# Patient Record
Sex: FEMALE | Race: ASIAN | NOT HISPANIC OR LATINO
[De-identification: names, ages, dates, MRNs, and addresses within clinical notes are randomized per-mention and may not be internally consistent; named-entity substitution may affect disease eponyms.]

---

## 2017-06-29 ENCOUNTER — FORM ENCOUNTER (OUTPATIENT)
Age: 57
End: 2017-06-29

## 2017-06-30 ENCOUNTER — APPOINTMENT (OUTPATIENT)
Age: 57
End: 2017-06-30

## 2017-09-29 ENCOUNTER — APPOINTMENT (OUTPATIENT)
Age: 57
End: 2017-09-29

## 2017-10-19 ENCOUNTER — FORM ENCOUNTER (OUTPATIENT)
Age: 57
End: 2017-10-19

## 2017-10-20 ENCOUNTER — APPOINTMENT (OUTPATIENT)
Age: 57
End: 2017-10-20
Payer: MEDICARE

## 2017-10-20 PROCEDURE — 36908Z: CUSTOM

## 2017-10-20 PROCEDURE — 36902Z: CUSTOM | Mod: 59

## 2017-12-16 ENCOUNTER — EMERGENCY (EMERGENCY)
Facility: HOSPITAL | Age: 57
LOS: 1 days | Discharge: HOME | End: 2017-12-16
Attending: EMERGENCY MEDICINE
Payer: MEDICARE

## 2017-12-16 VITALS
TEMPERATURE: 98 F | DIASTOLIC BLOOD PRESSURE: 71 MMHG | OXYGEN SATURATION: 97 % | HEART RATE: 76 BPM | HEIGHT: 63 IN | RESPIRATION RATE: 18 BRPM | SYSTOLIC BLOOD PRESSURE: 127 MMHG | WEIGHT: 94.36 LBS

## 2017-12-16 VITALS
SYSTOLIC BLOOD PRESSURE: 154 MMHG | OXYGEN SATURATION: 100 % | TEMPERATURE: 98 F | RESPIRATION RATE: 18 BRPM | DIASTOLIC BLOOD PRESSURE: 70 MMHG | HEART RATE: 71 BPM

## 2017-12-16 LAB
ALBUMIN SERPL ELPH-MCNC: 3.4 G/DL — LOW (ref 3.5–5)
ALBUMIN SERPL ELPH-MCNC: 3.5 G/DL — SIGNIFICANT CHANGE UP (ref 3.5–5)
ALP SERPL-CCNC: 185 U/L — HIGH (ref 40–120)
ALP SERPL-CCNC: 201 U/L — HIGH (ref 40–120)
ALT FLD-CCNC: 12 U/L DA — SIGNIFICANT CHANGE UP (ref 10–60)
ALT FLD-CCNC: 16 U/L DA — SIGNIFICANT CHANGE UP (ref 10–60)
ANION GAP SERPL CALC-SCNC: 12 MMOL/L — SIGNIFICANT CHANGE UP (ref 5–17)
ANION GAP SERPL CALC-SCNC: 2 MMOL/L — LOW (ref 5–17)
AST SERPL-CCNC: 12 U/L — SIGNIFICANT CHANGE UP (ref 10–40)
AST SERPL-CCNC: 62 U/L — HIGH (ref 10–40)
BASOPHILS # BLD AUTO: 0.1 K/UL — SIGNIFICANT CHANGE UP (ref 0–0.2)
BASOPHILS NFR BLD AUTO: 1.8 % — SIGNIFICANT CHANGE UP (ref 0–2)
BILIRUB SERPL-MCNC: 0.3 MG/DL — SIGNIFICANT CHANGE UP (ref 0.2–1.2)
BILIRUB SERPL-MCNC: 0.5 MG/DL — SIGNIFICANT CHANGE UP (ref 0.2–1.2)
BUN SERPL-MCNC: 24 MG/DL — HIGH (ref 7–18)
BUN SERPL-MCNC: 28 MG/DL — HIGH (ref 7–18)
CALCIUM SERPL-MCNC: 7.6 MG/DL — LOW (ref 8.4–10.5)
CALCIUM SERPL-MCNC: 7.7 MG/DL — LOW (ref 8.4–10.5)
CHLORIDE SERPL-SCNC: 93 MMOL/L — LOW (ref 96–108)
CHLORIDE SERPL-SCNC: 93 MMOL/L — LOW (ref 96–108)
CO2 SERPL-SCNC: 30 MMOL/L — SIGNIFICANT CHANGE UP (ref 22–31)
CO2 SERPL-SCNC: 33 MMOL/L — HIGH (ref 22–31)
CREAT SERPL-MCNC: 4.64 MG/DL — HIGH (ref 0.5–1.3)
CREAT SERPL-MCNC: 5.71 MG/DL — HIGH (ref 0.5–1.3)
EOSINOPHIL # BLD AUTO: 0.1 K/UL — SIGNIFICANT CHANGE UP (ref 0–0.5)
EOSINOPHIL NFR BLD AUTO: 2.7 % — SIGNIFICANT CHANGE UP (ref 0–6)
GLUCOSE SERPL-MCNC: 172 MG/DL — HIGH (ref 70–99)
GLUCOSE SERPL-MCNC: 91 MG/DL — SIGNIFICANT CHANGE UP (ref 70–99)
HCT VFR BLD CALC: 41.7 % — SIGNIFICANT CHANGE UP (ref 34.5–45)
HGB BLD-MCNC: 13.3 G/DL — SIGNIFICANT CHANGE UP (ref 11.5–15.5)
LYMPHOCYTES # BLD AUTO: 1.3 K/UL — SIGNIFICANT CHANGE UP (ref 1–3.3)
LYMPHOCYTES # BLD AUTO: 38.4 % — SIGNIFICANT CHANGE UP (ref 13–44)
MCHC RBC-ENTMCNC: 31.9 GM/DL — LOW (ref 32–36)
MCHC RBC-ENTMCNC: 32 PG — SIGNIFICANT CHANGE UP (ref 27–34)
MCV RBC AUTO: 100.4 FL — HIGH (ref 80–100)
MONOCYTES # BLD AUTO: 0.4 K/UL — SIGNIFICANT CHANGE UP (ref 0–0.9)
MONOCYTES NFR BLD AUTO: 10.6 % — SIGNIFICANT CHANGE UP (ref 2–14)
NEUTROPHILS # BLD AUTO: 1.6 K/UL — LOW (ref 1.8–7.4)
NEUTROPHILS NFR BLD AUTO: 46.5 % — SIGNIFICANT CHANGE UP (ref 43–77)
PLATELET # BLD AUTO: 160 K/UL — SIGNIFICANT CHANGE UP (ref 150–400)
POTASSIUM SERPL-MCNC: 3.3 MMOL/L — LOW (ref 3.5–5.3)
POTASSIUM SERPL-MCNC: 6.2 MMOL/L — CRITICAL HIGH (ref 3.5–5.3)
POTASSIUM SERPL-SCNC: 3.3 MMOL/L — LOW (ref 3.5–5.3)
POTASSIUM SERPL-SCNC: 6.2 MMOL/L — CRITICAL HIGH (ref 3.5–5.3)
PROT SERPL-MCNC: 7.1 G/DL — SIGNIFICANT CHANGE UP (ref 6–8.3)
PROT SERPL-MCNC: 7.9 G/DL — SIGNIFICANT CHANGE UP (ref 6–8.3)
RBC # BLD: 4.16 M/UL — SIGNIFICANT CHANGE UP (ref 3.8–5.2)
RBC # FLD: 15.1 % — HIGH (ref 10.3–14.5)
SODIUM SERPL-SCNC: 128 MMOL/L — LOW (ref 135–145)
SODIUM SERPL-SCNC: 135 MMOL/L — SIGNIFICANT CHANGE UP (ref 135–145)
WBC # BLD: 3.4 K/UL — LOW (ref 3.8–10.5)
WBC # FLD AUTO: 3.4 K/UL — LOW (ref 3.8–10.5)

## 2017-12-16 PROCEDURE — 74176 CT ABD & PELVIS W/O CONTRAST: CPT

## 2017-12-16 PROCEDURE — 85027 COMPLETE CBC AUTOMATED: CPT

## 2017-12-16 PROCEDURE — 71010: CPT | Mod: 26

## 2017-12-16 PROCEDURE — 99284 EMERGENCY DEPT VISIT MOD MDM: CPT | Mod: 25

## 2017-12-16 PROCEDURE — 99284 EMERGENCY DEPT VISIT MOD MDM: CPT

## 2017-12-16 PROCEDURE — 80053 COMPREHEN METABOLIC PANEL: CPT

## 2017-12-16 PROCEDURE — 74176 CT ABD & PELVIS W/O CONTRAST: CPT | Mod: 26

## 2017-12-16 PROCEDURE — 93005 ELECTROCARDIOGRAM TRACING: CPT

## 2017-12-16 PROCEDURE — 71045 X-RAY EXAM CHEST 1 VIEW: CPT

## 2017-12-16 NOTE — ED PROVIDER NOTE - OBJECTIVE STATEMENT
58 y/o F pt with PMHx of CKD (on dialysis; last session today) presents to ED c/o palpitations and chest pain x today; pt also reports cough, diarrhea, and abd pain x ~ 1 week. Pt reports onset of chest pain and palpitations after dialysis session. Pt was hospitalized for chest pain at [facility]; pt denies h/o stress test. Pt denies fever, chills, nausea, vomiting, or any other complaints. ALLERGIES: scopolamine, adhesives, and MAXALONE.

## 2017-12-16 NOTE — ED ADULT NURSE REASSESSMENT NOTE - NS ED NURSE REASSESS COMMENT FT1
received  pt awake alert oriented x 3 not in distress with saline lock intact,with daughter at bedside,waiting for dispo.

## 2017-12-16 NOTE — ED PROVIDER NOTE - CARE PLAN
Principal Discharge DX:	Palpitations  Secondary Diagnosis:	Constipation, unspecified constipation type

## 2017-12-16 NOTE — ED ADULT NURSE NOTE - OBJECTIVE STATEMENT
Patient presents to ED c/o palpitations and chest pain for today; pt also reports cough, diarrhea, and abd pain x ~ 1 week. Pt reports onset of chest pain and palpitations after dialysis session. Pt was hospitalized for chest pain at [facility]; pt denies h/o stress test. Pt denies fever, chills, nausea, vomiting, or any other complaints.

## 2017-12-16 NOTE — ED ADULT NURSE NOTE - CHPI ED SYMPTOMS NEG
no back pain/no dizziness/no chills/no syncope/no chest pain/no shortness of breath/no fever/no diaphoresis

## 2018-01-19 ENCOUNTER — APPOINTMENT (OUTPATIENT)
Age: 58
End: 2018-01-19

## 2018-09-13 ENCOUNTER — APPOINTMENT (OUTPATIENT)
Dept: ENDOVASCULAR SURGERY | Facility: CLINIC | Age: 58
End: 2018-09-13
Payer: MEDICARE

## 2018-09-13 PROCEDURE — 36901 INTRO CATH DIALYSIS CIRCUIT: CPT

## 2018-09-13 PROCEDURE — 36907Z: CUSTOM

## 2018-12-13 ENCOUNTER — APPOINTMENT (OUTPATIENT)
Dept: ENDOVASCULAR SURGERY | Facility: CLINIC | Age: 58
End: 2018-12-13
Payer: MEDICARE

## 2018-12-13 PROCEDURE — 36902Z: CUSTOM

## 2018-12-13 PROCEDURE — 36907Z: CUSTOM

## 2019-03-21 ENCOUNTER — APPOINTMENT (OUTPATIENT)
Dept: VASCULAR SURGERY | Facility: CLINIC | Age: 59
End: 2019-03-21
Payer: MEDICARE

## 2019-03-21 PROCEDURE — XXXXX: CPT

## 2019-03-21 PROCEDURE — 93990 DOPPLER FLOW TESTING: CPT

## 2019-08-21 PROBLEM — F32.9 DEPRESSION: Status: ACTIVE | Noted: 2019-08-21

## 2019-08-21 PROBLEM — Z85.79 HISTORY OF LYMPHOMA: Status: RESOLVED | Noted: 2019-08-21 | Resolved: 2019-08-21

## 2019-08-21 PROBLEM — T82.898A INADEQUATE FLOW OF DIALYSIS ARTERIOVENOUS FISTULA: Status: ACTIVE | Noted: 2019-08-21

## 2019-08-21 PROBLEM — N18.6 CHRONIC KIDNEY DISEASE REQUIRING CHRONIC DIALYSIS: Status: ACTIVE | Noted: 2017-10-23

## 2019-08-21 PROBLEM — K21.9 GERD (GASTROESOPHAGEAL REFLUX DISEASE): Status: ACTIVE | Noted: 2019-08-21

## 2019-08-21 PROBLEM — Q61.3 PKD (POLYCYSTIC KIDNEY DISEASE): Status: ACTIVE | Noted: 2019-08-21

## 2019-08-22 ENCOUNTER — APPOINTMENT (OUTPATIENT)
Dept: ENDOVASCULAR SURGERY | Facility: CLINIC | Age: 59
End: 2019-08-22
Payer: MEDICARE

## 2019-08-22 VITALS
BODY MASS INDEX: 18.73 KG/M2 | TEMPERATURE: 98 F | SYSTOLIC BLOOD PRESSURE: 210 MMHG | RESPIRATION RATE: 16 BRPM | OXYGEN SATURATION: 100 % | HEIGHT: 61 IN | WEIGHT: 99.21 LBS | DIASTOLIC BLOOD PRESSURE: 68 MMHG | HEART RATE: 56 BPM

## 2019-08-22 DIAGNOSIS — Z99.2 END STAGE RENAL DISEASE: ICD-10-CM

## 2019-08-22 DIAGNOSIS — K21.9 GASTRO-ESOPHAGEAL REFLUX DISEASE W/OUT ESOPHAGITIS: ICD-10-CM

## 2019-08-22 DIAGNOSIS — T82.898A OTHER SPECIFIED COMPLICATION OF VASCULAR PROSTHETIC DEVICES, IMPLANTS AND GRAFTS, INITIAL ENCOUNTER: ICD-10-CM

## 2019-08-22 DIAGNOSIS — Z85.79 PERSONAL HISTORY OF OTHER MALIGNANT NEOPLASMS OF LYMPHOID, HEMATOPOIETIC AND RELATED TISSUES: ICD-10-CM

## 2019-08-22 DIAGNOSIS — F32.9 MAJOR DEPRESSIVE DISORDER, SINGLE EPISODE, UNSPECIFIED: ICD-10-CM

## 2019-08-22 DIAGNOSIS — N18.6 END STAGE RENAL DISEASE: ICD-10-CM

## 2019-08-22 DIAGNOSIS — Q61.3 POLYCYSTIC KIDNEY, UNSPECIFIED: ICD-10-CM

## 2019-08-22 PROCEDURE — 36901 INTRO CATH DIALYSIS CIRCUIT: CPT

## 2019-08-22 PROCEDURE — 36907Z: CUSTOM

## 2019-08-22 NOTE — PAST MEDICAL HISTORY
[Increasing age ( >40 years old)] : Increasing age ( >40 years old) [No therapy indicated for cases scheduled for less than one hour] : No therapy indicated for cases scheduled for less than one hour. [FreeTextEntry1] : Malignant Hyperthermia Screening Tool and Risk of Bleeding Assessment\par \par Ms. JL CARDOSO denies family history of unexpected death following Anesthesia or Exercise.\par Denies Family history of Malignant Hyperthermia, Muscle or Neuromuscular disorder and High Temperature following exercise.\par \par Ms. JL CARDOSO denies history of Muscle Spasm, Dark or Chocolate - Colored urine and Unanticipated fever immediately following anesthesia or serious exercise. \par Ms. CARDOSO also denies bleeding tendencies/ Risks of Bleeding.\par

## 2019-08-22 NOTE — ASSESSMENT
[FreeTextEntry1] : PT with prolonged bleeding of RUE access for eval and possible PTA.  Consent obtained.\par \par Fistulogram demonstrates mod to severe stenosis in right SCLV stent pta to 14 mm with good result.  EBL=minimal.  FUll report to follow.

## 2019-08-22 NOTE — HISTORY OF PRESENT ILLNESS
[] : right brachiocephalic fistula [FreeTextEntry1] : 2013 [facility] [FreeTextEntry5] : 8/21/2019 8pm [FreeTextEntry6] : Dr. Law

## 2019-08-22 NOTE — PROCEDURE
[Resume diet] : resume diet [Site check for bleeding/hematoma/thrill/bruit] : Site check for bleeding/hematoma/thrill/bruit [Vital signs on admission the q 15 mins x2] : Vital signs on admission the q 15 mins x2 [FreeTextEntry1] : right arm fistula angioplasty

## 2019-11-19 ENCOUNTER — APPOINTMENT (OUTPATIENT)
Dept: VASCULAR SURGERY | Facility: CLINIC | Age: 59
End: 2019-11-19

## 2019-11-19 ENCOUNTER — OTHER (OUTPATIENT)
Age: 59
End: 2019-11-19

## 2020-10-30 ENCOUNTER — EMERGENCY (EMERGENCY)
Facility: HOSPITAL | Age: 60
LOS: 1 days | Discharge: HOME | End: 2020-10-30
Attending: STUDENT IN AN ORGANIZED HEALTH CARE EDUCATION/TRAINING PROGRAM | Admitting: EMERGENCY MEDICINE
Payer: MEDICARE

## 2020-10-30 VITALS
OXYGEN SATURATION: 100 % | HEART RATE: 62 BPM | SYSTOLIC BLOOD PRESSURE: 146 MMHG | RESPIRATION RATE: 18 BRPM | DIASTOLIC BLOOD PRESSURE: 56 MMHG | TEMPERATURE: 98 F | HEIGHT: 63 IN

## 2020-10-30 LAB
ALBUMIN SERPL ELPH-MCNC: 4.2 G/DL — SIGNIFICANT CHANGE UP (ref 3.3–5)
ALP SERPL-CCNC: 111 U/L — SIGNIFICANT CHANGE UP (ref 40–120)
ALT FLD-CCNC: < 4 U/L — LOW (ref 4–33)
ANION GAP SERPL CALC-SCNC: 14 MMO/L — SIGNIFICANT CHANGE UP (ref 7–14)
APAP SERPL-MCNC: < 15 UG/ML — LOW (ref 15–25)
AST SERPL-CCNC: 19 U/L — SIGNIFICANT CHANGE UP (ref 4–32)
BASE EXCESS BLDV CALC-SCNC: 1.7 MMOL/L — SIGNIFICANT CHANGE UP
BASOPHILS # BLD AUTO: 0.01 K/UL — SIGNIFICANT CHANGE UP (ref 0–0.2)
BASOPHILS NFR BLD AUTO: 0.2 % — SIGNIFICANT CHANGE UP (ref 0–2)
BILIRUB SERPL-MCNC: 0.3 MG/DL — SIGNIFICANT CHANGE UP (ref 0.2–1.2)
BLOOD GAS VENOUS - CREATININE: 6.94 MG/DL — HIGH (ref 0.5–1.3)
BLOOD GAS VENOUS - FIO2: 21 — SIGNIFICANT CHANGE UP
BUN SERPL-MCNC: 60 MG/DL — HIGH (ref 7–23)
CALCIUM SERPL-MCNC: 7.5 MG/DL — LOW (ref 8.4–10.5)
CHLORIDE BLDV-SCNC: 99 MMOL/L — SIGNIFICANT CHANGE UP (ref 96–108)
CHLORIDE SERPL-SCNC: 98 MMOL/L — SIGNIFICANT CHANGE UP (ref 98–107)
CO2 SERPL-SCNC: 26 MMOL/L — SIGNIFICANT CHANGE UP (ref 22–31)
CREAT SERPL-MCNC: 6.58 MG/DL — HIGH (ref 0.5–1.3)
EOSINOPHIL # BLD AUTO: 0.14 K/UL — SIGNIFICANT CHANGE UP (ref 0–0.5)
EOSINOPHIL NFR BLD AUTO: 2.9 % — SIGNIFICANT CHANGE UP (ref 0–6)
ETHANOL BLD-MCNC: < 10 MG/DL — SIGNIFICANT CHANGE UP
GAS PNL BLDV: 137 MMOL/L — SIGNIFICANT CHANGE UP (ref 136–146)
GLUCOSE BLDV-MCNC: 97 MG/DL — SIGNIFICANT CHANGE UP (ref 70–99)
GLUCOSE SERPL-MCNC: 99 MG/DL — SIGNIFICANT CHANGE UP (ref 70–99)
HCO3 BLDV-SCNC: 24 MMOL/L — SIGNIFICANT CHANGE UP (ref 20–27)
HCT VFR BLD CALC: 33.7 % — LOW (ref 34.5–45)
HCT VFR BLDV CALC: 33.6 % — LOW (ref 34.5–45)
HGB BLD-MCNC: 10.2 G/DL — LOW (ref 11.5–15.5)
HGB BLDV-MCNC: 10.9 G/DL — LOW (ref 11.5–15.5)
IMM GRANULOCYTES NFR BLD AUTO: 0.2 % — SIGNIFICANT CHANGE UP (ref 0–1.5)
LACTATE BLDV-MCNC: 0.7 MMOL/L — SIGNIFICANT CHANGE UP (ref 0.5–2)
LYMPHOCYTES # BLD AUTO: 0.98 K/UL — LOW (ref 1–3.3)
LYMPHOCYTES # BLD AUTO: 20.1 % — SIGNIFICANT CHANGE UP (ref 13–44)
MCHC RBC-ENTMCNC: 28.2 PG — SIGNIFICANT CHANGE UP (ref 27–34)
MCHC RBC-ENTMCNC: 30.3 % — LOW (ref 32–36)
MCV RBC AUTO: 93.1 FL — SIGNIFICANT CHANGE UP (ref 80–100)
MONOCYTES # BLD AUTO: 0.33 K/UL — SIGNIFICANT CHANGE UP (ref 0–0.9)
MONOCYTES NFR BLD AUTO: 6.8 % — SIGNIFICANT CHANGE UP (ref 2–14)
NEUTROPHILS # BLD AUTO: 3.4 K/UL — SIGNIFICANT CHANGE UP (ref 1.8–7.4)
NEUTROPHILS NFR BLD AUTO: 69.8 % — SIGNIFICANT CHANGE UP (ref 43–77)
NRBC # FLD: 0 K/UL — SIGNIFICANT CHANGE UP (ref 0–0)
PCO2 BLDV: 53 MMHG — HIGH (ref 41–51)
PH BLDV: 7.33 PH — SIGNIFICANT CHANGE UP (ref 7.32–7.43)
PLATELET # BLD AUTO: 174 K/UL — SIGNIFICANT CHANGE UP (ref 150–400)
PMV BLD: 10.2 FL — SIGNIFICANT CHANGE UP (ref 7–13)
PO2 BLDV: 25 MMHG — LOW (ref 35–40)
POTASSIUM BLDV-SCNC: 5 MMOL/L — HIGH (ref 3.4–4.5)
POTASSIUM SERPL-MCNC: 5.1 MMOL/L — SIGNIFICANT CHANGE UP (ref 3.5–5.3)
POTASSIUM SERPL-SCNC: 5.1 MMOL/L — SIGNIFICANT CHANGE UP (ref 3.5–5.3)
PROT SERPL-MCNC: 6.6 G/DL — SIGNIFICANT CHANGE UP (ref 6–8.3)
RBC # BLD: 3.62 M/UL — LOW (ref 3.8–5.2)
RBC # FLD: 16.6 % — HIGH (ref 10.3–14.5)
SALICYLATES SERPL-MCNC: < 5 MG/DL — LOW (ref 15–30)
SAO2 % BLDV: 29.2 % — LOW (ref 60–85)
SODIUM SERPL-SCNC: 138 MMOL/L — SIGNIFICANT CHANGE UP (ref 135–145)
WBC # BLD: 4.87 K/UL — SIGNIFICANT CHANGE UP (ref 3.8–10.5)
WBC # FLD AUTO: 4.87 K/UL — SIGNIFICANT CHANGE UP (ref 3.8–10.5)

## 2020-10-30 PROCEDURE — 99285 EMERGENCY DEPT VISIT HI MDM: CPT | Mod: 25

## 2020-10-30 PROCEDURE — 93010 ELECTROCARDIOGRAM REPORT: CPT

## 2020-10-30 NOTE — ED PROVIDER NOTE - CLINICAL SUMMARY MEDICAL DECISION MAKING FREE TEXT BOX
59 Y/O F PMH ESRD on Plavix, CKD (chronic kidney disease), Dyslipidemia, ESRD (end stage renal disease)  dialysis M, W, F, HTN (hypertension) normally takes Valium during dialysis took Valium as well as Benadryl today. Pt has become more arrousable during her stay and is now A+O X 3. Spoke to dialysis center who will be able to schedule her for session. Labs do not show abnormal potassium or other abnormalities requiring emergent intervention.

## 2020-10-30 NOTE — ED ADULT NURSE NOTE - CHIEF COMPLAINT QUOTE
Pt arrives from diaylsis. As per EMT" Staff reports after receiving diazapam & benadryl prior to dialysis treatment 15 minutes into treatment noted she is very lethargic. Pt opens eyes briefly ." Pt is on plavix Keanu called to triage. For eval. Noted reddness and warm rt upper arm diaylsis graft site.

## 2020-10-30 NOTE — ED PROVIDER NOTE - NSFOLLOWUPINSTRUCTIONS_ED_ALL_ED_FT
Your dialysis needs to be completed, you can come today at 630PM or tomorrow at 630AM. Make sure you do not miss the session. Advance activity as tolerated.  Continue all previously prescribed medications as directed.  Follow up with your primary care physician in 48-72 hours- bring copies of your results.  Return to the ER for worsening or persistent symptoms, and/or ANY NEW OR CONCERNING SYMPTOMS. If you have issues obtaining follow up, please call: [phone] to obtain a doctor or specialist who takes your insurance in your area.  You may call [phone] to make an appointment with the internal medicine clinic.

## 2020-10-30 NOTE — ED PROVIDER NOTE - PMH
CKD (chronic kidney disease)    Dyslipidemia    ESRD (end stage renal disease)  dialysis M, W, F  HTN (hypertension)

## 2020-10-30 NOTE — ED PROVIDER NOTE - RAPID ASSESSMENT
Jay KUMAR MD PGY3: Called to triage for stroke eval. Patient ESRD on Eliquis p/w AMS at dialysis and apparently receives anxiolytics including diazepam prior to initiating dialysis. Today was noted to become somnolent 15 minutes into dialysis session and was BIBEMS. No trauma to head. No falls. Uncertain of patietn baseline. VSS. Patient somnolent but arousable to pain and loud voice. Moves all extremities in response to pain, EOM intact, pupils intact, CN7 and CN8 difficult to assess, however remainder cranial nerves aside from CN 1 intact.  Stroke code not called at this time due to neuro functions grossly intact barring somnolence, and reasonable alternatives (sedation from medication, possible infection) present.

## 2020-10-30 NOTE — ED PROVIDER NOTE - PSH
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

## 2020-10-30 NOTE — ED ADULT NURSE NOTE - NSIMPLEMENTINTERV_GEN_ALL_ED
Implemented All Fall Risk Interventions:  [place] to call system. Call bell, personal items and telephone within reach. Instruct patient to call for assistance. Room bathroom lighting operational. Non-slip footwear when patient is off stretcher. Physically safe environment: no spills, clutter or unnecessary equipment. Stretcher in lowest position, wheels locked, appropriate side rails in place. Provide visual cue, wrist band, yellow gown, etc. Monitor gait and stability. Monitor for mental status changes and reorient to person, place, and time. Review medications for side effects contributing to fall risk. Reinforce activity limits and safety measures with patient and family.

## 2020-10-30 NOTE — ED ADULT TRIAGE NOTE - CHIEF COMPLAINT QUOTE
Pt arrives from diaylsis. As per EMT" Staff reports after receiving diazapam & benadryl prior to dialysis treatment 15 minutes into treatment noted she is very lethargic. Pt opens eyes briefly ." Pt is on plavix Pt arrives from diaylsis. As per EMT" Staff reports after receiving diazapam & benadryl prior to dialysis treatment 15 minutes into treatment noted she is very lethargic. Pt opens eyes briefly ." Pt is on plavix Keanu called to triage. For eval. Noted reddness and warm rt upper arm diaylsis graft site.

## 2020-10-30 NOTE — ED PROVIDER NOTE - PATIENT PORTAL LINK FT
You can access the FollowMyHealth Patient Portal offered by [facility] by registering at the following website: [email or web address]. By joining [employer]’s FollowMyHealth portal, you will also be able to view your health information using other applications (apps) compatible with our system.

## 2020-10-30 NOTE — ED PROVIDER NOTE - PROGRESS NOTE DETAILS
ERINN Gaytan: Pt is now A+O, concerned about the presence of her phone. Spoke to Odessa, the charge nurse from [facility] [place]. States that pt was confused after tasking her medication at dialysis and was more lethargic so 911 was called. States that pt would be able to go for dialysis today at 630PM or tomorrow at 630AM. Advised pt's  of plan as well. Will D/C. ERINN Gaytan: Spoke to pt's  Luis, states that he will pick pt up from ED shortly, advised of plan of D/C and that pt must coordinate with the [facility] [place] to reschedule dialysis, verbalized understanding.

## 2020-10-30 NOTE — ED ADULT NURSE NOTE - OBJECTIVE STATEMENT
Received pt to spot 25 A&Ox4 on assessment is unable to report what happened at dialysis this morning. Pt denies complaints at this time, notably extremely anxious on assessment, repeating the same thing numerous times, unable to be redirected. IV placed, labs sent, VS as documented, will continue to monitor.

## 2020-10-30 NOTE — ED PROVIDER NOTE - ATTENDING CONTRIBUTION TO CARE
Lemuel RECIO: I agree with the above provided history and exam and addend/modify it as follows.    60F w/ pmh Plavix, CKD (chronic kidney disease), HLD, ESRD on dialysis MWF, HTN -- sent from dialysis - patient was lethargic, AMS, patient does usually take valium and bendaryl prior to her dialysis sessions and did take it today as well. Patient upon arrival to ED was sleepy, however after further discussion and stimulation patient is now awake, obeying commands, denies any pain, headache, cough, sob, etc. Neuro exam grossly nonfocal, normal. AMS likely 2/2 medication, possible inadvertant overdose? but patient back to baseline now in room, will check labs, discuss w/ her dialysis center to resume dialysis today, likely dc w/ close outpt f/u    I Isaak Hdez MD performed a history and physical exam of the patient and discussed their management with the resident and /or advanced care provider. I reviewed the resident and /or ACP's note and agree with the documented findings and plan of care. My medical decision making and observations are found above.

## 2020-10-30 NOTE — ED PROVIDER NOTE - OBJECTIVE STATEMENT
59 Y/O F PMH ESRD on Plavix 59 Y/O F PMH ESRD on Plavix, CKD (chronic kidney disease), Dyslipidemia, ESRD (end stage renal disease)  dialysis M, W, F, HTN (hypertension) normally takes Valium and Benadryl 61 Y/O F PMH ESRD on Plavix, CKD (chronic kidney disease), Dyslipidemia, ESRD (end stage renal disease)  dialysis M, W, F, HTN (hypertension) normally takes Valium during dialysis took Valium as well as Benadryl today. Pt also states she takes Oxycodone for pain. Spoke to Odessa from center, states pt became lethargic while at session so EMS was called. Pt arrived to ED somolent but continued to become more arousable during her stay. Pt denies falling and denies any acute symptoms or complaints.

## 2023-03-31 ENCOUNTER — INPATIENT (INPATIENT)
Facility: HOSPITAL | Age: 63
LOS: 0 days | Discharge: HOME | End: 2023-04-01
Attending: STUDENT IN AN ORGANIZED HEALTH CARE EDUCATION/TRAINING PROGRAM | Admitting: STUDENT IN AN ORGANIZED HEALTH CARE EDUCATION/TRAINING PROGRAM
Payer: MEDICARE

## 2023-03-31 VITALS
HEART RATE: 69 BPM | RESPIRATION RATE: 18 BRPM | DIASTOLIC BLOOD PRESSURE: 52 MMHG | OXYGEN SATURATION: 100 % | TEMPERATURE: 98 F | SYSTOLIC BLOOD PRESSURE: 161 MMHG

## 2023-03-31 DIAGNOSIS — E78.00 PURE HYPERCHOLESTEROLEMIA, UNSPECIFIED: ICD-10-CM

## 2023-03-31 DIAGNOSIS — N18.6 END STAGE RENAL DISEASE: ICD-10-CM

## 2023-03-31 DIAGNOSIS — D64.9 ANEMIA, UNSPECIFIED: ICD-10-CM

## 2023-03-31 DIAGNOSIS — Z79.899 OTHER LONG TERM (CURRENT) DRUG THERAPY: ICD-10-CM

## 2023-03-31 DIAGNOSIS — I63.9 CEREBRAL INFARCTION, UNSPECIFIED: ICD-10-CM

## 2023-03-31 DIAGNOSIS — I10 ESSENTIAL (PRIMARY) HYPERTENSION: ICD-10-CM

## 2023-03-31 LAB
ALBUMIN SERPL ELPH-MCNC: 4.1 G/DL — SIGNIFICANT CHANGE UP (ref 3.3–5)
ALP SERPL-CCNC: 126 U/L — HIGH (ref 40–120)
ALT FLD-CCNC: 16 U/L — SIGNIFICANT CHANGE UP (ref 4–33)
ANION GAP SERPL CALC-SCNC: 16 MMOL/L — HIGH (ref 7–14)
ANION GAP SERPL CALC-SCNC: 17 MMOL/L — HIGH (ref 7–14)
AST SERPL-CCNC: 38 U/L — HIGH (ref 4–32)
BASE EXCESS BLDV CALC-SCNC: 2.9 MMOL/L — SIGNIFICANT CHANGE UP (ref -2–3)
BASOPHILS # BLD AUTO: 0.02 K/UL — SIGNIFICANT CHANGE UP (ref 0–0.2)
BASOPHILS NFR BLD AUTO: 0.4 % — SIGNIFICANT CHANGE UP (ref 0–2)
BILIRUB SERPL-MCNC: 0.4 MG/DL — SIGNIFICANT CHANGE UP (ref 0.2–1.2)
BLD GP AB SCN SERPL QL: NEGATIVE — SIGNIFICANT CHANGE UP
BLOOD GAS VENOUS COMPREHENSIVE RESULT: SIGNIFICANT CHANGE UP
BUN SERPL-MCNC: 54 MG/DL — HIGH (ref 7–23)
BUN SERPL-MCNC: 56 MG/DL — HIGH (ref 7–23)
CALCIUM SERPL-MCNC: 7.8 MG/DL — LOW (ref 8.4–10.5)
CALCIUM SERPL-MCNC: 8 MG/DL — LOW (ref 8.4–10.5)
CHLORIDE BLDV-SCNC: 99 MMOL/L — SIGNIFICANT CHANGE UP (ref 96–108)
CHLORIDE SERPL-SCNC: 96 MMOL/L — LOW (ref 98–107)
CHLORIDE SERPL-SCNC: 97 MMOL/L — LOW (ref 98–107)
CO2 BLDV-SCNC: 29.3 MMOL/L — HIGH (ref 22–26)
CO2 SERPL-SCNC: 24 MMOL/L — SIGNIFICANT CHANGE UP (ref 22–31)
CO2 SERPL-SCNC: 25 MMOL/L — SIGNIFICANT CHANGE UP (ref 22–31)
CREAT SERPL-MCNC: 7.99 MG/DL — HIGH (ref 0.5–1.3)
CREAT SERPL-MCNC: 8.06 MG/DL — HIGH (ref 0.5–1.3)
DIALYSIS INSTRUMENT RESULT - HEPATITIS B SURFACE ANTIGEN: NEGATIVE — SIGNIFICANT CHANGE UP
EGFR: 5 ML/MIN/1.73M2 — LOW
EGFR: 5 ML/MIN/1.73M2 — LOW
EOSINOPHIL # BLD AUTO: 0.19 K/UL — SIGNIFICANT CHANGE UP (ref 0–0.5)
EOSINOPHIL NFR BLD AUTO: 4.3 % — SIGNIFICANT CHANGE UP (ref 0–6)
FERRITIN SERPL-MCNC: 1169 NG/ML — HIGH (ref 15–150)
FLUAV AG NPH QL: SIGNIFICANT CHANGE UP
FLUBV AG NPH QL: SIGNIFICANT CHANGE UP
GAS PNL BLDV: 137 MMOL/L — SIGNIFICANT CHANGE UP (ref 136–145)
GLUCOSE BLDV-MCNC: 97 MG/DL — SIGNIFICANT CHANGE UP (ref 70–99)
GLUCOSE SERPL-MCNC: 101 MG/DL — HIGH (ref 70–99)
GLUCOSE SERPL-MCNC: 159 MG/DL — HIGH (ref 70–99)
HCO3 BLDV-SCNC: 28 MMOL/L — SIGNIFICANT CHANGE UP (ref 22–29)
HCT VFR BLD CALC: 23.5 % — LOW (ref 34.5–45)
HCT VFR BLDA CALC: 23 % — LOW (ref 34.5–46.5)
HGB BLD CALC-MCNC: 7.8 G/DL — LOW (ref 11.7–16.1)
HGB BLD-MCNC: 7.2 G/DL — LOW (ref 11.5–15.5)
IANC: 2.83 K/UL — SIGNIFICANT CHANGE UP (ref 1.8–7.4)
IMM GRANULOCYTES NFR BLD AUTO: 0.2 % — SIGNIFICANT CHANGE UP (ref 0–0.9)
IRON SATN MFR SERPL: 21 % — SIGNIFICANT CHANGE UP (ref 14–50)
IRON SATN MFR SERPL: 46 UG/DL — SIGNIFICANT CHANGE UP (ref 30–160)
LACTATE BLDV-MCNC: 1.5 MMOL/L — SIGNIFICANT CHANGE UP (ref 0.5–2)
LYMPHOCYTES # BLD AUTO: 0.93 K/UL — LOW (ref 1–3.3)
LYMPHOCYTES # BLD AUTO: 20.8 % — SIGNIFICANT CHANGE UP (ref 13–44)
MCHC RBC-ENTMCNC: 30.6 GM/DL — LOW (ref 32–36)
MCHC RBC-ENTMCNC: 31 PG — SIGNIFICANT CHANGE UP (ref 27–34)
MCV RBC AUTO: 101.3 FL — HIGH (ref 80–100)
MONOCYTES # BLD AUTO: 0.49 K/UL — SIGNIFICANT CHANGE UP (ref 0–0.9)
MONOCYTES NFR BLD AUTO: 11 % — SIGNIFICANT CHANGE UP (ref 2–14)
NEUTROPHILS # BLD AUTO: 2.83 K/UL — SIGNIFICANT CHANGE UP (ref 1.8–7.4)
NEUTROPHILS NFR BLD AUTO: 63.3 % — SIGNIFICANT CHANGE UP (ref 43–77)
NRBC # BLD: 0 /100 WBCS — SIGNIFICANT CHANGE UP (ref 0–0)
NRBC # FLD: 0 K/UL — SIGNIFICANT CHANGE UP (ref 0–0)
PCO2 BLDV: 44 MMHG — SIGNIFICANT CHANGE UP (ref 39–52)
PH BLDV: 7.41 — SIGNIFICANT CHANGE UP (ref 7.32–7.43)
PLATELET # BLD AUTO: 243 K/UL — SIGNIFICANT CHANGE UP (ref 150–400)
PO2 BLDV: <20 MMHG — LOW (ref 25–45)
POTASSIUM BLDV-SCNC: 5.7 MMOL/L — HIGH (ref 3.5–5.1)
POTASSIUM SERPL-MCNC: 4.8 MMOL/L — SIGNIFICANT CHANGE UP (ref 3.5–5.3)
POTASSIUM SERPL-MCNC: 5.3 MMOL/L — SIGNIFICANT CHANGE UP (ref 3.5–5.3)
POTASSIUM SERPL-SCNC: 4.8 MMOL/L — SIGNIFICANT CHANGE UP (ref 3.5–5.3)
POTASSIUM SERPL-SCNC: 5.3 MMOL/L — SIGNIFICANT CHANGE UP (ref 3.5–5.3)
PROT SERPL-MCNC: 6.6 G/DL — SIGNIFICANT CHANGE UP (ref 6–8.3)
RBC # BLD: 2.32 M/UL — LOW (ref 3.8–5.2)
RBC # FLD: 15.9 % — HIGH (ref 10.3–14.5)
RH IG SCN BLD-IMP: POSITIVE — SIGNIFICANT CHANGE UP
RSV RNA NPH QL NAA+NON-PROBE: SIGNIFICANT CHANGE UP
SAO2 % BLDV: 20.4 % — LOW (ref 67–88)
SARS-COV-2 RNA SPEC QL NAA+PROBE: SIGNIFICANT CHANGE UP
SODIUM SERPL-SCNC: 137 MMOL/L — SIGNIFICANT CHANGE UP (ref 135–145)
SODIUM SERPL-SCNC: 138 MMOL/L — SIGNIFICANT CHANGE UP (ref 135–145)
TIBC SERPL-MCNC: 220 UG/DL — SIGNIFICANT CHANGE UP (ref 220–430)
UIBC SERPL-MCNC: 174 UG/DL — SIGNIFICANT CHANGE UP (ref 110–370)
WBC # BLD: 4.47 K/UL — SIGNIFICANT CHANGE UP (ref 3.8–10.5)
WBC # FLD AUTO: 4.47 K/UL — SIGNIFICANT CHANGE UP (ref 3.8–10.5)

## 2023-03-31 PROCEDURE — 99285 EMERGENCY DEPT VISIT HI MDM: CPT

## 2023-03-31 PROCEDURE — 99223 1ST HOSP IP/OBS HIGH 75: CPT

## 2023-03-31 RX ORDER — OXCARBAZEPINE 300 MG/1
150 TABLET, FILM COATED ORAL
Refills: 0 | Status: DISCONTINUED | OUTPATIENT
Start: 2023-03-31 | End: 2023-04-01

## 2023-03-31 RX ORDER — METOPROLOL TARTRATE 50 MG
50 TABLET ORAL DAILY
Refills: 0 | Status: DISCONTINUED | OUTPATIENT
Start: 2023-03-31 | End: 2023-04-01

## 2023-03-31 RX ORDER — ERYTHROPOIETIN 10000 [IU]/ML
10000 INJECTION, SOLUTION INTRAVENOUS; SUBCUTANEOUS
Refills: 0 | Status: DISCONTINUED | OUTPATIENT
Start: 2023-03-31 | End: 2023-04-01

## 2023-03-31 RX ORDER — AMLODIPINE BESYLATE 2.5 MG/1
10 TABLET ORAL DAILY
Refills: 0 | Status: DISCONTINUED | OUTPATIENT
Start: 2023-03-31 | End: 2023-04-01

## 2023-03-31 RX ORDER — POLYETHYLENE GLYCOL 3350 17 G/17G
17 POWDER, FOR SOLUTION ORAL DAILY
Refills: 0 | Status: DISCONTINUED | OUTPATIENT
Start: 2023-03-31 | End: 2023-04-01

## 2023-03-31 RX ORDER — LANOLIN ALCOHOL/MO/W.PET/CERES
3 CREAM (GRAM) TOPICAL AT BEDTIME
Refills: 0 | Status: DISCONTINUED | OUTPATIENT
Start: 2023-03-31 | End: 2023-04-01

## 2023-03-31 RX ORDER — ATORVASTATIN CALCIUM 80 MG/1
40 TABLET, FILM COATED ORAL AT BEDTIME
Refills: 0 | Status: DISCONTINUED | OUTPATIENT
Start: 2023-03-31 | End: 2023-04-01

## 2023-03-31 RX ORDER — SENNA PLUS 8.6 MG/1
2 TABLET ORAL AT BEDTIME
Refills: 0 | Status: DISCONTINUED | OUTPATIENT
Start: 2023-03-31 | End: 2023-04-01

## 2023-03-31 RX ORDER — ASPIRIN/CALCIUM CARB/MAGNESIUM 324 MG
81 TABLET ORAL DAILY
Refills: 0 | Status: DISCONTINUED | OUTPATIENT
Start: 2023-03-31 | End: 2023-04-01

## 2023-03-31 RX ORDER — ACETAMINOPHEN 500 MG
650 TABLET ORAL EVERY 6 HOURS
Refills: 0 | Status: DISCONTINUED | OUTPATIENT
Start: 2023-03-31 | End: 2023-04-01

## 2023-03-31 RX ORDER — CHLORHEXIDINE GLUCONATE 213 G/1000ML
1 SOLUTION TOPICAL DAILY
Refills: 0 | Status: DISCONTINUED | OUTPATIENT
Start: 2023-03-31 | End: 2023-04-01

## 2023-03-31 RX ORDER — ONDANSETRON 8 MG/1
4 TABLET, FILM COATED ORAL EVERY 8 HOURS
Refills: 0 | Status: DISCONTINUED | OUTPATIENT
Start: 2023-03-31 | End: 2023-04-01

## 2023-03-31 RX ADMIN — Medication 650 MILLIGRAM(S): at 19:10

## 2023-03-31 RX ADMIN — Medication 650 MILLIGRAM(S): at 19:58

## 2023-03-31 NOTE — CHART NOTE - NSCHARTNOTEFT_GEN_A_CORE
Notified by RN that patient c/o abdominal pain., Patient seen and assessed at dialysis unit. Patient is alert and  oriented x3. Patient reports left sided lower abdominal pain. Patient reports that she is constipated and had a small bowel movement prior to dialysis session. Patient denies nausea, vomiting and epigastric pain. on exam, abdomen soft , positive bowel sounds. Mild tenderness noted at the left lower quadrant. Abdominal pain likely 2/2 constipation. Laxatives ordered. Will continue to monitor.

## 2023-03-31 NOTE — H&P ADULT - PROBLEM SELECTOR PLAN 6
- Med rec verified with [facility] home pharmacy, as patient did not know all of her home meds   - Unclear indication but will continue Trileptal 150mg at bedtime

## 2023-03-31 NOTE — ED ADULT TRIAGE NOTE - CHIEF COMPLAINT QUOTE
sent in from dialysis center at [facility] for hgb of 6. pt endorses no complaints, Hx CKD., CVA with right sided weakens, HTN.  right forearm AVF noted.

## 2023-03-31 NOTE — H&P ADULT - PROBLEM SELECTOR PLAN 2
Was found to have hgb of 6.6 outpatient. On admission 7.2   - Will add on Anemia labs - TIBC, ferritin, iron  - Getting pRBC in the ED, Was found to have hgb of 6.6 outpatient. On admission 7.2   - Will add on Anemia labs - TIBC, ferritin, iron  - Getting pRBC in the ED, f/u repeat   - EPO per renal

## 2023-03-31 NOTE — ED PROVIDER NOTE - OBJECTIVE STATEMENT
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 Pt denies cp, sob, palpitations, feeling light headed/dizzy. Denies black/bloody stools. States she has had blood transfusions before for anemia 2/2 renal disease. 61 yo F hx polycystic kidney disease, ESRD (on HD MWF), renal transplant in 1990, lymphoma in remission, CVA 3 weeks ago, sent in by dialysis center for Hb 6.6. Pt denies cp, sob, palpitations, feeling light headed/dizzy. Denies black/bloody stools. States she has had blood transfusions before for anemia 2/2 renal disease.

## 2023-03-31 NOTE — CONSULT NOTE ADULT - SUBJECTIVE AND OBJECTIVE BOX
NEPHROLOGY    HPI: 63 yo F hx polycystic kidney disease, ESRD (on HD MWF), renal transplant in , lymphoma in remission, CVA 3 weeks ago, sent in by dialysis center ([facility]) for Hb 6.6.     Pt seen and examined, she denies cp or sob, no lightheadedness or dizziness, denies palpitations, states that she was last dialyzed on Wednesday.     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     FAMILY HISTORY:  No pertinent family history in first degree relatives      VITALS:  T(C): 36.8 (23 @ 09:52), Max: 36.8 (23 @ 08:26)  HR: 68 (23 @ 09:52) (68 - 69)  BP: 160/63 (23 @ 09:52) (160/63 - 161/52)  RR: 16 (23 @ 09:52) (16 - 18)  SpO2: 100% (23 @ 09:52) (100% - 100%)    REVIEW OF SYSTEMS:  Denies any nausea, vomiting, diarrhea, fever or chills. Denies chest pain, SOB, focal weakness. Good oral intake and denies fatigue. All other pertinent systems are reviewed and are negative.    PHYSICAL EXAM:  Constitutional: NAD  HEENT: EOMI  Neck:  No JVD, supple   Respiratory: CTA B/L  Cardiovascular: S1 and S2, RRR  Gastrointestinal: + BS, soft, NT, ND  Extremities: No peripheral edema, + peripheral pulses  Neurological: A/O x 3, CN2-12 intact  Psychiatric: Normal mood, normal affect  : No Ochoa  Skin: No rashes, C/D/I  Access: RUE AVF (+thrill/roxie)     LABS:                        7.2    4.47  )-----------( 243      ( 31 Mar 2023 09:45 )             23.5         137  |  96<L>  |  54<H>  ----------------------------<  159<H>  4.8   |  25  |  7.99<H>    Ca    7.8<L>      31 Mar 2023 11:07    TPro  6.6  /  Alb  4.1  /  TBili  0.4  /  DBili  x   /  AST  38<H>  /  ALT  16  /  AlkPhos  126<H>     NEPHROLOGY    HPI: 61 yo F hx polycystic kidney disease, ESRD (on HD MWF), renal transplant in , lymphoma in remission, CVA 3 weeks ago, sent in by dialysis center for Hb 6.6.     Pt seen and examined, she denies cp or sob, no lightheadedness or dizziness, denies palpitations, denies bloody stools, states that she was last dialyzed on Wednesday.     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     FAMILY HISTORY:  No pertinent family history in first degree relatives      VITALS:  T(C): 36.8 (23 @ 09:52), Max: 36.8 (23 @ 08:26)  HR: 68 (23 @ 09:52) (68 - 69)  BP: 160/63 (23 @ 09:52) (160/63 - 161/52)  RR: 16 (23 @ 09:52) (16 - 18)  SpO2: 100% (23 @ 09:52) (100% - 100%)    REVIEW OF SYSTEMS:  Denies any nausea, vomiting, diarrhea, fever or chills. Denies chest pain, SOB, focal weakness. Good oral intake and denies fatigue. All other pertinent systems are reviewed and are negative.    PHYSICAL EXAM:  Constitutional: NAD  HEENT: EOMI  Neck:  No JVD, supple   Respiratory: CTA B/L  Cardiovascular: S1 and S2, RRR  Gastrointestinal: + BS, soft, NT, ND  Extremities: No peripheral edema, + peripheral pulses  Neurological: A/O x 3, CN2-12 intact  Psychiatric: Normal mood, normal affect  : No Ochoa  Skin: No rashes, C/D/I  Access: RUE AVF (+alecia/roxie)     LABS:                        7.2    4.47  )-----------( 243      ( 31 Mar 2023 09:45 )             23.5         137  |  96<L>  |  54<H>  ----------------------------<  159<H>  4.8   |  25  |  7.99<H>    Ca    7.8<L>      31 Mar 2023 11:07    TPro  6.6  /  Alb  4.1  /  TBili  0.4  /  DBili  x   /  AST  38<H>  /  ALT  16  /  AlkPhos  126<H>

## 2023-03-31 NOTE — H&P ADULT - NSHPPHYSICALEXAM_GEN_ALL_CORE
GENERAL: NAD  HEENT: EOMI, MMM, no oropharyngeal lesions or erythema appreciated  Pulm: normal work of breathing, CTABL  CV: RRR, S1&S2+, no m/r/g appreciated  ABDOMEN: soft, nt, nd, no hepatosplenomegaly  MSK: nl ROM  EXTREMITIES:  no appreciable edema in b/l LE  Neuro: A&Ox3, no focal deficits  SKIN: warm and dry, no visible rash GENERAL: Elderly woman in  NAD  HEENT: EOMI, MMM, no oropharyngeal lesions or erythema appreciated  Pulm: normal work of breathing, CTABL  CV: RRR, S1&S2+, no m/r/g appreciated  ABDOMEN: soft, nt, nd, no hepatosplenomegaly  MSK: nl ROM  EXTREMITIES:  no appreciable edema in b/l LE  Neuro: A&Ox3, no focal deficits  SKIN: warm and dry, no visible rash GENERAL: Elderly woman in  NAD  HEENT: EOMI, MMM, no oropharyngeal lesions or erythema appreciated  Pulm: normal work of breathing, CTABL  CV: RRR, S1&S2+, no m/r/g appreciated  ABDOMEN: soft, nt, nd, no hepatosplenomegaly  EXTREMITIES:  no appreciable edema in b/l LE  Neuro: A&Ox3, no focal deficits  SKIN: warm and dry, no visible rash

## 2023-03-31 NOTE — CONSULT NOTE ADULT - ASSESSMENT
ASSESSMENT:61 yo F hx polycystic kidney disease, ESRD (on HD MWF), renal transplant in 1990, lymphoma in remission, CVA 3 weeks ago, sent in by dialysis center ([facility]) for Hb 6.6.     (1)     RECOMMEND       ASSESSMENT:61 yo F hx polycystic kidney disease, ESRD (on HD MWF), renal transplant in 1990, lymphoma in remission, CVA 3 weeks ago, sent in by dialysis center for Hb 6.6.     (1)ESRD - on HD MWF - last dialyzed Wednesday - due today  (2)Anemia - planned for transfusion    RECOMMEND  (1) HD today   (2) PRBC per primary team    Esme Cleary DNP  [facility]  [phone]      ASSESSMENT:61 yo F hx polycystic kidney disease, ESRD (on HD MWF), renal transplant in 1990, lymphoma in remission, CVA 3 weeks ago, sent in by dialysis center for Hb 6.6.     (1)ESRD - on HD MWF - last dialyzed Wednesday - due today  (2)Anemia - planned for transfusion    RECOMMEND  (1) HD today   (2) PRBC per primary team    Esme Cleary DNP  [facility]  [phone]     RENAL ATTENDING NOTE  Patient seen and examined with NP. Agree with assessment and plan as above.  Will attempt 2.5L UF per patient's request  No objection to discharge after HD if no other further complications ensue; next HD would be Monday 4/3/23 at [facility].    Christoph Spear MD  [facility]  [phone]

## 2023-03-31 NOTE — H&P ADULT - NSHPLABSRESULTS_GEN_ALL_CORE
LABS:  CAPILLARY BLOOD GLUCOSE                                7.2    4.47  )-----------( 243      ( 31 Mar 2023 09:45 )             23.5     03-31    137  |  96<L>  |  54<H>  ----------------------------<  159<H>  4.8   |  25  |  7.99<H>    Ca    7.8<L>      31 Mar 2023 11:07    TPro  6.6  /  Alb  4.1  /  TBili  0.4  /  DBili  x   /  AST  38<H>  /  ALT  16  /  AlkPhos  126<H>  03-31                RADIOLOGY & ADDITIONAL TESTS:    Telemetry Personally Reviewed:    ECG Personally Reviewed:    Imaging Personally Reviewed:    Imaging Reviewed:     Consultant(s) Notes Reviewed:      Care Discussed with Consultants/Other Providers:

## 2023-03-31 NOTE — H&P ADULT - ASSESSMENT
63 yo F hx polycystic kidney disease, ESRD (on HD MWF), renal transplant in 1990, lymphoma in remission, CVA 3 weeks ago, sent in by dialysis center for Hb 6.6.

## 2023-03-31 NOTE — H&P ADULT - NSICDXPASTMEDICALHX_GEN_ALL_CORE_FT
PAST MEDICAL HISTORY:  CKD (chronic kidney disease)     Dyslipidemia     ESRD (end stage renal disease) dialysis M, W, F    HTN (hypertension)

## 2023-03-31 NOTE — ED PROVIDER NOTE - PROGRESS NOTE DETAILS
Carey: next dialysis appointment not until tomorrow at 6 pm, pt prefers to be admitted for dialysis today. Pending call back from renal group. admit

## 2023-03-31 NOTE — ED PROVIDER NOTE - PHYSICAL EXAMINATION
VITALS: reviewed  GEN: NAD, A & O x 4  HEAD/EYES: NCAT, EOMI, anicteric sclerae,   ENT: mucus membranes moist, oropharynx WNL, trachea midline,  RESP: lungs CTA with equal breath sounds bilaterally, chest wall nontender and atraumatic  CV: heart with reg rhythm S1, S2, distal pulses intact and symmetric bilaterally, AVF RUE  ABDOMEN: soft, nondistended, nontender, no palpable masses  : no CVAT  MSK: extremities atraumatic and nontender, no edema, no asymmetry.  SKIN: warm, dry, no rash, no bruising, no cyanosis. color appropriate for ethnicity  NEURO: alert, mentating appropriately, no facial asymmetry.   PSYCH: Affect appropriate

## 2023-03-31 NOTE — ED ADULT NURSE NOTE - CHIEF COMPLAINT QUOTE
sent in from dialysis center at [facility] for hgb of 6. pt endorses no complaints, Hx CKD., CVA with right sided weakens, HTN.  right forearm AVF noted.

## 2023-03-31 NOTE — H&P ADULT - PROBLEM SELECTOR PLAN 4
- Patient on 4 BP agents, Will slowly re-start home meds  - Hold home losartan and hydralazine, re-start as neeeded   - Continue amlodipine and metoprolol 50mg ER, with hold parameters

## 2023-03-31 NOTE — ED ADULT NURSE NOTE - OBJECTIVE STATEMENT
received pt in room 24, 62 yr/o female A+OX4, ambulatory at baseline. PMH of ESRD on dialysis M/W/F last dialysis was 3/29, fistula noted ot right upper arm; bruity and thrill strong. pt presented to the ED form [facility] due to low Hgb. pt is currently asymptomatic. denies active bleeding, and dark stools. VSS, denies chest pain and SOB, RR even and unlabored. labs drawn and sent. left upper arm US IV placed. pt is stable at this time. will continue to monitor.

## 2023-03-31 NOTE — ED ADULT NURSE REASSESSMENT NOTE - NS ED NURSE REASSESS COMMENT FT1
Break RN: Received report from MONE Lynn. Pt resting in bed in non acute distress. Respirations even and unlabored sating 100% on room air. PRBC started @ 1410. PRBC verified with MONE Navarro. Pt educated on risk/benefit of prbc transfusion. Consent signed and in chart. Pt with hx of prbc transfusions and denies hx of transfusion reaction. VSS. Pt denies headache, dizziness, chest pain, shortness of breath, back pain, hives, n/v/d, fever. Will continue to monitor.

## 2023-03-31 NOTE — H&P ADULT - NSICDXPASTSURGICALHX_GEN_ALL_CORE_FT
PAST SURGICAL HISTORY:  AV fistula R arm - for dialysis    Failed kidney transplant Right side - secondary to lymphoma in the kidney    History of      History of cataract surgery both eyes    History of myomectomy     Hx of hysterectomy     No significant past surgical history     Status post exploratory laparotomy

## 2023-03-31 NOTE — H&P ADULT - HISTORY OF PRESENT ILLNESS
61 yo F hx polycystic kidney disease, ESRD (on HD MWF), renal transplant in 1990, lymphoma in remission, CVA 3 weeks ago, sent in by dialysis center for Hb 6.6. Pt denies cp, sob, palpitations, feeling light headed/dizzy. Denies black/bloody stools. States she has had blood transfusions before for anemia 2/2 renal disease. 63 yo F hx polycystic kidney disease, ESRD (on HD MWF), renal transplant in 1990, lymphoma in remission, CVA 3 weeks ago, sent in by dialysis center for Hb 6.6.  Patient denies chest pain, sob, palpitations, feeling light headed/dizzy. No pain or medical concerns. Denies black/bloody stools. Does not make urine. Denies hematemesis. No signs of blood loss.;   States she has had blood transfusions before for anemia 2/2 renal disease. She reports a stroke 3 weeks ago, she was hospitalized in [facility] and [facility] She left rehab early due to issue with rooming.

## 2023-03-31 NOTE — ED PROVIDER NOTE - CLINICAL SUMMARY MEDICAL DECISION MAKING FREE TEXT BOX
63 yo F hx ESRD sent in for anemia <7. Plan for labs including t&s and likely transfuse. Pt denies hemoptyis/hematemesis/GIB, hx anemia from ESRD. Pt with asymptomatic anemia. No signs/symptoms of fluid overload, will obtain EKG and labs r/o hyperkalemia. admit for transfusion and dialysis vs speak with pt's renal group to reschedule dialysis for tomorrow if pt's labs wnl/no fluid overload if pt can tolerate and nephrologist in agreement since patient does not wish to be admitted. Pt understands she may require admission for dialysis.

## 2023-04-01 ENCOUNTER — TRANSCRIPTION ENCOUNTER (OUTPATIENT)
Age: 63
End: 2023-04-01

## 2023-04-01 VITALS
OXYGEN SATURATION: 100 % | TEMPERATURE: 98 F | SYSTOLIC BLOOD PRESSURE: 149 MMHG | HEART RATE: 72 BPM | DIASTOLIC BLOOD PRESSURE: 63 MMHG | RESPIRATION RATE: 18 BRPM

## 2023-04-01 LAB
ANION GAP SERPL CALC-SCNC: 17 MMOL/L — HIGH (ref 7–14)
BASOPHILS # BLD AUTO: 0.02 K/UL — SIGNIFICANT CHANGE UP (ref 0–0.2)
BASOPHILS NFR BLD AUTO: 0.4 % — SIGNIFICANT CHANGE UP (ref 0–2)
BUN SERPL-MCNC: 18 MG/DL — SIGNIFICANT CHANGE UP (ref 7–23)
CALCIUM SERPL-MCNC: 8.8 MG/DL — SIGNIFICANT CHANGE UP (ref 8.4–10.5)
CHLORIDE SERPL-SCNC: 95 MMOL/L — LOW (ref 98–107)
CO2 SERPL-SCNC: 26 MMOL/L — SIGNIFICANT CHANGE UP (ref 22–31)
CREAT SERPL-MCNC: 4.04 MG/DL — HIGH (ref 0.5–1.3)
EGFR: 12 ML/MIN/1.73M2 — LOW
EOSINOPHIL # BLD AUTO: 0.14 K/UL — SIGNIFICANT CHANGE UP (ref 0–0.5)
EOSINOPHIL NFR BLD AUTO: 3 % — SIGNIFICANT CHANGE UP (ref 0–6)
GLUCOSE SERPL-MCNC: 93 MG/DL — SIGNIFICANT CHANGE UP (ref 70–99)
HBV CORE AB SER-ACNC: SIGNIFICANT CHANGE UP
HBV SURFACE AB SER-ACNC: 296.6 MIU/ML — SIGNIFICANT CHANGE UP
HBV SURFACE AG SER-ACNC: SIGNIFICANT CHANGE UP
HCT VFR BLD CALC: 28.6 % — LOW (ref 34.5–45)
HCV AB S/CO SERPL IA: 0.07 S/CO — SIGNIFICANT CHANGE UP (ref 0–0.99)
HCV AB SERPL-IMP: SIGNIFICANT CHANGE UP
HGB BLD-MCNC: 9.1 G/DL — LOW (ref 11.5–15.5)
IANC: 3.33 K/UL — SIGNIFICANT CHANGE UP (ref 1.8–7.4)
IMM GRANULOCYTES NFR BLD AUTO: 0.2 % — SIGNIFICANT CHANGE UP (ref 0–0.9)
LYMPHOCYTES # BLD AUTO: 0.71 K/UL — LOW (ref 1–3.3)
LYMPHOCYTES # BLD AUTO: 15.4 % — SIGNIFICANT CHANGE UP (ref 13–44)
MCHC RBC-ENTMCNC: 30 PG — SIGNIFICANT CHANGE UP (ref 27–34)
MCHC RBC-ENTMCNC: 31.8 GM/DL — LOW (ref 32–36)
MCV RBC AUTO: 94.4 FL — SIGNIFICANT CHANGE UP (ref 80–100)
MONOCYTES # BLD AUTO: 0.41 K/UL — SIGNIFICANT CHANGE UP (ref 0–0.9)
MONOCYTES NFR BLD AUTO: 8.9 % — SIGNIFICANT CHANGE UP (ref 2–14)
MRSA PCR RESULT.: SIGNIFICANT CHANGE UP
NEUTROPHILS # BLD AUTO: 3.33 K/UL — SIGNIFICANT CHANGE UP (ref 1.8–7.4)
NEUTROPHILS NFR BLD AUTO: 72.1 % — SIGNIFICANT CHANGE UP (ref 43–77)
NRBC # BLD: 0 /100 WBCS — SIGNIFICANT CHANGE UP (ref 0–0)
NRBC # FLD: 0 K/UL — SIGNIFICANT CHANGE UP (ref 0–0)
PLATELET # BLD AUTO: 223 K/UL — SIGNIFICANT CHANGE UP (ref 150–400)
POTASSIUM SERPL-MCNC: 4 MMOL/L — SIGNIFICANT CHANGE UP (ref 3.5–5.3)
POTASSIUM SERPL-SCNC: 4 MMOL/L — SIGNIFICANT CHANGE UP (ref 3.5–5.3)
RBC # BLD: 3.03 M/UL — LOW (ref 3.8–5.2)
RBC # FLD: 16.9 % — HIGH (ref 10.3–14.5)
S AUREUS DNA NOSE QL NAA+PROBE: DETECTED
SODIUM SERPL-SCNC: 138 MMOL/L — SIGNIFICANT CHANGE UP (ref 135–145)
VIT B12 SERPL-MCNC: >2000 PG/ML — HIGH (ref 200–900)
WBC # BLD: 4.62 K/UL — SIGNIFICANT CHANGE UP (ref 3.8–10.5)
WBC # FLD AUTO: 4.62 K/UL — SIGNIFICANT CHANGE UP (ref 3.8–10.5)

## 2023-04-01 PROCEDURE — 99238 HOSP IP/OBS DSCHRG MGMT 30/<: CPT

## 2023-04-01 RX ORDER — ERYTHROPOIETIN 10000 [IU]/ML
10000 INJECTION, SOLUTION INTRAVENOUS; SUBCUTANEOUS
Qty: 0 | Refills: 0 | DISCHARGE
Start: 2023-04-01

## 2023-04-01 RX ORDER — HYDRALAZINE HCL 50 MG
100 TABLET ORAL THREE TIMES A DAY
Refills: 0 | Status: DISCONTINUED | OUTPATIENT
Start: 2023-04-01 | End: 2023-04-01

## 2023-04-01 RX ADMIN — Medication 81 MILLIGRAM(S): at 12:23

## 2023-04-01 RX ADMIN — ATORVASTATIN CALCIUM 40 MILLIGRAM(S): 80 TABLET, FILM COATED ORAL at 00:52

## 2023-04-01 RX ADMIN — CHLORHEXIDINE GLUCONATE 1 APPLICATION(S): 213 SOLUTION TOPICAL at 12:23

## 2023-04-01 RX ADMIN — OXCARBAZEPINE 150 MILLIGRAM(S): 300 TABLET, FILM COATED ORAL at 00:52

## 2023-04-01 RX ADMIN — Medication 50 MILLIGRAM(S): at 05:35

## 2023-04-01 RX ADMIN — AMLODIPINE BESYLATE 10 MILLIGRAM(S): 2.5 TABLET ORAL at 05:35

## 2023-04-01 RX ADMIN — Medication 10 MILLIGRAM(S): at 00:52

## 2023-04-01 NOTE — DISCHARGE NOTE NURSING/CASE MANAGEMENT/SOCIAL WORK - NSDCPEFALRISK_GEN_ALL_CORE
For information on Fall & Injury Prevention, visit: [email or web address] OR  [email or web address] OR  https://www.cdc.gov/steadi/patient.html

## 2023-04-01 NOTE — DISCHARGE NOTE PROVIDER - ATTENDING DISCHARGE PHYSICAL EXAMINATION:
Slightly elevated BP, other VSS, NAD, thin  Chest: CTA B/L  Heart: S1S2 ok  Abd: soft/NT/ND  extrem: no edema    BMI of 17.9 --> Underweight

## 2023-04-01 NOTE — PHYSICAL THERAPY INITIAL EVALUATION ADULT - ADDITIONAL COMMENTS
Pt states she lives with family in a house with 1 step to enter. At baseline pt reports ambulating independently without a device and was independent in ADLs. Pt reports she was at rehab, was discharged home with a wheelchair but has been ambulating. Pt states she was planning on starting outpatient physical therapy.    Following evaluation, pt was left semireclined in bed in no distress, all lines in tact, call bell in reach.

## 2023-04-01 NOTE — DISCHARGE NOTE PROVIDER - NSDCMRMEDTOKEN_GEN_ALL_CORE_FT
amLODIPine 10 mg oral tablet: 1 orally once a day  aspirin 81 mg oral tablet: orally once a day  atorvastatin 40 mg oral tablet: 1 orally  hydrALAZINE 100 mg oral tablet: 1 orally 3 times a day  losartan 100 mg oral tablet: 1 orally once a day  metoprolol succinate 50 mg oral capsule, extended release: 1 orally once a day  Trileptal 150 mg oral tablet: 1 orally once a day (at bedtime)   amLODIPine 10 mg oral tablet: 1 orally once a day  aspirin 81 mg oral tablet: orally once a day  atorvastatin 40 mg oral tablet: 1 orally  epoetin pastora: 10,000 unit(s) intravenous 3 times a week with hemodialysis  hydrALAZINE 100 mg oral tablet: 1 orally 3 times a day  losartan 100 mg oral tablet: 1 orally once a day  metoprolol succinate 50 mg oral capsule, extended release: 1 orally once a day  Outpatient Physical Therapy: Please have outpatient physical therapy services 3-5x/week for 2-4 weeks for balance training; bed mobility training; gait training; strengthening; transfer training  Trileptal 150 mg oral tablet: 1 orally once a day (at bedtime)

## 2023-04-01 NOTE — DISCHARGE NOTE NURSING/CASE MANAGEMENT/SOCIAL WORK - PATIENT PORTAL LINK FT
You can access the FollowMyHealth Patient Portal offered by [facility] by registering at the following website: [email or web address]. By joining [facility]’s FollowMyHealth portal, you will also be able to view your health information using other applications (apps) compatible with our system.

## 2023-04-01 NOTE — DISCHARGE NOTE PROVIDER - HOSPITAL COURSE
63 yo F hx polycystic kidney disease, ESRD (on HD MWF), renal transplant in 1990, lymphoma in remission, CVA 3 weeks ago, sent in by dialysis center for Hb 6.6.    ESRD (end stage renal disease).   - Renal following, continued HD schedule     Anemia.   - Was found to have hgb of 6.6 outpatient. On admission 7.2   - s/p 1U PRBC Hgb 7.2 --> 9.1   - EPO per renal.    High cholesterol.   - continued statin.    Hypertension.   - Patient on 4 BP agents, Will slowly re-start home meds  - Hold home losartan and hydralazine, re-start as needed   - Continue amlodipine and metoprolol 50mg ER, with hold parameters.    CVA (cerebrovascular accident).   - PT: outpatient PT   - Continue aspirin and statin.     Patient is medically optimized for discharge. Case/labs/medications discussed with Dr. Donovan on 4/1/23. 61 yo F hx polycystic kidney disease, ESRD (on HD MWF), renal transplant in 1990, lymphoma in remission, CVA 3 weeks ago, sent in by dialysis center for Hb 6.6.    ESRD (end stage renal disease).   - Renal following, continued HD schedule     Anemia.   - Was found to have hgb of 6.6 outpatient. On admission 7.2   - s/p 1U PRBC Hgb 7.2 --> 9.1   - Anemia labs reviewed   - EPO per renal.    High cholesterol.   - continued statin.    Hypertension.   - Patient on 4 BP agents, Will slowly re-start home meds  - Hold home losartan and hydralazine, re-start as needed   - Continue amlodipine and metoprolol 50mg ER, with hold parameters.    CVA (cerebrovascular accident).   - PT: outpatient PT   - Continue aspirin and statin.     Patient is medically optimized for discharge. Case/labs/medications discussed with Dr. Donovan on 4/1/23. 63 yo F hx polycystic kidney disease, ESRD (on HD MWF), renal transplant in 1990, lymphoma in remission, CVA 3 weeks ago, sent in by dialysis center for Hb 6.6.    ESRD (end stage renal disease).   - Renal following, continued HD schedule     Anemia.   - Was found to have hgb of 6.6 outpatient. On admission 7.2   - s/p 1U PRBC Hgb 7.2 --> 9.1   - Anemia labs reviewed   - EPO per renal.    High cholesterol.   - continued statin.    Hypertension.   - Patient on 4 BP agents, Will slowly re-start home meds  - Hold home losartan and hydralazine, ok to be resumed on discharge  - Continue amlodipine and metoprolol 50mg ER, with hold parameters.    CVA (cerebrovascular accident).   - PT: outpatient PT   - Continue aspirin and statin.     Patient is medically optimized for discharge. Case/labs/medications discussed with Dr. Donovan on 4/1/23.

## 2023-04-01 NOTE — PHYSICAL THERAPY INITIAL EVALUATION ADULT - GENERAL OBSERVATIONS, REHAB EVAL
Pt encountered sitting at edge of bed in no distress. Blood pressure = 158/58, heart rate = 70bpm, SpO2 + 100%.

## 2023-04-01 NOTE — DISCHARGE NOTE PROVIDER - CARE PROVIDER_API CALL
[facility]  Internal Medicine  [address] [place] [address]  Phone: [phone]  Fax: [phone]  Follow Up Time:

## 2023-04-01 NOTE — PHYSICAL THERAPY INITIAL EVALUATION ADULT - GAIT DISTANCE, PT EVAL
pt deferred further ambulation, states she did not eat all night and is hungry, sees breakfast arriving and would prefer to eat./25 feet

## 2023-04-01 NOTE — PHYSICAL THERAPY INITIAL EVALUATION ADULT - PERTINENT HX OF CURRENT PROBLEM, REHAB EVAL
62 year old female sent in by dialysis center for Hb 6.6. Pt post stroke 3 weeks ago, [facility] was [facility]

## 2023-04-01 NOTE — DISCHARGE NOTE PROVIDER - NSDCCPCAREPLAN_GEN_ALL_CORE_FT
PRINCIPAL DISCHARGE DIAGNOSIS  Diagnosis: Anemia  Assessment and Plan of Treatment: You were admitted for low hemoglobin. You received a transfusion and your repeat count was stable. Follow-up with your outpatient provider for further monitoring of your blood counts.   Monitor for signs/symptoms indicating worsening of disease, such as, easy bleeding/bruising, pale skin, fatigue, dizziness, increased heart rate, or chest pain.      SECONDARY DISCHARGE DIAGNOSES  Diagnosis: ESRD on dialysis  Assessment and Plan of Treatment: Please continue to follow your dialysis schedule and refer to your primary provider/nephrologist for further care and monitoring of kidney function and electrolytes. Continue a renal restricted diet (Avoiding foods high in potassium and phosphorus), your prescribed medications, and supplementations as directed.    Diagnosis: Hypertension  Assessment and Plan of Treatment: Continue blood pressure medication regimen as directed. Monitor for any visual changes, headaches or dizziness.  Monitor blood pressure regularly.  Follow up with your primary care provider for further management for high blood pressure.

## 2023-04-26 NOTE — PATIENT PROFILE ADULT - ARE SIGNIFICANT INDICATORS COMPLETE.
No Hide Include Location In Plan Question?: No Detail Level: Generalized Include Location In Plan?: Yes

## 2023-09-11 ENCOUNTER — INPATIENT (INPATIENT)
Facility: HOSPITAL | Age: 63
LOS: 3 days | Discharge: HOME | End: 2023-09-15
Attending: INTERNAL MEDICINE | Admitting: INTERNAL MEDICINE
Payer: MEDICARE

## 2023-09-11 VITALS
OXYGEN SATURATION: 97 % | SYSTOLIC BLOOD PRESSURE: 196 MMHG | DIASTOLIC BLOOD PRESSURE: 66 MMHG | TEMPERATURE: 98 F | HEART RATE: 87 BPM | RESPIRATION RATE: 15 BRPM

## 2023-09-11 DIAGNOSIS — J81.1 CHRONIC PULMONARY EDEMA: ICD-10-CM

## 2023-09-11 LAB
ALBUMIN SERPL ELPH-MCNC: 4.2 G/DL — SIGNIFICANT CHANGE UP (ref 3.3–5)
ALP SERPL-CCNC: 161 U/L — HIGH (ref 40–120)
ALT FLD-CCNC: SIGNIFICANT CHANGE UP U/L (ref 4–33)
ANION GAP SERPL CALC-SCNC: 18 MMOL/L — HIGH (ref 7–14)
APTT BLD: 32.2 SEC — SIGNIFICANT CHANGE UP (ref 24.5–35.6)
AST SERPL-CCNC: SIGNIFICANT CHANGE UP U/L (ref 4–32)
BASE EXCESS BLDV CALC-SCNC: -0.5 MMOL/L — SIGNIFICANT CHANGE UP (ref -2–3)
BASOPHILS # BLD AUTO: 0.02 K/UL — SIGNIFICANT CHANGE UP (ref 0–0.2)
BASOPHILS NFR BLD AUTO: 0.5 % — SIGNIFICANT CHANGE UP (ref 0–2)
BILIRUB SERPL-MCNC: 0.4 MG/DL — SIGNIFICANT CHANGE UP (ref 0.2–1.2)
BLD GP AB SCN SERPL QL: NEGATIVE — SIGNIFICANT CHANGE UP
BLOOD GAS VENOUS COMPREHENSIVE RESULT: SIGNIFICANT CHANGE UP
BUN SERPL-MCNC: 79 MG/DL — HIGH (ref 7–23)
CALCIUM SERPL-MCNC: 9.9 MG/DL — SIGNIFICANT CHANGE UP (ref 8.4–10.5)
CHLORIDE BLDV-SCNC: 94 MMOL/L — LOW (ref 96–108)
CHLORIDE SERPL-SCNC: 91 MMOL/L — LOW (ref 98–107)
CO2 BLDV-SCNC: 25.4 MMOL/L — SIGNIFICANT CHANGE UP (ref 22–26)
CO2 SERPL-SCNC: 21 MMOL/L — LOW (ref 22–31)
CREAT SERPL-MCNC: 8.54 MG/DL — HIGH (ref 0.5–1.3)
EGFR: 5 ML/MIN/1.73M2 — LOW
EOSINOPHIL # BLD AUTO: 0.16 K/UL — SIGNIFICANT CHANGE UP (ref 0–0.5)
EOSINOPHIL NFR BLD AUTO: 3.9 % — SIGNIFICANT CHANGE UP (ref 0–6)
GAS PNL BLDV: 129 MMOL/L — LOW (ref 136–145)
GAS PNL BLDV: SIGNIFICANT CHANGE UP
GLUCOSE BLDV-MCNC: 92 MG/DL — SIGNIFICANT CHANGE UP (ref 70–99)
GLUCOSE SERPL-MCNC: 96 MG/DL — SIGNIFICANT CHANGE UP (ref 70–99)
HCO3 BLDV-SCNC: 24 MMOL/L — SIGNIFICANT CHANGE UP (ref 22–29)
HCT VFR BLD CALC: 28.8 % — LOW (ref 34.5–45)
HCT VFR BLDA CALC: 31 % — LOW (ref 34.5–46.5)
HGB BLD CALC-MCNC: 10.3 G/DL — LOW (ref 11.7–16.1)
HGB BLD-MCNC: 9.5 G/DL — LOW (ref 11.5–15.5)
IANC: 2.6 K/UL — SIGNIFICANT CHANGE UP (ref 1.8–7.4)
IMM GRANULOCYTES NFR BLD AUTO: 0.2 % — SIGNIFICANT CHANGE UP (ref 0–0.9)
INR BLD: 0.98 RATIO — SIGNIFICANT CHANGE UP (ref 0.85–1.18)
LACTATE BLDV-MCNC: 1.1 MMOL/L — SIGNIFICANT CHANGE UP (ref 0.5–2)
LYMPHOCYTES # BLD AUTO: 1 K/UL — SIGNIFICANT CHANGE UP (ref 1–3.3)
LYMPHOCYTES # BLD AUTO: 24.3 % — SIGNIFICANT CHANGE UP (ref 13–44)
MAGNESIUM SERPL-MCNC: 2.3 MG/DL — SIGNIFICANT CHANGE UP (ref 1.6–2.6)
MCHC RBC-ENTMCNC: 30 PG — SIGNIFICANT CHANGE UP (ref 27–34)
MCHC RBC-ENTMCNC: 33 GM/DL — SIGNIFICANT CHANGE UP (ref 32–36)
MCV RBC AUTO: 90.9 FL — SIGNIFICANT CHANGE UP (ref 80–100)
MONOCYTES # BLD AUTO: 0.33 K/UL — SIGNIFICANT CHANGE UP (ref 0–0.9)
MONOCYTES NFR BLD AUTO: 8 % — SIGNIFICANT CHANGE UP (ref 2–14)
NEUTROPHILS # BLD AUTO: 2.6 K/UL — SIGNIFICANT CHANGE UP (ref 1.8–7.4)
NEUTROPHILS NFR BLD AUTO: 63.1 % — SIGNIFICANT CHANGE UP (ref 43–77)
NRBC # BLD: 0 /100 WBCS — SIGNIFICANT CHANGE UP (ref 0–0)
NRBC # FLD: 0 K/UL — SIGNIFICANT CHANGE UP (ref 0–0)
NT-PROBNP SERPL-SCNC: HIGH PG/ML
PCO2 BLDV: 39 MMHG — SIGNIFICANT CHANGE UP (ref 39–52)
PH BLDV: 7.4 — SIGNIFICANT CHANGE UP (ref 7.32–7.43)
PLATELET # BLD AUTO: 181 K/UL — SIGNIFICANT CHANGE UP (ref 150–400)
PO2 BLDV: 32 MMHG — SIGNIFICANT CHANGE UP (ref 25–45)
POTASSIUM BLDV-SCNC: 6.4 MMOL/L — CRITICAL HIGH (ref 3.5–5.1)
POTASSIUM SERPL-MCNC: SIGNIFICANT CHANGE UP MMOL/L (ref 3.5–5.3)
POTASSIUM SERPL-SCNC: SIGNIFICANT CHANGE UP MMOL/L (ref 3.5–5.3)
PROT SERPL-MCNC: SIGNIFICANT CHANGE UP G/DL (ref 6–8.3)
PROTHROM AB SERPL-ACNC: 11.1 SEC — SIGNIFICANT CHANGE UP (ref 9.5–13)
RBC # BLD: 3.17 M/UL — LOW (ref 3.8–5.2)
RBC # FLD: 15.5 % — HIGH (ref 10.3–14.5)
RH IG SCN BLD-IMP: POSITIVE — SIGNIFICANT CHANGE UP
SAO2 % BLDV: 44.8 % — LOW (ref 67–88)
SODIUM SERPL-SCNC: 130 MMOL/L — LOW (ref 135–145)
TROPONIN T, HIGH SENSITIVITY RESULT: 88 NG/L — CRITICAL HIGH
TROPONIN T, HIGH SENSITIVITY RESULT: 94 NG/L — CRITICAL HIGH
WBC # BLD: 4.12 K/UL — SIGNIFICANT CHANGE UP (ref 3.8–10.5)
WBC # FLD AUTO: 4.12 K/UL — SIGNIFICANT CHANGE UP (ref 3.8–10.5)

## 2023-09-11 PROCEDURE — 99291 CRITICAL CARE FIRST HOUR: CPT | Mod: GC

## 2023-09-11 PROCEDURE — 99223 1ST HOSP IP/OBS HIGH 75: CPT

## 2023-09-11 PROCEDURE — 71045 X-RAY EXAM CHEST 1 VIEW: CPT | Mod: 26

## 2023-09-11 RX ORDER — ERYTHROPOIETIN 10000 [IU]/ML
10000 INJECTION, SOLUTION INTRAVENOUS; SUBCUTANEOUS
Refills: 0 | Status: DISCONTINUED | OUTPATIENT
Start: 2023-09-11 | End: 2023-09-15

## 2023-09-11 RX ORDER — ERYTHROPOIETIN 10000 [IU]/ML
10000 INJECTION, SOLUTION INTRAVENOUS; SUBCUTANEOUS
Refills: 0 | Status: DISCONTINUED | OUTPATIENT
Start: 2023-09-11 | End: 2023-09-11

## 2023-09-11 NOTE — H&P ADULT - PROBLEM SELECTOR PLAN 3
- Had L sided chest pain with her SOB, non-radiating, chest pain now resolved  - Trops elevated but stable given ESRD, CK and CKMB without acute findings, EKG non-ischemic  - Check TTE  - Monitor on telemetry  - Cards eval in AM (as per primary team)  - See if we can obtain records of her cardiac angiogram from [facility] and clarify if any interventions were done

## 2023-09-11 NOTE — ED PROVIDER NOTE - CLINICAL SUMMARY MEDICAL DECISION MAKING FREE TEXT BOX
63-year-old female with a history of anemia requiring blood transfusions, ESRD on HD Monday Wednesday Friday, with recent hospitalization for low hemoglobin presenting due to concern of shortness of breath. Differential diagnosis includes but is not limited to fluid overload causing pulmonary edema, pneumonia, viral infection. Pt missed her HD and needs to be get dialysis. She is saturating well on 4 L NC, would get cxr, labs, ecg, and consult nephrology. prior charts reviewed to find who takes care of her HD while admitted here. pt tba. Maddie att: 63-year-old female with a history of anemia requiring blood transfusions, ESRD on HD Monday Wednesday Friday, with recent hospitalization for low hemoglobin presenting due to concern of shortness of breath. Differential diagnosis includes but is not limited to fluid overload causing pulmonary edema, pneumonia, viral infection. Pt missed her HD and needs to be get dialysis. She is saturating well on 4 L NC, would get cxr, labs, ecg, and consult nephrology. prior charts reviewed to find who takes care of her HD while admitted here. pt irmaa.

## 2023-09-11 NOTE — H&P ADULT - PROBLEM SELECTOR PLAN 4
- Recent acute CVA, on examination has R sided hemianopsia, R facial droop, has b/l UE 4+/5 weakness but seems more effort related, 5/5 strength b/l LE, SILT b/l  - Passed dysphagia screen, will place on minced and moist diet, obtain S&S eval  - Neuro checks q4h  - c/w aspirin and brilinta (unclear if she is on DAPT for CVA or for cardiac issues)  - Check CTH to assess evolution of CVA  - Neurology consulted

## 2023-09-11 NOTE — ED PROVIDER NOTE - WR ORDER NAME 1
Patient Education     - utilize saline rinse w/ frequent suctioning using the [address]  - place humidifier near crib   - follow up with your pediatrician tomorrow      Respiratory Syncytial Virus (RSV) in Children: Care Instructions  Your Care Instructions  Respiratory syncytial virus (RSV) is a viral illness that causes symptoms like those of a bad cold. It is most common in babies. RSV spreads easily. It goes away on its own and usually does not cause major health problems. However, it can lead to other problems, such as bronchiolitis. Children with this illness may wheeze and make a lot of mucus. Lots of rest and plenty of fluids can help your child get well. Most children feel better in one to two weeks. Follow-up care is a key part of your child's treatment and safety. Be sure to make and go to all appointments, and call your doctor if your child is having problems. It's also a good idea to know your child's test results and keep a list of the medicines your child takes. How can you care for your child at home? · Be safe with medicines. Have your child take medicine exactly as prescribed. Do not stop or change a medicine without talking to your child's doctor first.  · Give your child lots of fluids. Offer your baby breastfeeding or bottle-feeding more often. Do not give your baby sports drinks, soft drinks, or undiluted fruit juice, as these may have too much sugar, too few calories, or not enough minerals. · Give your child sips of water or drinks such as Pedialyte or Infalyte. These drinks contain the right mix of salt, sugar, and minerals. You can buy them at drugstores or grocery stores. Do not use them as the only source of liquids or food for more than 12 to 24 hours. · If your child has problems breathing because of a stuffy nose, squirt a few saline (saltwater) nasal drops in one nostril. For older children, have your child blow his or her nose. Repeat for the other nostril.  For babies, put a drop or two in one nostril. Using a soft rubber suction bulb, squeeze air out of the bulb, and gently place the tip of the bulb inside the baby's nose. Relax your hand to suck the mucus from the nose. Repeat in the other nostril. · Give acetaminophen (Tylenol) or ibuprofen (Advil, Motrin) for fever if your child's doctor says it is okay. Read and follow all instructions on the label. Do not give aspirin to anyone younger than 20. It has been linked to Reye syndrome, a serious illness. · Be careful with cough and cold medicines. Don't give them to children younger than 6, because they don't work for children that age and can even be harmful. For children 6 and older, always follow all the instructions carefully. Make sure you know how much medicine to give and how long to use it. And use the dosing device if one is included. · Be careful when giving your child over-the-counter cold or flu medicines and Tylenol at the same time. Many of these medicines have acetaminophen, which is Tylenol. Read the labels to make sure that you are not giving your child more than the recommended dose. Too much acetaminophen (Tylenol) can be harmful. · Keep your child away from smoke. Smoke irritates the breathing tubes and slows healing. When should you call for help? Call 911 anytime you think your child may need emergency care. For example, call if:    · Your child has severe trouble breathing. Signs may include the chest sinking in, using belly muscles to breathe, or nostrils flaring while your child is struggling to breathe.     · Your child is groggy, confused, or much more sleepy than usual.    Call your doctor now or seek immediate medical care if:    · Your child's fever gets worse.     · Your baby is younger than 3 months and has a fever.     · Your child gets tired during feeding because of trying to breathe. The child either stops eating or sucks in air to catch a breath.  The child loses interest in eating because of the effort it takes.     · Your child has signs of needing more fluids. These signs include sunken eyes with few tears, dry mouth with little or no spit, and little or no urine for 6 hours.     · Your child starts breathing faster than usual.     · Your child uses the muscles in his or her neck, chest, and stomach when taking in air.    Watch closely for changes in your child's health, and be sure to contact your doctor if:    · Your child is 3 months to 1years old and has a fever of 104°F or has a fever of 102°F to 104°F that does not go down after 12 hours.     · Your child's symptoms get worse, or your child has any new symptoms.     · Your child does not get better as expected. Where can you learn more? Go to http://vivian-el.info/. Enter V852 in the search box to learn more about \"Respiratory Syncytial Virus (RSV) in Children: Care Instructions. \"  Current as of: December 12, 2018  Content Version: 12.2  © 7745-7127 [employer]. Care instructions adapted under license by [employer] (which disclaims liability or warranty for this information). If you have questions about a medical condition or this instruction, always ask your healthcare professional. [place] [address] any warranty or liability for your use of this information. Patient Education        Bronchiolitis in Children: Care Instructions  Your Care Instructions    Bronchiolitis is a common respiratory illness in babies and very young children. It happens when the bronchiole tubes that carry air to the lungs get inflamed. This can make your child cough or wheeze. It can start like a cold with a runny nose, congestion, and a cough. In many cases, there is a fever for a few days. The congestion can last a few weeks. The cough can last even longer. Most children feel better in 1 to 2 weeks. Bronchiolitis is caused by a virus.  This means that antibiotics won't help it get better. Most of the time, you can take care of your child at home. But if your child is not getting better or has a hard time breathing, he or she may need to be in the hospital.  Follow-up care is a key part of your child's treatment and safety. Be sure to make and go to all appointments, and call your doctor if your child is having problems. It's also a good idea to know your child's test results and keep a list of the medicines your child takes. How can you care for your child at home? · Have your child drink a lot of fluids. · Give acetaminophen (Tylenol) or ibuprofen (Advil, Motrin) for fever. Be safe with medicines. Read and follow all instructions on the label. Do not give aspirin to anyone younger than 20. It has been linked to Reye syndrome, a serious illness. · Do not give a child two or more pain medicines at the same time unless the doctor told you to. Many pain medicines have acetaminophen, which is Tylenol. Too much acetaminophen (Tylenol) can be harmful. · Keep your child away from other children while he or she is sick. · Wash your hands and your child's hands many times a day. You can also use hand gels or wipes that contain alcohol. This helps prevent spreading the virus to another person. When should you call for help? Call 911 anytime you think your child may need emergency care. For example, call if:    · Your child has severe trouble breathing.  Signs may include the chest sinking in, using belly muscles to breathe, or nostrils flaring while your child is struggling to breathe.    Call your doctor now or seek immediate medical care if:    · Your child has more breathing problems or is breathing faster.     · You can see your child's skin around the ribs or the neck (or both) sink in deeply when he or she breathes in.     · Your child's breathing problems make it hard to eat or drink.     · Your child's face, hands, and feet look a little gray or purple.     · Your child has a new or higher fever.    Watch closely for changes in your child's health, and be sure to contact your doctor if:    · Your child is not getting better as expected. Where can you learn more? Go to http://vivian-el.info/. Enter Q366 in the search box to learn more about \"Bronchiolitis in Children: Care Instructions. \"  Current as of: December 12, 2018  Content Version: 12.2  © 3327-3474 [employer], [facility]. Care instructions adapted under license by [employer] (which disclaims liability or warranty for this information). If you have questions about a medical condition or this instruction, always ask your healthcare professional. [address] any warranty or liability for your use of this information. Xray Chest 1 View AP/PA

## 2023-09-11 NOTE — H&P ADULT - NSICDXPASTMEDICALHX_GEN_ALL_CORE_FT
PAST MEDICAL HISTORY:  CKD (chronic kidney disease)     CVA (cerebrovascular accident)     Depression, major     Dyslipidemia     ESRD (end stage renal disease) dialysis M, W, F    HTN (hypertension)

## 2023-09-11 NOTE — H&P ADULT - PROBLEM SELECTOR PLAN 5
- Anemia likely 2/2 ESRD but patient also reports having had a GI work up with endoscopy and capsule endoscopy with findings of "dried blood" in her intestines as per patient  - On pantoprazole 40mg BID, will c/w  - Monitor H/H  - See if we can obtain her recent hospitalization records from [facility]

## 2023-09-11 NOTE — ED PROVIDER NOTE - PHYSICAL EXAMINATION
General: chronically ill-appearing, pt experiencing some trouble breathing  Head: Atraumatic, normocephalic  Eyes: EOM grossly in tact, no scleral icterus, no discharge  Neurology: A&Ox 3  Respiratory: crackles present, no wheezing,   CV: RRR, good s1/s2, no S3, Extremities warm and well perfused  Abdominal: Soft, non-distended, non-tender, no masses  Extremities: No edema, no deformities  Skin: warm and dry. No rashes  POCUS showing B lines throughout with bilateral pleural effusions.

## 2023-09-11 NOTE — CONSULT NOTE ADULT - SUBJECTIVE AND OBJECTIVE BOX
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      History of myomectomy      Status post exploratory laparotomy      No significant past surgical history          MEDICATIONS  (STANDING):      Allergies    metoclopramide (Unknown)  Originally Entered as [Unknown] reaction to [Other][Buscopan] (Unknown)  latex (Unknown)  MAXALONE (Unknown)  adhesives (Unknown)  scopolamine (Unknown)    Intolerances        SOCIAL HISTORY:  Denies ETOh,Smoking,     FAMILY HISTORY:  No pertinent family history in first degree relatives        REVIEW OF SYSTEMS:  CONSTITUTIONAL: No weakness, fevers or chills  EYES/ENT: No visual changes;  No vertigo or throat pain   NECK: No pain or stiffness  RESPIRATORY: No cough, wheezing, hemoptysis; No shortness of breath  CARDIOVASCULAR: No chest pain or palpitations  GASTROINTESTINAL: No abdominal or epigastric pain. No nausea, vomiting, or hematemesis; No diarrhea or constipation. No melena or hematochezia.  GENITOURINARY: No dysuria, frequency or hematuria  NEUROLOGICAL: No numbness or weakness  SKIN: No itching, burning, rashes, or lesions   All other review of systems is negative unless indicated above.    VITAL:  T(C): , Max: 36.6 (23 @ 20:37)  T(F): , Max: 97.9 (23 @ 20:37)  HR: 78 (23 @ 21:53)  BP: 187/63 (23 @ 21:53)  BP(mean): --  RR: 18 (23 @ 21:53)  SpO2: 99% (23 @ 21:53)  Wt(kg): --    PHYSICAL EXAM:  Constitutional: NAD, Alert  HEENT: NCAT, MMM  Neck: Supple, No JVD  Respiratory: CTA-b/l  Cardiovascular: RRR s1s2, no m/r/g  Gastrointestinal: BS+, soft, NT/ND  Extremities: No peripheral edema b/l  Neurological: no focal deficits; strength grossly intact  Back: no CVAT b/l  Skin: No rashes, no nevi    LABS:                        9.5    4.12  )-----------( 181      ( 11 Sep 2023 20:45 )             28.8     Na(130)/K(TNP)/Cl(91)/HCO3(21)/BUN(79)/Cr(8.54)Glu(96)/Ca(9.9)/Mg(2.30)/PO4(--)     @ 20:45    Urinalysis Basic - ( 11 Sep 2023 20:45 )    Color: x / Appearance: x / SG: x / pH: x  Gluc: 96 mg/dL / Ketone: x  / Bili: x / Urobili: x   Blood: x / Protein: x / Nitrite: x   Leuk Esterase: x / RBC: x / WBC x   Sq Epi: x / Non Sq Epi: x / Bacteria: x            IMAGING:    ASSESSMENT:  61 yo F hx polycystic kidney disease, ESRD (on HD MWF), renal transplant in , lymphoma in remission, CVA  came with sob. nephrology  consulted  for emergent dialysis.     ESRD --MWF -missed Hd today ,getting hD under Dr Spear  at [facility] on exam -anuric  hyperkalemia - renal  related   blood pressure elevated   resp-  b/l pleural  effusion -need pul consult.    RECOMMEND:  -emergent dialysis with UF 2.5 -3 liters   -renal diet   -resume home BP meds .      d-w dialysis  unit.  Thank you for involving [facility] in this patient's care.    With warm regards,    Christoph Spear MD   [facility]  Office: [phone]  Cell: [phone]           Patient is a 63y old  Female who presents with a chief complaint of     HPI:  · HPI Objective Statement: 63-year-old female with a history of anemia requiring blood transfusions, ESRD on HD , with recent hospitalization for low hemoglobin presenting due to concern of shortness of breath.  Patient states that she is supposed to have dialysis today but because she did not want to stay in the hospital she missed her outpatient appointment.  She left the hospital because she did not like the care she was receiving there.  Patient follows with [facility] for nephrology Dr. Christoph Spear.  No fever, chills, nausea, vomiting.  Patient arrives with EMS on a nonrebreather, in the field her O2 saturation on room air was 83%. She is 100% on NRB on arrival. Patient does not wear oxygen at baseline.      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      History of myomectomy      Status post exploratory laparotomy      No significant past surgical history          MEDICATIONS  (STANDING):      Allergies    metoclopramide (Unknown)  Originally Entered as [Unknown] reaction to [Other][Buscopan] (Unknown)  latex (Unknown)  MAXALONE (Unknown)  adhesives (Unknown)  scopolamine (Unknown)    Intolerances        SOCIAL HISTORY:  Denies ETOh,Smoking,     FAMILY HISTORY:  No pertinent family history in first degree relatives        REVIEW OF SYSTEMS:  CONSTITUTIONAL: No weakness, fevers or chills  EYES/ENT: No visual changes;  No vertigo or throat pain   NECK: No pain or stiffness  RESPIRATORY: No cough, wheezing, hemoptysis; No shortness of breath  CARDIOVASCULAR: No chest pain or palpitations  GASTROINTESTINAL: No abdominal or epigastric pain. No nausea, vomiting, or hematemesis; No diarrhea or constipation. No melena or hematochezia.  GENITOURINARY: No dysuria, frequency or hematuria  NEUROLOGICAL: No numbness or weakness  SKIN: No itching, burning, rashes, or lesions   All other review of systems is negative unless indicated above.    VITAL:  T(C): , Max: 36.6 (23 @ 20:37)  T(F): , Max: 97.9 (23 @ 20:37)  HR: 78 (23 @ 21:53)  BP: 187/63 (23 @ 21:53)  BP(mean): --  RR: 18 (23 @ 21:53)  SpO2: 99% (23 @ 21:53)  Wt(kg): --    PHYSICAL EXAM:  Constitutional: NAD, Alert  HEENT: NCAT, MMM  Neck: Supple, No JVD  Respiratory: CTA-b/l  Cardiovascular: RRR s1s2, no m/r/g  Gastrointestinal: BS+, soft, NT/ND  Extremities: No peripheral edema b/l  Neurological: no focal deficits; strength grossly intact  Back: no CVAT b/l  Skin: No rashes, no nevi    LABS:                        9.5    4.12  )-----------( 181      ( 11 Sep 2023 20:45 )             28.8     Na(130)/K(TNP)/Cl(91)/HCO3(21)/BUN(79)/Cr(8.54)Glu(96)/Ca(9.9)/Mg(2.30)/PO4(--)     @ 20:45    Urinalysis Basic - ( 11 Sep 2023 20:45 )    Color: x / Appearance: x / SG: x / pH: x  Gluc: 96 mg/dL / Ketone: x  / Bili: x / Urobili: x   Blood: x / Protein: x / Nitrite: x   Leuk Esterase: x / RBC: x / WBC x   Sq Epi: x / Non Sq Epi: x / Bacteria: x            IMAGING:    ASSESSMENT:  61 yo F hx polycystic kidney disease, ESRD (on HD MWF), renal transplant in , lymphoma in remission, CVA  came with sob. nephrology  consulted  for emergent dialysis.     ESRD --MWF -missed Hd today ,getting hD under Dr Spear  at [facility] on exam -anuric  hyperkalemia - renal  related   blood pressure elevated   resp-  b/l pleural  effusion -need pul consult.    RECOMMEND:  -emergent dialysis with UF 2.5 -3 liters   -renal diet   -resume home BP meds .      d-w dialysis  unit.  Thank you for involving [facility] in this patient's care.    With warm regards    Maggy jacob  [facility]  Office: [phone]           Patient is a 63y old  Female who presents with a chief complaint of     HPI:  · HPI Objective Statement: 63-year-old female with a history of anemia requiring blood transfusions, ESRD on HD , with recent hospitalization for low hemoglobin presenting due to concern of shortness of breath.  Patient states that she is supposed to have dialysis today but because she did not want to stay in the hospital she missed her outpatient appointment.  She left the hospital because she did not like the care she was receiving there.  Patient follows with [facility] for nephrology Dr. Christoph Spear.  No fever, chills, nausea, vomiting.  Patient arrives with EMS on a nonrebreather, in the field her O2 saturation on room air was 83%. She is 100% on NRB on arrival. Patient does not wear oxygen at baseline.      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      History of myomectomy      Status post exploratory laparotomy      No significant past surgical history          MEDICATIONS  (STANDING):      Allergies    metoclopramide (Unknown)  Originally Entered as [Unknown] reaction to [Other][Buscopan] (Unknown)  latex (Unknown)  MAXALONE (Unknown)  adhesives (Unknown)  scopolamine (Unknown)    Intolerances        SOCIAL HISTORY:  Denies ETOh,Smoking,     FAMILY HISTORY:  No pertinent family history in first degree relatives        REVIEW OF SYSTEMS:  CONSTITUTIONAL: No weakness, fevers or chills  EYES/ENT: No visual changes;  No vertigo or throat pain   NECK: No pain or stiffness  RESPIRATORY: No cough, wheezing, hemoptysis; No shortness of breath  CARDIOVASCULAR: No chest pain or palpitations  GASTROINTESTINAL: No abdominal or epigastric pain. No nausea, vomiting, or hematemesis; No diarrhea or constipation. No melena or hematochezia.  GENITOURINARY: No dysuria, frequency or hematuria  NEUROLOGICAL: No numbness or weakness  SKIN: No itching, burning, rashes, or lesions   All other review of systems is negative unless indicated above.    VITAL:  T(C): , Max: 36.6 (23 @ 20:37)  T(F): , Max: 97.9 (23 @ 20:37)  HR: 78 (23 @ 21:53)  BP: 187/63 (23 @ 21:53)  BP(mean): --  RR: 18 (23 @ 21:53)  SpO2: 99% (23 @ 21:53)  Wt(kg): --    PHYSICAL EXAM:  Constitutional: NAD, Alert  HEENT: NCAT, MMM  Neck: Supple, No JVD  Respiratory: CTA-b/l  Cardiovascular: RRR s1s2, no m/r/g  Gastrointestinal: BS+, soft, NT/ND  Extremities: No peripheral edema b/l  Neurological: no focal deficits; strength grossly intact  Back: no CVAT b/l  Skin: No rashes, no nevi    LABS:                        9.5    4.12  )-----------( 181      ( 11 Sep 2023 20:45 )             28.8     Na(130)/K(TNP)/Cl(91)/HCO3(21)/BUN(79)/Cr(8.54)Glu(96)/Ca(9.9)/Mg(2.30)/PO4(--)     @ 20:45    Urinalysis Basic - ( 11 Sep 2023 20:45 )    Color: x / Appearance: x / SG: x / pH: x  Gluc: 96 mg/dL / Ketone: x  / Bili: x / Urobili: x   Blood: x / Protein: x / Nitrite: x   Leuk Esterase: x / RBC: x / WBC x   Sq Epi: x / Non Sq Epi: x / Bacteria: x            IMAGING:    ASSESSMENT:  61 yo F hx polycystic kidney disease, ESRD (on HD MWF), renal transplant in , lymphoma in remission, CVA  came with sob. nephrology  consulted  for emergent dialysis.     ESRD --MWF -missed Hd today ,getting hD under Dr Spear  at [facility] on exam -anuric  hyperkalemia - renal  related   blood pressure elevated   resp-  b/l pleural  effusion -need pul consult.  anemia-ESRD related     RECOMMEND:  -emergent dialysis with UF 2.5 -3 liters   -renal diet   -resume home BP meds .  -retacrit 10,000 with each dialysis.    d-w dialysis  unit.  Thank you for involving [facility] in this patient's care.    With warm regards    Maggyharpreet M[facility]  Office: [phone]

## 2023-09-11 NOTE — H&P ADULT - HISTORY OF PRESENT ILLNESS
Please note patient is a limited historian with regards to her recent hospitalization at [facility]. Attempted to reach family at [phone] but went to , left  for call back.    This is a 63F with history of Polycystic kidney disease c/b ESRD on HD (via R UE AVF), hx Renal tx 1990, Lymphoma in remission, Anemia with history of blood transfusions, and CVA x2 (with R eye hemianopsia and R facial droop) who presents to [facility] with complaints of SOB and chest pain. Patient was recently hospitalized at [facility] for approximately 2 weeks and eventually left AMA on Sunday because she was frustrated with her care there. Patient said that she was initially in the hospital for a cardiac catheterization (pt unclear on indication and any interventions that were done) with procedure c/b CVA (unclear if she had the CVA during the procedure or after) and prolonged stay. Patient unclear on procedures and interventions done during stay but did state that she had 1-2 pRBC transfusions while there for anemia, states that she had an endoscopy (unclear if only EGD or also with colonoscopy, unknown results) and a capsule endoscopy which she said showed she had dried blood in her intestine. Patient also had imaging with [facility] with IV con (8/26/23) which showed her to have acute vs subacute L temporo-occipital infarct new from prior. She had a subsequent MR Emmanuel barton (8/27/23) that showed acute to subacute large left PCA territory infarct and small acute to subacute left thalamus and basal ganglia infarcts. MR Angio of the head and neck (8/27/23) occlusion of the left PCA at the P2 level, multifocal severe right PCA stenosis, severe stenosis of the proximal right superior cerebellar artery, multifocal moderate to severe stenosis of the right indradural vertebral artery with chronic occlusion distally, and grossly patent cervical arteries without focal occlusion or significant stenosis. Patient states that she was requesting that they OSH limit her blood draws due to her fear of needles but they did not seem to limit them and she therefore got frustrated with her stay and left AMA on 9/10. States that she was unable to get her usual HD session re-initiated before leaving the hospital and was scheduled for a tuesday session. On Monday (1 day prior to admission) patient started to get SOB with chest pain. Said that the chest pain was mild, L sided, nonradiating, and severe in quality. Denied palpitations or syncope with her symptoms. Denied cough/sputum/hemoptysis. Denied fevers/chills. She went to an [facility] due to her SOB and chest pain, there she was noted to be hypoxic to 83% on RA and was therefore sent to the ED for further evaluation. Currently, patient in HD, reports improvement in her SOB with HD though still not fully gone, states her chest pain has resolved. Patient also reports feeling depressed due to her recent hospitalization and frequent blood draws. Denies SI/HI, denies AVH. Denies other acute complaints.     On arrival to [facility], her vitals were T 97.9, P 87, /66, RR 15, O2 sat 97% NC. Her lab work showed anemia (within recent levels), hyperkalemia, elevated troponins (though insignificant change given ESRD), and elevated proBNP. Her lactate was wnl. Her CXR showed findings concerning for fluid overload with b/l pleural effusions and R sided opacities. She was evaluated by nephrology and taken for HD overnight.

## 2023-09-11 NOTE — H&P ADULT - NSHPSOCIALHISTORY_GEN_ALL_CORE
Lives with family  Ambulates with wheelchair/walker  Denies tobacco, EtOH, or illicit substance use Valtrex Counseling: I discussed with the patient the risks of valacyclovir including but not limited to kidney damage, nausea, vomiting and severe allergy.  The patient understands that if the infection seems to be worsening or is not improving, they are to call.

## 2023-09-11 NOTE — H&P ADULT - PROBLEM SELECTOR PLAN 7
- Medications reconciled as per recent discharge notes from [facility] pharmacist emailed for medication reconciliation

## 2023-09-11 NOTE — ED PROVIDER NOTE - OBJECTIVE STATEMENT
63-year-old female with a history of anemia requiring blood transfusions, ESRD on HD Monday Wednesday Friday, with recent hospitalization for low hemoglobin presenting due to concern of shortness of breath.  Patient states that she is supposed to have dialysis today but because she did not want to stay in the hospital she missed her outpatient appointment.  She left the hospital because she did not like the care she was receiving there.  Patient follows with [facility] for nephrology Dr. Christoph Spear.  No fever, chills, nausea, vomiting.  Patient arrives with EMS on a nonrebreather, in the field her O2 saturation on room air was 83%. She is 100% on NRB on arrival. Patient does not wear oxygen at baseline.

## 2023-09-11 NOTE — ED ADULT NURSE REASSESSMENT NOTE - NS ED NURSE REASSESS COMMENT FT1
Report given to dialysis RN and RN for T824A. pt will go to dialysis then T824A. Pt in no apparent distress. remains on 2L O2

## 2023-09-11 NOTE — ED ADULT NURSE NOTE - OBJECTIVE STATEMENT
62yo female received in trauma room A from facilitator RN.. hx of anemia, ESRD with dialysis M W F, did not get dialysis today because she AMA from another hospital, present to the ER for shortness of breath, pt on 2L of O2, sating %. NSR on monitor. Hypertensive. 20G IV in LAC, AV fistula noted to right arm. Awaiting for bed assignment and dialysis.

## 2023-09-11 NOTE — ED ADULT NURSE NOTE - NSFALLHARMRISKINTERV_ED_ALL_ED
Assistance OOB with selected safe patient handling equipment if applicable/Communicate risk of Fall with Harm to all staff, patient, and family/Provide visual cue: red socks, yellow wristband, yellow gown, etc/Reinforce activity limits and safety measures with patient and family/Bed in lowest position, wheels locked, appropriate side rails in place/Call bell, personal items and telephone in reach/Instruct patient to call for assistance before getting out of bed/chair/stretcher/Non-slip footwear applied when patient is off stretcher/[place] to call system/Physically safe environment - no spills, clutter or unnecessary equipment/Purposeful Proactive Rounding/Room/bathroom lighting operational, light cord in reach

## 2023-09-11 NOTE — H&P ADULT - PROBLEM SELECTOR PLAN 6
- Renal evaluation appreciated  - Continue renal medications  - Fluid removal as per renal  - Monitor potassium

## 2023-09-11 NOTE — H&P ADULT - PROBLEM SELECTOR PLAN 1
- Patient with acute hypoxic respiratory failure likely in setting of fluid overload due to missed HD session though given recent OSH admission and hypoxia on RA concern for possible PE  - c/w supplemental O2, downtitrate as needed  - Check VQ scan for PE eval, has some trace edema mostly to her feet but would check US duplex for VTE r/o also  - CXR prelim with some asymmetric R sided opacities, final read says perihilar haze more on the R than L with effusions consistent with CHF -> checked procalcitonin due to the asymmetric opacities, returned slightly elevated at 0.42 but patient denies cough/sputum, denies fevers/chills/diaphoresis, afebrile here so far, no leukocytosis -> would monitor off abx, check CT Chest for further evaluation  - b/l pleural effusions more likely 2/2 fluid overload, f/u CT Chest for further eval, consider pulm eval in AM (as per primary team and nephrology)

## 2023-09-11 NOTE — H&P ADULT - PROBLEM SELECTOR PLAN 2
- Elevated proBNP with CXR concerning for CHF, patient reports having had a cardiac angiogram prior to her recent admission to [facility], unclear if she has fluid overload 2/2 missed HD session vs cardiac issues  - Trops elevated but stable given ESRD, CK and CKMB without acute findings, EKG non-ischemic  - Check TTE  - Monitor on telemetry  - Cards eval in AM (as per primary team)  - See if we can obtain records of her cardiac angiogram from [facility] and clarify if any interventions were done - Elevated proBNP with CXR concerning for CHF, patient reports having had a cardiac angiogram prior to her recent admission to [facility], unclear if she has fluid overload 2/2 missed HD session vs cardiac issues  - Trops elevated but stable given ESRD, CK and CKMB without acute findings, EKG non-ischemic  - Check TTE (has ALEX on cardiac auscultation)  - Monitor on telemetry  - Cards eval in AM (as per primary team)  - See if we can obtain records of her cardiac angiogram from [facility] and clarify if any interventions were done

## 2023-09-11 NOTE — H&P ADULT - ASSESSMENT
This is a 63F with history as above who presents to the hospital with SOB and chest pain found to have acute hypoxic respiratory failure.

## 2023-09-11 NOTE — H&P ADULT - PROBLEM SELECTOR PLAN 8
DVT ppx - Likely SCDs pending US DVT study  Diet - Minced and moist DASH/renal diet, S&S eval  Activity - OOB with assistance, increase as tolerated, PT/OT eval    Fall and aspiration precautions

## 2023-09-11 NOTE — H&P ADULT - NSHPREVIEWOFSYSTEMS_GEN_ALL_CORE
REVIEW OF SYSTEMS:    CONSTITUTIONAL: No weakness, fevers or chills  EYES: +R eye hemianopsia (since her recent CVA in August 2023 as per patient), No diplopia or eye discharge  ENT: No rhinorrhea or sore throat  NECK: No pain or stiffness  RESPIRATORY: No cough, wheezing, hemoptysis; +shortness of breath  CARDIOVASCULAR: +chest pain (now resolved), No palpitations; No lower extremity edema  GASTROINTESTINAL: No abdominal or epigastric pain. No nausea, vomiting, or hematemesis; No diarrhea or constipation. No melena or hematochezia.  BACK: +diffuse back pain without fall/injury, denies saddle anesthesia, denies fecal/urinary incontinence/retention   GENITOURINARY: No dysuria, frequency or hematuria  NEUROLOGICAL: +R facial droop (since recent CVA in August 2023 as per patient), No new focal numbness or weakness  SKIN: No itching, burning, rashes, or lesions

## 2023-09-11 NOTE — H&P ADULT - NSHPPHYSICALEXAM_GEN_ALL_CORE
Vital Signs Last 24 Hrs  T(C): 36.9 (12 Sep 2023 04:00), Max: 36.9 (12 Sep 2023 04:00)  T(F): 98.5 (12 Sep 2023 04:00), Max: 98.5 (12 Sep 2023 04:00)  HR: 80 (12 Sep 2023 06:37) (69 - 87)  BP: 156/54 (12 Sep 2023 06:37) (156/54 - 202/80)  BP(mean): --  RR: 16 (12 Sep 2023 04:00) (15 - 18)  SpO2: 100% (12 Sep 2023 04:00) (97% - 100%)    Parameters below as of 12 Sep 2023 04:00  Patient On (Oxygen Delivery Method): nasal cannula  O2 Flow (L/min): 1    GENERAL: No acute distress, well-developed  EYES: EOMI, PERRL, conjunctiva and sclera clear  ENT: Neck supple, + JVD, moist mucosa  CHEST/LUNG: +Diffuse crackles throughout, no wheezing  BACK: No spinal tenderness, no CVA TTP  HEART: Regular rate and rhythm; No murmurs, rubs, or gallops  ABDOMEN: Soft, Nontender, Nondistended; Bowel sounds present  EXTREMITIES: +DP/PT/Radial pulses, +mild pitting edema mostly around her ankles, + R UE AVF with palpable thrill  PSYCH: Nl behavior, nl affect  NEUROLOGY: AAOx3, speech slightly halting but patient states this is not unusual for her, mild R facial asymmetry, tongue midline, V1-V3 SILT b/l, EOMI/PERRL but R sided hemianopsia noted on visual field testing, shoulder shrug intact b/l; SILT b/l UE and LE; b/l UE 4+/5 strength (seems effort limited), b/l LE 5/5 strength  SKIN: Normal color, No rashes or lesions Vital Signs Last 24 Hrs  T(C): 36.9 (12 Sep 2023 04:00), Max: 36.9 (12 Sep 2023 04:00)  T(F): 98.5 (12 Sep 2023 04:00), Max: 98.5 (12 Sep 2023 04:00)  HR: 80 (12 Sep 2023 06:37) (69 - 87)  BP: 156/54 (12 Sep 2023 06:37) (156/54 - 202/80)  BP(mean): --  RR: 16 (12 Sep 2023 04:00) (15 - 18)  SpO2: 100% (12 Sep 2023 04:00) (97% - 100%)    Parameters below as of 12 Sep 2023 04:00  Patient On (Oxygen Delivery Method): nasal cannula  O2 Flow (L/min): 1    GENERAL: No acute distress, well-developed  EYES: EOMI, PERRL, conjunctiva and sclera clear  ENT: Neck supple, + JVD, moist mucosa  CHEST/LUNG: +Diffuse crackles throughout, no wheezing  BACK: No spinal tenderness, no CVA TTP  HEART: Regular rate and rhythm; +ALEX  ABDOMEN: Soft, Nontender, Nondistended; Bowel sounds present  EXTREMITIES: +DP/PT/Radial pulses, +mild pitting edema mostly around her ankles, + R UE AVF with palpable thrill  PSYCH: Nl behavior, nl affect  NEUROLOGY: AAOx3, speech slightly halting but patient states this is not unusual for her, mild R facial asymmetry, tongue midline, V1-V3 SILT b/l, EOMI/PERRL but R sided hemianopsia noted on visual field testing, shoulder shrug intact b/l; SILT b/l UE and LE; b/l UE 4+/5 strength (seems effort limited), b/l LE 5/5 strength  SKIN: Normal color, No rashes or lesions

## 2023-09-11 NOTE — H&P ADULT - NSHPLABSRESULTS_GEN_ALL_CORE
LABS and ADDITIONAL STUDIES:                        9.5    4.12  )-----------( 181      ( 11 Sep 2023 20:45 )             28.8   Comprehensive Metabolic Panel (09.11.23 @ 20:45)   Sodium: 130 mmol/L  Potassium: TNP: SPECIMEN GROSSLY HEMOLYZED mmol/L  Chloride: 91 mmol/L  Carbon Dioxide: 21 mmol/L  Anion Gap: 18 mmol/L  Blood Urea Nitrogen: 79 mg/dL  Creatinine: 8.54 mg/dL  Glucose: 96 mg/dL  Calcium: 9.9 mg/dL  Protein Total: TNP: SPECIMEN GROSSLY HEMOLYZED g/dL  Albumin: 4.2 g/dL  Bilirubin Total: 0.4 mg/dL  Alkaline Phosphatase: 161 U/L  Aspartate Aminotransferase (AST/SGOT): TNP: SPECIMEN GROSSLY HEMOLYZED U/L  Alanine Aminotransferase (ALT/SGPT): TNP: SPECIMEN GROSSLY HEMOLYZED U/L  eGFR: 5  mL/min/1.73m2    x   |  x   |  x   ----------------------------<  x      09-11  6.8<HH>   |  x   |  x     Ca    9.9      11 Sep 2023 20:45  Mg     2.30     09-11    TPro  TNP  /  Alb  4.2  /  TBili  0.4  /  DBili  x   /  AST  TNP  /  ALT  TNP  /  AlkPhos  161<H>  09-11    PT/INR: 11.1/0.98 (09-11-23 @ 20:45)  PTT: 32.2 (09-11-23 @ 20:45)    20:45 - VBG - pH: 7.40  | pCO2: 39    | pO2: 32    | Lactate: 1.1      hs Troponin, T - 94 ng/L (09-11-23 @ 22:00)  hs Troponin, T - 88 ng/L (09-11-23 @ 20:45)    Pro-BNP: 08967 (09-11-23 @ 20:45)    < from: Xray Chest 1 View AP/PA (09.11.23 @ 20:59) >  ******PRELIMINARY REPORT******    FINDINGS:  Support devices: Left subclavian vascular stent.  The cardiomediastinal silhouette is  not accurately assessed in this AP projection.  Small bilateral pleural effusions associated compressive atelectasis.   Additional right mid/upper lung field airspace opacities.  No pneumothorax.  No acute bony abnormality.    IMPRESSION:  Small bilateral pleural effusions and right mid/upper lung field airspace opacities.  < end of copied text >    EKG - LABS and ADDITIONAL STUDIES:                        9.5    4.12  )-----------( 181      ( 11 Sep 2023 20:45 )             28.8   Comprehensive Metabolic Panel (09.11.23 @ 20:45)   Sodium: 130 mmol/L  Potassium: TNP: SPECIMEN GROSSLY HEMOLYZED mmol/L  Chloride: 91 mmol/L  Carbon Dioxide: 21 mmol/L  Anion Gap: 18 mmol/L  Blood Urea Nitrogen: 79 mg/dL  Creatinine: 8.54 mg/dL  Glucose: 96 mg/dL  Calcium: 9.9 mg/dL  Protein Total: TNP: SPECIMEN GROSSLY HEMOLYZED g/dL  Albumin: 4.2 g/dL  Bilirubin Total: 0.4 mg/dL  Alkaline Phosphatase: 161 U/L  Aspartate Aminotransferase (AST/SGOT): TNP: SPECIMEN GROSSLY HEMOLYZED U/L  Alanine Aminotransferase (ALT/SGPT): TNP: SPECIMEN GROSSLY HEMOLYZED U/L  eGFR: 5  mL/min/1.73m2    x   |  x   |  x   ----------------------------<  x      09-11  6.8<HH>   |  x   |  x     Ca    9.9      11 Sep 2023 20:45  Mg     2.30     09-11    TPro  TNP  /  Alb  4.2  /  TBili  0.4  /  DBili  x   /  AST  TNP  /  ALT  TNP  /  AlkPhos  161<H>  09-11    PT/INR: 11.1/0.98 (09-11-23 @ 20:45)  PTT: 32.2 (09-11-23 @ 20:45)    20:45 - VBG - pH: 7.40  | pCO2: 39    | pO2: 32    | Lactate: 1.1      hs Troponin, T - 94 ng/L (09-11-23 @ 22:00)  hs Troponin, T - 88 ng/L (09-11-23 @ 20:45)    Pro-BNP: 96949 (09-11-23 @ 20:45)    < from: Xray Chest 1 View AP/PA (09.11.23 @ 20:59) >  ******PRELIMINARY REPORT******    FINDINGS:  Support devices: Left subclavian vascular stent.  The cardiomediastinal silhouette is  not accurately assessed in this AP projection.  Small bilateral pleural effusions associated compressive atelectasis.   Additional right mid/upper lung field airspace opacities.  No pneumothorax.  No acute bony abnormality.    IMPRESSION:  Small bilateral pleural effusions and right mid/upper lung field airspace opacities.  < end of copied text >    EKG - NSR at 75, QTc 455, no significant ST-T wave changes

## 2023-09-12 DIAGNOSIS — J96.01 ACUTE RESPIRATORY FAILURE WITH HYPOXIA: ICD-10-CM

## 2023-09-12 DIAGNOSIS — D64.9 ANEMIA, UNSPECIFIED: ICD-10-CM

## 2023-09-12 DIAGNOSIS — I63.9 CEREBRAL INFARCTION, UNSPECIFIED: ICD-10-CM

## 2023-09-12 DIAGNOSIS — N18.6 END STAGE RENAL DISEASE: ICD-10-CM

## 2023-09-12 DIAGNOSIS — R09.89 OTHER SPECIFIED SYMPTOMS AND SIGNS INVOLVING THE CIRCULATORY AND RESPIRATORY SYSTEMS: ICD-10-CM

## 2023-09-12 DIAGNOSIS — Z29.9 ENCOUNTER FOR PROPHYLACTIC MEASURES, UNSPECIFIED: ICD-10-CM

## 2023-09-12 DIAGNOSIS — R07.9 CHEST PAIN, UNSPECIFIED: ICD-10-CM

## 2023-09-12 DIAGNOSIS — I50.9 HEART FAILURE, UNSPECIFIED: ICD-10-CM

## 2023-09-12 DIAGNOSIS — Z79.899 OTHER LONG TERM (CURRENT) DRUG THERAPY: ICD-10-CM

## 2023-09-12 LAB
A1C WITH ESTIMATED AVERAGE GLUCOSE RESULT: 5 % — SIGNIFICANT CHANGE UP (ref 4–5.6)
ALBUMIN SERPL ELPH-MCNC: 3.9 G/DL — SIGNIFICANT CHANGE UP (ref 3.3–5)
ALP SERPL-CCNC: 149 U/L — HIGH (ref 40–120)
ALT FLD-CCNC: 22 U/L — SIGNIFICANT CHANGE UP (ref 4–33)
ANION GAP SERPL CALC-SCNC: 18 MMOL/L — HIGH (ref 7–14)
AST SERPL-CCNC: 68 U/L — HIGH (ref 4–32)
BASOPHILS # BLD AUTO: 0.02 K/UL — SIGNIFICANT CHANGE UP (ref 0–0.2)
BASOPHILS NFR BLD AUTO: 0.6 % — SIGNIFICANT CHANGE UP (ref 0–2)
BILIRUB SERPL-MCNC: 0.6 MG/DL — SIGNIFICANT CHANGE UP (ref 0.2–1.2)
BLOOD GAS VENOUS COMPREHENSIVE RESULT: SIGNIFICANT CHANGE UP
BUN SERPL-MCNC: 30 MG/DL — HIGH (ref 7–23)
CALCIUM SERPL-MCNC: 9.8 MG/DL — SIGNIFICANT CHANGE UP (ref 8.4–10.5)
CHLORIDE SERPL-SCNC: 93 MMOL/L — LOW (ref 98–107)
CHOLEST SERPL-MCNC: 194 MG/DL — SIGNIFICANT CHANGE UP
CK MB BLD-MCNC: 1.1 % — SIGNIFICANT CHANGE UP (ref 0–2.5)
CK MB CFR SERPL CALC: 1.8 NG/ML — SIGNIFICANT CHANGE UP
CK SERPL-CCNC: 160 U/L — SIGNIFICANT CHANGE UP (ref 25–170)
CO2 SERPL-SCNC: 25 MMOL/L — SIGNIFICANT CHANGE UP (ref 22–31)
CREAT SERPL-MCNC: 4.4 MG/DL — HIGH (ref 0.5–1.3)
DIALYSIS INSTRUMENT RESULT - HEPATITIS B SURFACE ANTIGEN: NEGATIVE — SIGNIFICANT CHANGE UP
EGFR: 11 ML/MIN/1.73M2 — LOW
EOSINOPHIL # BLD AUTO: 0.14 K/UL — SIGNIFICANT CHANGE UP (ref 0–0.5)
EOSINOPHIL NFR BLD AUTO: 4.2 % — SIGNIFICANT CHANGE UP (ref 0–6)
ESTIMATED AVERAGE GLUCOSE: 97 — SIGNIFICANT CHANGE UP
GLUCOSE BLDC GLUCOMTR-MCNC: 137 MG/DL — HIGH (ref 70–99)
GLUCOSE BLDC GLUCOMTR-MCNC: 90 MG/DL — SIGNIFICANT CHANGE UP (ref 70–99)
GLUCOSE BLDC GLUCOMTR-MCNC: 97 MG/DL — SIGNIFICANT CHANGE UP (ref 70–99)
GLUCOSE SERPL-MCNC: 76 MG/DL — SIGNIFICANT CHANGE UP (ref 70–99)
HBV CORE AB SER-ACNC: SIGNIFICANT CHANGE UP
HBV SURFACE AB SER-ACNC: 332.8 MIU/ML — SIGNIFICANT CHANGE UP
HBV SURFACE AG SER-ACNC: SIGNIFICANT CHANGE UP
HCT VFR BLD CALC: 27.1 % — LOW (ref 34.5–45)
HCV AB S/CO SERPL IA: 0.06 S/CO — SIGNIFICANT CHANGE UP (ref 0–0.99)
HCV AB SERPL-IMP: SIGNIFICANT CHANGE UP
HDLC SERPL-MCNC: 52 MG/DL — SIGNIFICANT CHANGE UP
HGB BLD-MCNC: 8.9 G/DL — LOW (ref 11.5–15.5)
IANC: 2.21 K/UL — SIGNIFICANT CHANGE UP (ref 1.8–7.4)
IMM GRANULOCYTES NFR BLD AUTO: 0.6 % — SIGNIFICANT CHANGE UP (ref 0–0.9)
LIPID PNL WITH DIRECT LDL SERPL: 112 MG/DL — HIGH
LYMPHOCYTES # BLD AUTO: 0.58 K/UL — LOW (ref 1–3.3)
LYMPHOCYTES # BLD AUTO: 17.5 % — SIGNIFICANT CHANGE UP (ref 13–44)
MAGNESIUM SERPL-MCNC: 2.2 MG/DL — SIGNIFICANT CHANGE UP (ref 1.6–2.6)
MCHC RBC-ENTMCNC: 29.6 PG — SIGNIFICANT CHANGE UP (ref 27–34)
MCHC RBC-ENTMCNC: 32.8 GM/DL — SIGNIFICANT CHANGE UP (ref 32–36)
MCV RBC AUTO: 90 FL — SIGNIFICANT CHANGE UP (ref 80–100)
MONOCYTES # BLD AUTO: 0.35 K/UL — SIGNIFICANT CHANGE UP (ref 0–0.9)
MONOCYTES NFR BLD AUTO: 10.5 % — SIGNIFICANT CHANGE UP (ref 2–14)
MRSA PCR RESULT.: SIGNIFICANT CHANGE UP
NEUTROPHILS # BLD AUTO: 2.21 K/UL — SIGNIFICANT CHANGE UP (ref 1.8–7.4)
NEUTROPHILS NFR BLD AUTO: 66.6 % — SIGNIFICANT CHANGE UP (ref 43–77)
NON HDL CHOLESTEROL: 142 MG/DL — HIGH
NRBC # BLD: 0 /100 WBCS — SIGNIFICANT CHANGE UP (ref 0–0)
NRBC # FLD: 0 K/UL — SIGNIFICANT CHANGE UP (ref 0–0)
PHOSPHATE SERPL-MCNC: 3.1 MG/DL — SIGNIFICANT CHANGE UP (ref 2.5–4.5)
PLATELET # BLD AUTO: 220 K/UL — SIGNIFICANT CHANGE UP (ref 150–400)
POTASSIUM SERPL-MCNC: 5.4 MMOL/L — HIGH (ref 3.5–5.3)
POTASSIUM SERPL-SCNC: 5.4 MMOL/L — HIGH (ref 3.5–5.3)
PROCALCITONIN SERPL-MCNC: 0.42 NG/ML — HIGH (ref 0.02–0.1)
PROCALCITONIN SERPL-MCNC: 0.5 NG/ML — HIGH (ref 0.02–0.1)
PROT SERPL-MCNC: 6.5 G/DL — SIGNIFICANT CHANGE UP (ref 6–8.3)
RBC # BLD: 3.01 M/UL — LOW (ref 3.8–5.2)
RBC # FLD: 15.4 % — HIGH (ref 10.3–14.5)
S AUREUS DNA NOSE QL NAA+PROBE: DETECTED
SODIUM SERPL-SCNC: 136 MMOL/L — SIGNIFICANT CHANGE UP (ref 135–145)
TRIGL SERPL-MCNC: 152 MG/DL — HIGH
TROPONIN T, HIGH SENSITIVITY RESULT: 86 NG/L — CRITICAL HIGH
TSH SERPL-MCNC: 3.38 UIU/ML — SIGNIFICANT CHANGE UP (ref 0.27–4.2)
WBC # BLD: 3.32 K/UL — LOW (ref 3.8–10.5)
WBC # FLD AUTO: 3.32 K/UL — LOW (ref 3.8–10.5)

## 2023-09-12 PROCEDURE — 71250 CT THORAX DX C-: CPT | Mod: 26

## 2023-09-12 PROCEDURE — 70450 CT HEAD/BRAIN W/O DYE: CPT | Mod: 26

## 2023-09-12 PROCEDURE — 99223 1ST HOSP IP/OBS HIGH 75: CPT | Mod: FS

## 2023-09-12 PROCEDURE — 99233 SBSQ HOSP IP/OBS HIGH 50: CPT

## 2023-09-12 PROCEDURE — 93306 TTE W/DOPPLER COMPLETE: CPT | Mod: 26

## 2023-09-12 RX ORDER — ESCITALOPRAM OXALATE 10 MG/1
10 TABLET, FILM COATED ORAL DAILY
Refills: 0 | Status: DISCONTINUED | OUTPATIENT
Start: 2023-09-12 | End: 2023-09-15

## 2023-09-12 RX ORDER — LOSARTAN POTASSIUM 100 MG/1
100 TABLET, FILM COATED ORAL DAILY
Refills: 0 | Status: DISCONTINUED | OUTPATIENT
Start: 2023-09-12 | End: 2023-09-15

## 2023-09-12 RX ORDER — LANOLIN ALCOHOL/MO/W.PET/CERES
3 CREAM (GRAM) TOPICAL AT BEDTIME
Refills: 0 | Status: DISCONTINUED | OUTPATIENT
Start: 2023-09-12 | End: 2023-09-15

## 2023-09-12 RX ORDER — ATORVASTATIN CALCIUM 80 MG/1
1 TABLET, FILM COATED ORAL
Refills: 0 | DISCHARGE

## 2023-09-12 RX ORDER — ONDANSETRON 8 MG/1
4 TABLET, FILM COATED ORAL EVERY 8 HOURS
Refills: 0 | Status: DISCONTINUED | OUTPATIENT
Start: 2023-09-12 | End: 2023-09-15

## 2023-09-12 RX ORDER — METOPROLOL TARTRATE 50 MG
50 TABLET ORAL DAILY
Refills: 0 | Status: DISCONTINUED | OUTPATIENT
Start: 2023-09-12 | End: 2023-09-15

## 2023-09-12 RX ORDER — AMLODIPINE BESYLATE 2.5 MG/1
10 TABLET ORAL DAILY
Refills: 0 | Status: DISCONTINUED | OUTPATIENT
Start: 2023-09-12 | End: 2023-09-15

## 2023-09-12 RX ORDER — ISOSORBIDE MONONITRATE 60 MG/1
30 TABLET, EXTENDED RELEASE ORAL DAILY
Refills: 0 | Status: DISCONTINUED | OUTPATIENT
Start: 2023-09-12 | End: 2023-09-15

## 2023-09-12 RX ORDER — CHLORHEXIDINE GLUCONATE 213 G/1000ML
1 SOLUTION TOPICAL DAILY
Refills: 0 | Status: DISCONTINUED | OUTPATIENT
Start: 2023-09-12 | End: 2023-09-15

## 2023-09-12 RX ORDER — SODIUM ZIRCONIUM CYCLOSILICATE 10 G/10G
5 POWDER, FOR SUSPENSION ORAL ONCE
Refills: 0 | Status: COMPLETED | OUTPATIENT
Start: 2023-09-12 | End: 2023-09-12

## 2023-09-12 RX ORDER — ASPIRIN/CALCIUM CARB/MAGNESIUM 324 MG
81 TABLET ORAL DAILY
Refills: 0 | Status: DISCONTINUED | OUTPATIENT
Start: 2023-09-12 | End: 2023-09-12

## 2023-09-12 RX ORDER — INFLUENZA VIRUS VACCINE 15; 15; 15; 15 UG/.5ML; UG/.5ML; UG/.5ML; UG/.5ML
0.5 SUSPENSION INTRAMUSCULAR ONCE
Refills: 0 | Status: DISCONTINUED | OUTPATIENT
Start: 2023-09-12 | End: 2023-09-15

## 2023-09-12 RX ORDER — ERYTHROPOIETIN 10000 [IU]/ML
10000 INJECTION, SOLUTION INTRAVENOUS; SUBCUTANEOUS ONCE
Refills: 0 | Status: COMPLETED | OUTPATIENT
Start: 2023-09-12 | End: 2023-09-12

## 2023-09-12 RX ORDER — ATORVASTATIN CALCIUM 80 MG/1
80 TABLET, FILM COATED ORAL AT BEDTIME
Refills: 0 | Status: DISCONTINUED | OUTPATIENT
Start: 2023-09-12 | End: 2023-09-15

## 2023-09-12 RX ORDER — PANTOPRAZOLE SODIUM 20 MG/1
40 TABLET, DELAYED RELEASE ORAL
Refills: 0 | Status: DISCONTINUED | OUTPATIENT
Start: 2023-09-12 | End: 2023-09-15

## 2023-09-12 RX ORDER — ACETAMINOPHEN 500 MG
650 TABLET ORAL EVERY 6 HOURS
Refills: 0 | Status: DISCONTINUED | OUTPATIENT
Start: 2023-09-12 | End: 2023-09-15

## 2023-09-12 RX ORDER — METOPROLOL TARTRATE 50 MG
25 TABLET ORAL AT BEDTIME
Refills: 0 | Status: DISCONTINUED | OUTPATIENT
Start: 2023-09-12 | End: 2023-09-15

## 2023-09-12 RX ORDER — HYDRALAZINE HCL 50 MG
100 TABLET ORAL EVERY 8 HOURS
Refills: 0 | Status: DISCONTINUED | OUTPATIENT
Start: 2023-09-12 | End: 2023-09-15

## 2023-09-12 RX ORDER — OXCARBAZEPINE 300 MG/1
150 TABLET, FILM COATED ORAL
Refills: 0 | Status: DISCONTINUED | OUTPATIENT
Start: 2023-09-12 | End: 2023-09-15

## 2023-09-12 RX ORDER — MUPIROCIN 20 MG/G
1 OINTMENT TOPICAL
Refills: 0 | Status: DISCONTINUED | OUTPATIENT
Start: 2023-09-12 | End: 2023-09-15

## 2023-09-12 RX ORDER — FOLIC ACID 0.8 MG
1 TABLET ORAL DAILY
Refills: 0 | Status: DISCONTINUED | OUTPATIENT
Start: 2023-09-12 | End: 2023-09-15

## 2023-09-12 RX ORDER — TICAGRELOR 90 MG/1
90 TABLET ORAL EVERY 12 HOURS
Refills: 0 | Status: DISCONTINUED | OUTPATIENT
Start: 2023-09-12 | End: 2023-09-12

## 2023-09-12 RX ORDER — CALCIUM ACETATE 667 MG
2001 TABLET ORAL
Refills: 0 | Status: DISCONTINUED | OUTPATIENT
Start: 2023-09-12 | End: 2023-09-15

## 2023-09-12 RX ORDER — OXCARBAZEPINE 300 MG/1
300 TABLET, FILM COATED ORAL
Refills: 0 | Status: DISCONTINUED | OUTPATIENT
Start: 2023-09-12 | End: 2023-09-15

## 2023-09-12 RX ADMIN — ERYTHROPOIETIN 10000 UNIT(S): 10000 INJECTION, SOLUTION INTRAVENOUS; SUBCUTANEOUS at 02:03

## 2023-09-12 RX ADMIN — Medication 100 MILLIGRAM(S): at 22:40

## 2023-09-12 RX ADMIN — Medication 2001 MILLIGRAM(S): at 08:53

## 2023-09-12 RX ADMIN — PANTOPRAZOLE SODIUM 40 MILLIGRAM(S): 20 TABLET, DELAYED RELEASE ORAL at 18:23

## 2023-09-12 RX ADMIN — AMLODIPINE BESYLATE 10 MILLIGRAM(S): 2.5 TABLET ORAL at 08:53

## 2023-09-12 RX ADMIN — Medication 25 MILLIGRAM(S): at 22:40

## 2023-09-12 RX ADMIN — Medication 1 MILLIGRAM(S): at 12:32

## 2023-09-12 RX ADMIN — TICAGRELOR 90 MILLIGRAM(S): 90 TABLET ORAL at 18:22

## 2023-09-12 RX ADMIN — ISOSORBIDE MONONITRATE 30 MILLIGRAM(S): 60 TABLET, EXTENDED RELEASE ORAL at 12:31

## 2023-09-12 RX ADMIN — Medication 2001 MILLIGRAM(S): at 18:22

## 2023-09-12 RX ADMIN — Medication 2001 MILLIGRAM(S): at 12:32

## 2023-09-12 RX ADMIN — SODIUM ZIRCONIUM CYCLOSILICATE 5 GRAM(S): 10 POWDER, FOR SUSPENSION ORAL at 12:36

## 2023-09-12 RX ADMIN — ESCITALOPRAM OXALATE 10 MILLIGRAM(S): 10 TABLET, FILM COATED ORAL at 12:34

## 2023-09-12 RX ADMIN — ATORVASTATIN CALCIUM 80 MILLIGRAM(S): 80 TABLET, FILM COATED ORAL at 22:40

## 2023-09-12 RX ADMIN — LOSARTAN POTASSIUM 100 MILLIGRAM(S): 100 TABLET, FILM COATED ORAL at 06:35

## 2023-09-12 RX ADMIN — CHLORHEXIDINE GLUCONATE 1 APPLICATION(S): 213 SOLUTION TOPICAL at 12:36

## 2023-09-12 RX ADMIN — Medication 81 MILLIGRAM(S): at 12:31

## 2023-09-12 RX ADMIN — OXCARBAZEPINE 300 MILLIGRAM(S): 300 TABLET, FILM COATED ORAL at 09:10

## 2023-09-12 RX ADMIN — MUPIROCIN 1 APPLICATION(S): 20 OINTMENT TOPICAL at 18:24

## 2023-09-12 RX ADMIN — Medication 50 MILLIGRAM(S): at 08:55

## 2023-09-12 RX ADMIN — Medication 1 TABLET(S): at 13:51

## 2023-09-12 RX ADMIN — Medication 100 MILLIGRAM(S): at 12:31

## 2023-09-12 RX ADMIN — OXCARBAZEPINE 150 MILLIGRAM(S): 300 TABLET, FILM COATED ORAL at 22:40

## 2023-09-12 NOTE — DIETITIAN INITIAL EVALUATION ADULT - PERTINENT LABORATORY DATA
09-12    136  |  93<L>  |  30<H>  ----------------------------<  76  5.4<H>   |  25  |  4.40<H>    Ca    9.8      12 Sep 2023 07:37  Phos  3.1     09-12  Mg     2.20     09-12    TPro  6.5  /  Alb  3.9  /  TBili  0.6  /  DBili  x   /  AST  68<H>  /  ALT  22  /  AlkPhos  149<H>  09-12  POCT Blood Glucose.: 97 mg/dL (09-12-23 @ 11:56)  A1C with Estimated Average Glucose Result: 5.0 % (09-12-23 @ 07:37)

## 2023-09-12 NOTE — CHART NOTE - NSCHARTNOTEFT_GEN_A_CORE
notified by primary team ACP that CTH shows hemorrhage in L occipital region. CTH reviewed, hemorrhage appears punctate. Advised provider to hold anti-platelets (aspirin and Brilinta) and any anti-coagulation. BP goal up to 140-160/90. Would also obtain repeat CTH in 24h to assess for stability. Case discussed w/ stroke fellow Dr. Godfrey, who is in agreement w/ plan. Will continue to follow. Call [phone] with any questions

## 2023-09-12 NOTE — CONSULT NOTE ADULT - SUBJECTIVE AND OBJECTIVE BOX
63F PMHx Polycystic kidney disease c/b ESRD on HD (via R UE AVF), hx Renal tx 1990, Lymphoma in remission, Anemia with history of blood transfusions, and     CVA x2 (with R eye hemianopsia and R facial droop)???     who presents to [facility] with complaints of SOB and chest pain. Patient was recently hospitalized at [facility] for approximately 2 weeks and eventually left [facility] on Sunday because she was frustrated with her care there.     Patient said that she was initially in the hospital for a cardiac catheterization (pt unclear on indication and any interventions that were done) with procedure c/b CVA (unclear if she had the CVA during the procedure or after) and prolonged stay.     Patient unclear on procedures and interventions done during stay but did state that she had 1-2 pRBC transfusions while there for anemia, states that she had an endoscopy (unclear if only EGD or also with colonoscopy, unknown results) and a capsule endoscopy which she said showed she had dried blood in her intestine. Patient also had imaging with CTH with IV con (8/26/23) which showed her to have acute vs subacute L temporo-occipital infarct new from prior.     She had a subsequent MR Emmanuel barton (8/27/23) that showed acute to subacute large left PCA territory infarct and small acute to subacute left thalamus and basal ganglia infarcts. MR Angio of the head and neck (8/27/23) occlusion of the left PCA at the P2 level, multifocal severe right PCA stenosis, severe stenosis of the proximal right superior cerebellar artery, multifocal moderate to severe stenosis of the right indradural vertebral artery with chronic occlusion distally, and grossly patent cervical arteries without focal occlusion or significant stenosis.     Patient states that she was requesting that they OSH limit her blood draws due to her fear of needles but they did not seem to limit them and she therefore got frustrated with her stay and left AMA on 9/10. States that she was unable to get her usual HD session re-initiated before leaving the hospital and was scheduled for a tuesday session.     On Monday (1 day prior to admission) patient started to get SOB with chest pain. Said that the chest pain was mild, L sided, nonradiating, and severe in quality. Denied palpitations or syncope with her symptoms. Denied cough/sputum/hemoptysis. Denied fevers/chills. She went to an [facility] due to her SOB and chest pain, there she was noted to be hypoxic to 83% on RA and was therefore sent to the ED for further evaluation. Currently, patient in HD, reports improvement in her SOB with HD though still not fully gone, states her chest pain has resolved. Patient also reports feeling depressed due to her recent hospitalization and frequent blood draws. Denies SI/HI, denies AVH. Denies other acute complaints.     On arrival to [facility], her vitals were T 97.9, P 87, /66, RR 15, O2 sat 97% NC. Her lab work showed anemia (within recent levels), hyperkalemia, elevated troponins (though insignificant change given ESRD), and elevated proBNP. Her lactate was wnl. Her CXR showed findings concerning for fluid overload with b/l pleural effusions and R sided opacities. She was evaluated by nephrology and taken for HD overnight.     Review of recent records significant for renal dysfunction, leukopenia, normocytic anemia, elevated ferritin 871, Iron deficiency (Iron 34, transferrin 110, TIBC 143. iron sat 24), Cholesterol 151, TG 95, LDL 87, HDL 48, A1C 4.6  A1C 8/27/23 - 4.6  LDL 8/25/23 - 87  CTH noncon 8/26/23 - "...There is new acute or subacute infarct in the left temporo-occipital lobe. No evidence of acute intracranial hemorrhage or mass effect. There are stable chronic microvascular ischemic changes. Old left pontine infarct notes...."  MRA Head noncon 8/27/23 - "Motion limited..." MCA & JUDIE & basilar described as patent without significant stenosis "...Intradural Vertebral Arteries: Partially imaged. Multifocal moderate to severe stenosis of the proximal/mid right intrdural vertebral artery without occlusion distally. Patent on the left without significant stenosis" "... Superior Cerebellar arteries: Suspected focal severe stenosis proximally on the right. Otherwise patent" "...Occluded on the left at the P2 Level. Multifocal severe right P2 segment stenosis..." visualized communicating arteries are unremarkable    MRA Neck noncon 8/27/23: "severely motion limited study". RCCA,UZMA,RECA,LCCA,LICA,LECA all described as "Grossly patent and without significant stenosis" "Right vertebral artery: apparent asymmetric smaller size. Grossly patent and without signficant stenosis. Antegrade flow." "Left vertebral artery: Grossly patent and without significant stenosis. Antegrade flow."     MR Brain without IV contrast 8/27/23: "Mild findings of chronic white matter microvascular ischemic disease. Large LEFT PCA territory acute to subacute infarct involving portions of the medial temporal and occipital lobes. Additional small acute to subacute infarcts of the left thalamus and basal ganglia. Old left pontine/medullar and bilateral basal ganglia and periventricular infarcts. No evidence of intracranial hemorrhage." ventricles and sulci normal size and configuration No hydrocephalus or midline shift. "Extra-axial spaces: unremarkable" "Orbits: bilateral cataract extractions. Paranasal sinuses / mastoids: clear          She presented to  on 9/11 for SOB x 1 day found to have unsteady gait, attributed to fatigue, and hypoxia to 83% on RA responsive to NC prompted xFer to [facility] ED and admission.     ED Course:     She takes Oxcarbazepine 300mg qam and 15mg qHs for ___?  on aspirin and ticagrelor       Note incomplete; pt to be seen and assessed; plan tentative   preliminary history above form chart review     Neurology - Consult Note  -  Spectra: [phone] ([facility]), [phone] ([facility])  -  Patient is poor historian and history obtained form H&P and paper reports from prior hospitilization    HPI: Patient JL CARDOSO is a 63y (1960) woman with a PMHx significant for Polycystic kidney disease c/b ESRD on HD (via R UE AVF), hx Renal tx 1990, Lymphoma in remission, Anemia with history of blood transfusions, and CVA x2 who presents to [facility] with complaints of SOB and chest pain. Patient was recently hospitalized at [facility] for approximately 2 weeks and eventually left [place] on Sunday because she was frustrated with her care there including frequent use of needles. Patient said that she was initially in the hospital for a cardiac catheterization (pt unclear on indication and any interventions that were done, she says they wanted to place something permanently?) with procedure c/b CVA. She awoke after the procedure with left hemicephalic headache and loss of right visual field in right eye. She said she can see she just has to turn her head. Otherwise denies focal weakness, numbness, diplopia vertigo, word finding difficulties, confusion. Patient unclear on procedures and interventions done during stay but per H&P she did state that she had 1-2 pRBC transfusions while there for anemia, and endoscopy + capsule endoscopy. CTH and MRI detailed below. Started on aspirin, brilinta, ?no statin. She left [place] on 9/10 due to frequent blood draw/s needle use. States that she was unable to get her usual HD session re-initiated before leaving the hospital and was scheduled for a Tuesday session.     She has documented use of s Oxcarbazepine 300mg qam and 150mg qHs which she endorses taking but she does not know why. She notes remote hx of "seizure" like activity many years ago when she fainted in dialysis and then a doctor kept hitting her saying she was faking it. She is on aspirin and ticagrelor.    She presented to  on 9/11 for SOB x 1 day found to have unsteady gait, attributed to fatigue, and hypoxia to 83% on RA responsive to NC prompted xFer to [facility] ED and admission.   ED Course: Afebrile, HTN ~200sBP, satting well. Labs c/w renal dysfunction. CXR with fluid overload, started on HD    Review of recent records significant for renal dysfunction, leukopenia, normocytic anemia, elevated ferritin 871, Iron deficiency (Iron 34, transferrin 110, TIBC 143. iron sat 24), Cholesterol 151, TG 95, LDL 87, HDL 48, A1C 4.6  A1C 8/27/23 - 4.6  LDL 8/25/23 - 87  CTH noncon 8/26/23 - "...There is new acute or subacute infarct in the left temporo-occipital lobe. No evidence of acute intracranial hemorrhage or mass effect. There are stable chronic microvascular ischemic changes. Old left pontine infarct notes...."  MRA Head noncon 8/27/23 - "Motion limited..." MCA & JUDIE & basilar described as patent without significant stenosis "...Intradural Vertebral Arteries: Partially imaged. Multifocal moderate to severe stenosis of the proximal/mid right intrdural vertebral artery without occlusion distally. Patent on the left without significant stenosis" "... Superior Cerebellar arteries: Suspected focal severe stenosis proximally on the right. Otherwise patent" "...Occluded on the left at the P2 Level. Multifocal severe right P2 segment stenosis..." visualized communicating arteries are unremarkable    MRA Neck noncon 8/27/23: "severely motion limited study". RCCA,UZMA,RECA,LCCA,LICA,LECA all described as "Grossly patent and without significant stenosis" "Right vertebral artery: apparent asymmetric smaller size. Grossly patent and without signficant stenosis. Antegrade flow." "Left vertebral artery: Grossly patent and without significant stenosis. Antegrade flow."     MR Brain without IV contrast 8/27/23: "Mild findings of chronic white matter microvascular ischemic disease. Large LEFT PCA territory acute to subacute infarct involving portions of the medial temporal and occipital lobes. Additional small acute to subacute infarcts of the left thalamus and basal ganglia. Old left pontine/medullar and bilateral basal ganglia and periventricular infarcts. No evidence of intracranial hemorrhage." ventricles and sulci normal size and configuration No hydrocephalus or midline shift. "Extra-axial spaces: unremarkable" "Orbits: bilateral cataract extractions. Paranasal sinuses / mastoids: clear      Review of Systems:    Patient focused on hunger saying she has not eaten today. ROS limited. denies focal weakness, numbness, diplopia vertigo, word finding difficulties, confusion.    Allergies:  metoclopramide (Unknown)  Originally Entered as [Unknown] reaction to [Other][Buscopan] (Unknown)  latex (Unknown)  MAXALONE (Unknown)  adhesives (Unknown)  scopolamine (Unknown)      PMHx/PSHx/Family Hx: As above, otherwise see below   ESRD (end stage renal disease)    Dyslipidemia    HTN (hypertension)    CKD (chronic kidney disease)    CVA (cerebrovascular accident)    Depression, major        Social Hx:  No current use of tobacco, alcohol, or illicit drugs      Medications:  MEDICATIONS  (STANDING):  amLODIPine   Tablet 10 milliGRAM(s) Oral daily  aspirin enteric coated 81 milliGRAM(s) Oral daily  atorvastatin 80 milliGRAM(s) Oral at bedtime  calcium acetate 2001 milliGRAM(s) Oral three times a day with meals  chlorhexidine 2% Cloths 1 Application(s) Topical daily  epoetin pastora (EPOGEN) Injectable 76288 Unit(s) IV Push <User Schedule>  escitalopram 10 milliGRAM(s) Oral daily  folic acid 1 milliGRAM(s) Oral daily  hydrALAZINE 100 milliGRAM(s) Oral every 8 hours  influenza   Vaccine 0.5 milliLiter(s) IntraMuscular once  isosorbide   mononitrate ER Tablet (IMDUR) 30 milliGRAM(s) Oral daily  losartan 100 milliGRAM(s) Oral daily  metoprolol succinate ER 25 milliGRAM(s) Oral at bedtime  metoprolol succinate ER 50 milliGRAM(s) Oral daily  mupirocin 2% Ointment 1 Application(s) Topical two times a day  Nephro-nehemiah 1 Tablet(s) Oral daily  OXcarbazepine 300 milliGRAM(s) Oral <User Schedule>  OXcarbazepine 150 milliGRAM(s) Oral <User Schedule>  pantoprazole    Tablet 40 milliGRAM(s) Oral two times a day  ticagrelor 90 milliGRAM(s) Oral every 12 hours    MEDICATIONS  (PRN):  acetaminophen     Tablet .. 650 milliGRAM(s) Oral every 6 hours PRN Temp greater or equal to 38C (100.4F), Mild Pain (1 - 3)  aluminum hydroxide/magnesium hydroxide/simethicone Suspension 30 milliLiter(s) Oral every 4 hours PRN Dyspepsia  melatonin 3 milliGRAM(s) Oral at bedtime PRN Insomnia  ondansetron Injectable 4 milliGRAM(s) IV Push every 8 hours PRN Nausea and/or Vomiting      Vitals:  T(C): 36.7 (09-12-23 @ 12:00), Max: 37.3 (09-12-23 @ 08:46)  HR: 82 (09-12-23 @ 13:54) (69 - 87)  BP: 158/56 (09-12-23 @ 13:54) (156/54 - 202/80)  RR: 19 (09-12-23 @ 12:00) (15 - 19)  SpO2: 95% (09-12-23 @ 12:00) (93% - 100%)    Physical Examination: INCOMPLETE  General - NAD  Cardiovascular - Peripheral pulses palpable, no edema  Eyes - Fundoscopy with flat, sharp optic discs and no hemorrhage or exudates; Fundoscopy not well visualized; Fundoscopy not performed due to safety precautions in the setting of the COVID-19 pandemic    Neurologic Exam:  Mental status - Awake, Alert, Oriented to person, place, and time. Speech fluent, repetition and naming intact. Follows simple and complex commands. Attention/concentration, recent and remote memory (including registration and recall), and fund of knowledge intact    Cranial nerves - PERRLA, VFF, EOMI, face sensation (V1-V3) intact b/l, facial strength intact without asymmetry b/l, hearing intact b/l, palate with symmetric elevation, trapezius OR sternocleidomastiod 5/5 strength b/l, tongue midline on protrusion with full lateral movement    Motor - Normal bulk and tone throughout. No pronator drift.  Strength testing            Deltoid      Biceps      Triceps     Wrist Extension    Wrist Flexion     Interossei         R            5                 5               5                     5                              5                        5                 5  L             5                 5               5                     5                              5                        5                 5              Hip Flexion    Hip Extension    Knee Flexion    Knee Extension    Dorsiflexion    Plantar Flexion  R              5                           5                       5                           5                            5                          5  L              5                           5                        5                           5                            5                          5    Sensation - Light touch/temperature OR pain/vibration intact throughout    DTR's -             Biceps      Triceps     Brachioradialis      Patellar    Ankle    Toes/plantar response  R             2+             2+                  2+                       2+            2+                 Down  L              2+             2+                 2+                        2+           2+                 Down    Coordination - Finger to Nose intact b/l. No tremors appreciated    Gait and station - Normal casual gait. Romberg (-)    Labs:                        8.9    3.32  )-----------( 220      ( 12 Sep 2023 07:37 )             27.1     09-12    136  |  93<L>  |  30<H>  ----------------------------<  76  5.4<H>   |  25  |  4.40<H>    Ca    9.8      12 Sep 2023 07:37  Phos  3.1     09-12  Mg     2.20     09-12    TPro  6.5  /  Alb  3.9  /  TBili  0.6  /  DBili  x   /  AST  68<H>  /  ALT  22  /  AlkPhos  149<H>  09-12    CAPILLARY BLOOD GLUCOSE      POCT Blood Glucose.: 97 mg/dL (12 Sep 2023 11:56)    LIVER FUNCTIONS - ( 12 Sep 2023 07:37 )  Alb: 3.9 g/dL / Pro: 6.5 g/dL / ALK PHOS: 149 U/L / ALT: 22 U/L / AST: 68 U/L / GGT: x             PT/INR - ( 11 Sep 2023 20:45 )   PT: 11.1 sec;   INR: 0.98 ratio         PTT - ( 11 Sep 2023 20:45 )  PTT:32.2 sec  CSF:                  Radiology:         Neurology - Consult Note  -  Spectra: [phone] ([facility]), [phone] ([facility])  -  Patient is poor historian and history obtained form H&P and paper reports from prior hospitilization    HPI: Patient JL CARDOSO is a 63y (1960) woman with a PMHx significant for Polycystic kidney disease c/b ESRD on HD (via R UE AVF), hx Renal tx 1990, Lymphoma in remission, Anemia with history of blood transfusions, and CVA x2 who presents to [facility] with complaints of SOB and chest pain. Patient was recently hospitalized at [facility] for approximately 2 weeks and eventually left [place] on Sunday because she was frustrated with her care there including frequent use of needles. Patient said that she was initially in the hospital for a cardiac catheterization (pt unclear on indication and any interventions that were done, she says they wanted to place something permanently?) with procedure c/b CVA. She awoke after the procedure with left hemicephalic headache and loss of right visual field in right eye. She said she can see she just has to turn her head. Otherwise denies focal weakness, numbness, diplopia vertigo, word finding difficulties, confusion. Patient unclear on procedures and interventions done during stay but per H&P she did state that she had 1-2 pRBC transfusions while there for anemia, and endoscopy + capsule endoscopy. CTH and MRI detailed below. Started on aspirin, brilinta, ?no statin. She left [place] on 9/10 due to frequent blood draw/s needle use. States that she was unable to get her usual HD session re-initiated before leaving the hospital and was scheduled for a Tuesday session.     She has documented use of of Oxcarbazepine 300mg Qam and 150mg QHs which she endorses taking but she does not know why. She notes remote hx of "seizure" like activity many years ago when she fainted in dialysis and then a doctor kept hitting her saying she was faking it. She is on aspirin and ticagrelor.    She presented to  on 9/11 for SOB x 1 day found to have unsteady gait, attributed to fatigue, and hypoxia to 83% on RA responsive to NC prompted xFer to [facility] ED and admission.   ED Course: Afebrile, HTN ~200sBP, satting well. Labs c/w renal dysfunction. CXR with fluid overload, started on HD    Review of recent records significant for renal dysfunction, leukopenia, normocytic anemia, elevated ferritin 871, Iron deficiency (Iron 34, transferrin 110, TIBC 143. iron sat 24), Cholesterol 151, TG 95, LDL 87, HDL 48, A1C 4.6  A1C 8/27/23 - 4.6  LDL 8/25/23 - 87  CTH noncon 8/26/23 - "...There is new acute or subacute infarct in the left temporo-occipital lobe. No evidence of acute intracranial hemorrhage or mass effect. There are stable chronic microvascular ischemic changes. Old left pontine infarct notes...."  MRA Head noncon 8/27/23 - "Motion limited..." MCA & JUDIE & basilar described as patent without significant stenosis "...Intradural Vertebral Arteries: Partially imaged. Multifocal moderate to severe stenosis of the proximal/mid right intrdural vertebral artery without occlusion distally. Patent on the left without significant stenosis" "... Superior Cerebellar arteries: Suspected focal severe stenosis proximally on the right. Otherwise patent" "...Occluded on the left at the P2 Level. Multifocal severe right P2 segment stenosis..." visualized communicating arteries are unremarkable    MRA Neck noncon 8/27/23: "severely motion limited study". RCCA,UZMA,RECA,LCCA,LICA,LECA all described as "Grossly patent and without significant stenosis" "Right vertebral artery: apparent asymmetric smaller size. Grossly patent and without signficant stenosis. Antegrade flow." "Left vertebral artery: Grossly patent and without significant stenosis. Antegrade flow."     MR Brain without IV contrast 8/27/23: "Mild findings of chronic white matter microvascular ischemic disease. Large LEFT PCA territory acute to subacute infarct involving portions of the medial temporal and occipital lobes. Additional small acute to subacute infarcts of the left thalamus and basal ganglia. Old left pontine/medullar and bilateral basal ganglia and periventricular infarcts. No evidence of intracranial hemorrhage." ventricles and sulci normal size and configuration No hydrocephalus or midline shift. "Extra-axial spaces: unremarkable" "Orbits: bilateral cataract extractions. Paranasal sinuses / mastoids: clear      Review of Systems:    Patient focused on hunger saying she has not eaten today. ROS limited. denies focal weakness, numbness, diplopia vertigo, word finding difficulties, confusion.    Allergies:  metoclopramide (Unknown)  Originally Entered as [Unknown] reaction to [Other][Buscopan] (Unknown)  latex (Unknown)  MAXALONE (Unknown)  adhesives (Unknown)  scopolamine (Unknown)      PMHx/PSHx/Family Hx: As above, otherwise see below   ESRD (end stage renal disease)    Dyslipidemia    HTN (hypertension)    CKD (chronic kidney disease)    CVA (cerebrovascular accident)    Depression, major        Social Hx:  No current use of tobacco, alcohol, or illicit drugs      Medications:  MEDICATIONS  (STANDING):  amLODIPine   Tablet 10 milliGRAM(s) Oral daily  aspirin enteric coated 81 milliGRAM(s) Oral daily  atorvastatin 80 milliGRAM(s) Oral at bedtime  calcium acetate 2001 milliGRAM(s) Oral three times a day with meals  chlorhexidine 2% Cloths 1 Application(s) Topical daily  epoetin pastora (EPOGEN) Injectable 20252 Unit(s) IV Push <User Schedule>  escitalopram 10 milliGRAM(s) Oral daily  folic acid 1 milliGRAM(s) Oral daily  hydrALAZINE 100 milliGRAM(s) Oral every 8 hours  influenza   Vaccine 0.5 milliLiter(s) IntraMuscular once  isosorbide   mononitrate ER Tablet (IMDUR) 30 milliGRAM(s) Oral daily  losartan 100 milliGRAM(s) Oral daily  metoprolol succinate ER 25 milliGRAM(s) Oral at bedtime  metoprolol succinate ER 50 milliGRAM(s) Oral daily  mupirocin 2% Ointment 1 Application(s) Topical two times a day  Nephro-nehemiah 1 Tablet(s) Oral daily  OXcarbazepine 300 milliGRAM(s) Oral <User Schedule>  OXcarbazepine 150 milliGRAM(s) Oral <User Schedule>  pantoprazole    Tablet 40 milliGRAM(s) Oral two times a day  ticagrelor 90 milliGRAM(s) Oral every 12 hours    MEDICATIONS  (PRN):  acetaminophen     Tablet .. 650 milliGRAM(s) Oral every 6 hours PRN Temp greater or equal to 38C (100.4F), Mild Pain (1 - 3)  aluminum hydroxide/magnesium hydroxide/simethicone Suspension 30 milliLiter(s) Oral every 4 hours PRN Dyspepsia  melatonin 3 milliGRAM(s) Oral at bedtime PRN Insomnia  ondansetron Injectable 4 milliGRAM(s) IV Push every 8 hours PRN Nausea and/or Vomiting      Vitals:  T(C): 36.7 (09-12-23 @ 12:00), Max: 37.3 (09-12-23 @ 08:46)  HR: 82 (09-12-23 @ 13:54) (69 - 87)  BP: 158/56 (09-12-23 @ 13:54) (156/54 - 202/80)  RR: 19 (09-12-23 @ 12:00) (15 - 19)  SpO2: 95% (09-12-23 @ 12:00) (93% - 100%)    Physical Examination:   General - NAD - hungry  Cardiovascular - warm, no edema  Eyes - clear sclera    Neurologic Exam:  Mental status - Awake, Alert, Oriented. Speech fluent,  naming intact. Follows simple and complex commands. Attention/concentration, recent and remote memor, and fund of knowledge intact    Cranial nerves - PERRLA, VFF, EOMI, face sensation (V1-V3) intact b/l, facial strength intact without asymmetry b/l, hearing intact b/l, palate with symmetric elevation, trapezius OR sternocleidomastiod 5/5 strength b/l, tongue midline on protrusion with full lateral movement    Motor - Normal bulk and tone throughout. No pronator drift.  Strength testing            Deltoid      Biceps      Triceps     Wrist Extension    Wrist Flexion     Interossei         R            5                 5               5                     5                              5                        5                 5  L             5                 5               5                     5                              5                        5                 5              Hip Flexion    Hip Extension    Knee Flexion    Knee Extension    Dorsiflexion    Plantar Flexion  R              5                           5                       5                           5                            5                          5  L              5                           5                        5                           5                            5                          5    Sensation - Light touch/temperature OR pain/vibration intact throughout    DTR's -             Biceps      Triceps     Brachioradialis      Patellar    Ankle    Toes/plantar response  R             2+             2+                  2+                       2+            2+                 Down  L              2+             2+                 2+                        2+           2+                 Down    Coordination - Finger to Nose intact b/l. No tremors appreciated    Gait and station - Normal casual gait. Romberg (-)    Labs:                        8.9    3.32  )-----------( 220      ( 12 Sep 2023 07:37 )             27.1     09-12    136  |  93<L>  |  30<H>  ----------------------------<  76  5.4<H>   |  25  |  4.40<H>    Ca    9.8      12 Sep 2023 07:37  Phos  3.1     09-12  Mg     2.20     09-12    TPro  6.5  /  Alb  3.9  /  TBili  0.6  /  DBili  x   /  AST  68<H>  /  ALT  22  /  AlkPhos  149<H>  09-12    CAPILLARY BLOOD GLUCOSE      POCT Blood Glucose.: 97 mg/dL (12 Sep 2023 11:56)    LIVER FUNCTIONS - ( 12 Sep 2023 07:37 )  Alb: 3.9 g/dL / Pro: 6.5 g/dL / ALK PHOS: 149 U/L / ALT: 22 U/L / AST: 68 U/L / GGT: x             PT/INR - ( 11 Sep 2023 20:45 )   PT: 11.1 sec;   INR: 0.98 ratio         PTT - ( 11 Sep 2023 20:45 )  PTT:32.2 sec  CSF:                  Radiology:         Neurology - Consult Note  -  Spectra: [id number] ([facility]), [phone] ([facility])  -  Patient is poor historian and history obtained form H&P and paper reports from prior hospitilization in addition to interview    HPI: Patient JL CARDOSO is a 63y (1960) woman with a PMHx significant for Polycystic kidney disease c/b ESRD on HD (via R UE AVF), hx Renal tx 1990, Lymphoma in remission, Anemia with history of blood transfusions, and CVA x2 who presents to [facility] with complaints of SOB and chest pain. Patient was recently hospitalized at [facility] for approximately 2 weeks and eventually left AMA on Sunday because she was frustrated with her care there including frequent use of needles. Patient said that she was initially in the hospital for a cardiac catheterization (indication unclear, unclear if stents placed, pt reports procedure aborted when she had CVA) with procedure c/b CVA. She awoke after the procedure with left hemicephalic headache and loss of right visual field in right eye. She said she can see she just has to turn her head. Otherwise denies focal weakness, numbness, diplopia vertigo, word finding difficulties, confusion. Patient unclear on procedures and interventions done during stay but per H&P she did state that she had 1-2 pRBC transfusions while there for anemia, and endoscopy + capsule endoscopy. CTH and MRI detailed below. Started on aspirin, brilinta, ?no statin. She left [place] on 9/10 due to frequent blood draw/s needle use. States that she was unable to get her usual HD session re-initiated before leaving the hospital and was scheduled for a Tuesday session.     She has documented use of of Oxcarbazepine 300mg Qam and 150mg QHs which she endorses taking but she does not know why. She notes remote hx of "seizure" like activity many years ago when she fainted in dialysis and then a doctor kept hitting her saying she was faking it. She is on aspirin and ticagrelor.    She presented to  on 9/11 for SOB x 1 day found to have unsteady gait, attributed to fatigue, and hypoxia to 83% on RA responsive to NC prompted xFer to [facility] ED and admission.   ED Course: Afebrile, HTN ~200sBP, satting well. Labs c/w renal dysfunction. CXR with fluid overload, started on HD    Review of recent records significant for renal dysfunction, leukopenia, normocytic anemia, elevated ferritin 871, Iron deficiency (Iron 34, transferrin 110, TIBC 143. iron sat 24), Cholesterol 151, TG 95, LDL 87, HDL 48, A1C 4.6  A1C 8/27/23 - 4.6  LDL 8/25/23 - 87  CTH noncon 8/26/23 - "...There is new acute or subacute infarct in the left temporo-occipital lobe. No evidence of acute intracranial hemorrhage or mass effect. There are stable chronic microvascular ischemic changes. Old left pontine infarct notes...."  MRA Head noncon 8/27/23 - "Motion limited..." MCA & JUDIE & basilar described as patent without significant stenosis "...Intradural Vertebral Arteries: Partially imaged. Multifocal moderate to severe stenosis of the proximal/mid right intrdural vertebral artery without occlusion distally. Patent on the left without significant stenosis" "... Superior Cerebellar arteries: Suspected focal severe stenosis proximally on the right. Otherwise patent" "...Occluded on the left at the P2 Level. Multifocal severe right P2 segment stenosis..." visualized communicating arteries are unremarkable    MRA Neck noncon 8/27/23: "severely motion limited study". RCCA,UZMA,RECA,LCCA,LICA,LECA all described as "Grossly patent and without significant stenosis" "Right vertebral artery: apparent asymmetric smaller size. Grossly patent and without significant stenosis. Antegrade flow." "Left vertebral artery: Grossly patent and without significant stenosis. Antegrade flow."     MR Brain without IV contrast 8/27/23: "Mild findings of chronic white matter microvascular ischemic disease. Large LEFT PCA territory acute to subacute infarct involving portions of the medial temporal and occipital lobes. Additional small acute to subacute infarcts of the left thalamus and basal ganglia. Old left pontine/medullar and bilateral basal ganglia and periventricular infarcts. No evidence of intracranial hemorrhage." ventricles and sulci normal size and configuration No hydrocephalus or midline shift. "Extra-axial spaces: unremarkable" "Orbits: bilateral cataract extractions. Paranasal sinuses / mastoids: clear      Review of Systems:    Patient focused on hunger saying she has not eaten today. ROS limited. denies focal weakness, numbness, diplopia vertigo, word finding difficulties, confusion.    Allergies:  metoclopramide (Unknown)  Originally Entered as [Unknown] reaction to [Other][Buscopan] (Unknown)  latex (Unknown)  MAXALONE (Unknown)  adhesives (Unknown)  scopolamine (Unknown)      PMHx/PSHx/Family Hx: As above, otherwise see below   ESRD (end stage renal disease)    Dyslipidemia    HTN (hypertension)    CKD (chronic kidney disease)    CVA (cerebrovascular accident)    Depression, major        Social Hx:  No current use of tobacco, alcohol, or illicit drugs      Medications:  MEDICATIONS  (STANDING):  amLODIPine   Tablet 10 milliGRAM(s) Oral daily  aspirin enteric coated 81 milliGRAM(s) Oral daily  atorvastatin 80 milliGRAM(s) Oral at bedtime  calcium acetate 2001 milliGRAM(s) Oral three times a day with meals  chlorhexidine 2% Cloths 1 Application(s) Topical daily  epoetin pastora (EPOGEN) Injectable 65462 Unit(s) IV Push <User Schedule>  escitalopram 10 milliGRAM(s) Oral daily  folic acid 1 milliGRAM(s) Oral daily  hydrALAZINE 100 milliGRAM(s) Oral every 8 hours  influenza   Vaccine 0.5 milliLiter(s) IntraMuscular once  isosorbide   mononitrate ER Tablet (IMDUR) 30 milliGRAM(s) Oral daily  losartan 100 milliGRAM(s) Oral daily  metoprolol succinate ER 25 milliGRAM(s) Oral at bedtime  metoprolol succinate ER 50 milliGRAM(s) Oral daily  mupirocin 2% Ointment 1 Application(s) Topical two times a day  Nephro-nehemiah 1 Tablet(s) Oral daily  OXcarbazepine 300 milliGRAM(s) Oral <User Schedule>  OXcarbazepine 150 milliGRAM(s) Oral <User Schedule>  pantoprazole    Tablet 40 milliGRAM(s) Oral two times a day  ticagrelor 90 milliGRAM(s) Oral every 12 hours    MEDICATIONS  (PRN):  acetaminophen     Tablet .. 650 milliGRAM(s) Oral every 6 hours PRN Temp greater or equal to 38C (100.4F), Mild Pain (1 - 3)  aluminum hydroxide/magnesium hydroxide/simethicone Suspension 30 milliLiter(s) Oral every 4 hours PRN Dyspepsia  melatonin 3 milliGRAM(s) Oral at bedtime PRN Insomnia  ondansetron Injectable 4 milliGRAM(s) IV Push every 8 hours PRN Nausea and/or Vomiting      Vitals:  T(C): 36.7 (09-12-23 @ 12:00), Max: 37.3 (09-12-23 @ 08:46)  HR: 82 (09-12-23 @ 13:54) (69 - 87)  BP: 158/56 (09-12-23 @ 13:54) (156/54 - 202/80)  RR: 19 (09-12-23 @ 12:00) (15 - 19)  SpO2: 95% (09-12-23 @ 12:00) (93% - 100%)    Physical Examination:   General - NAD - hungry  Cardiovascular - warm, no edema  Eyes - clear sclera    Neurologic Exam:  Mental status - Awake, Alert, Oriented to person, place, time, evvents. Speech fluent,  naming intact. Follows simple and complex commands. Attention/concentration, recent and remote memor, and fund of knowledge intact    Cranial nerves - PERRLA, VFF except for RIGHT MONOCULAR RIGHT HEMIANOPIA (on repeat exam this was a RIGHT MONOCULAR RIGHT SUPERIOR QUANDRANINOPIA), EOMI, face sensation (V1-V3) intact b/l, facial strength intact without asymmetry b/l, hearing intact b/l, palate with symmetric elevation, trapezius 4+/4+ strength b/l, tongue midline on protrusion with full lateral movement    Motor - Normal bulk and tone throughout.   Strength testing  Effort dependent exam especially in wrist and hand            Deltoid      Biceps      Triceps     Wrist Extension    Wrist Flexion     Interossei         R            4+            4+               4+                   4                       4                  4                 4+  L             5                 5               5                     4                       4                  4                 5              Hip Flexion         Knee Extension    Dorsiflexion    Plantar Flexion  R              4                         4                      5                           5           L              4+                       4+                   5                           5                     Sensation - Light touch intact throughout    DTR's -  Exam limited by refusal             Biceps      Triceps     Brachioradialis                Patellar      Toes/plantar response  R             2+             2+                  2+                       2+                          mute  L              2+             2+                 2+                        2+                         mute    Coordination - Finger to Nose intact b/l. No tremors appreciated    Gait and station - Normal casual gait. Able to toe and heel walk. DId not attempt tandem walk. Romberg (-)    Labs:                        8.9    3.32  )-----------( 220      ( 12 Sep 2023 07:37 )             27.1     09-12    136  |  93<L>  |  30<H>  ----------------------------<  76  5.4<H>   |  25  |  4.40<H>    Ca    9.8      12 Sep 2023 07:37  Phos  3.1     09-12  Mg     2.20     09-12    TPro  6.5  /  Alb  3.9  /  TBili  0.6  /  DBili  x   /  AST  68<H>  /  ALT  22  /  AlkPhos  149<H>  09-12    CAPILLARY BLOOD GLUCOSE      POCT Blood Glucose.: 97 mg/dL (12 Sep 2023 11:56)    LIVER FUNCTIONS - ( 12 Sep 2023 07:37 )  Alb: 3.9 g/dL / Pro: 6.5 g/dL / ALK PHOS: 149 U/L / ALT: 22 U/L / AST: 68 U/L / GGT: x             PT/INR - ( 11 Sep 2023 20:45 )   PT: 11.1 sec;   INR: 0.98 ratio         PTT - ( 11 Sep 2023 20:45 )  PTT:32.2 sec  CSF:                  Radiology:

## 2023-09-12 NOTE — DIETITIAN NUTRITION RISK NOTIFICATION - TREATMENT: THE FOLLOWING DIET HAS BEEN RECOMMENDED
Diet, Renal Restrictions:   For patients receiving Renal Replacement - No Protein Restr, No Conc K, No Conc Phos, Low Sodium (09-12-23 @ 13:45) [Active]

## 2023-09-12 NOTE — PROGRESS NOTE ADULT - SUBJECTIVE AND OBJECTIVE BOX
Marisa Hernandez MD   [facility] Medicine  Teams preferred  Pager: [phone]    Patient is a 63y old  Female who presents with a chief complaint of SOB, Chest pain (12 Sep 2023 12:31)      INTERVAL HPI/OVERNIGHT EVENTS: Overnight Pt completed a session of HD. Today given a dose of lokelma. BP elevated. Discussed with patient, states she doesn't like frequent lab draws and she is not diabetic. She would also want to go back to regular renal diet not minced    She    MEDICATIONS  (STANDING):  amLODIPine   Tablet 10 milliGRAM(s) Oral daily  aspirin enteric coated 81 milliGRAM(s) Oral daily  atorvastatin 80 milliGRAM(s) Oral at bedtime  calcium acetate 2001 milliGRAM(s) Oral three times a day with meals  chlorhexidine 2% Cloths 1 Application(s) Topical daily  epoetin pastora (EPOGEN) Injectable 42141 Unit(s) IV Push <User Schedule>  escitalopram 10 milliGRAM(s) Oral daily  folic acid 1 milliGRAM(s) Oral daily  hydrALAZINE 100 milliGRAM(s) Oral every 8 hours  influenza   Vaccine 0.5 milliLiter(s) IntraMuscular once  isosorbide   mononitrate ER Tablet (IMDUR) 30 milliGRAM(s) Oral daily  losartan 100 milliGRAM(s) Oral daily  metoprolol succinate ER 50 milliGRAM(s) Oral daily  metoprolol succinate ER 25 milliGRAM(s) Oral at bedtime  mupirocin 2% Ointment 1 Application(s) Topical two times a day  Nephro-nehemiah 1 Tablet(s) Oral daily  OXcarbazepine 300 milliGRAM(s) Oral <User Schedule>  OXcarbazepine 150 milliGRAM(s) Oral <User Schedule>  pantoprazole    Tablet 40 milliGRAM(s) Oral two times a day  ticagrelor 90 milliGRAM(s) Oral every 12 hours    MEDICATIONS  (PRN):  acetaminophen     Tablet .. 650 milliGRAM(s) Oral every 6 hours PRN Temp greater or equal to 38C (100.4F), Mild Pain (1 - 3)  aluminum hydroxide/magnesium hydroxide/simethicone Suspension 30 milliLiter(s) Oral every 4 hours PRN Dyspepsia  melatonin 3 milliGRAM(s) Oral at bedtime PRN Insomnia  ondansetron Injectable 4 milliGRAM(s) IV Push every 8 hours PRN Nausea and/or Vomiting      Allergies    metoclopramide (Unknown)  Originally Entered as [Unknown] reaction to [Other][Buscopan] (Unknown)  latex (Unknown)  MAXALONE (Unknown)  adhesives (Unknown)  scopolamine (Unknown)    Intolerances        REVIEW OF SYSTEMS:  Please see interval HPI:    Vital Signs Last 24 Hrs  T(C): 36.7 (12 Sep 2023 12:00), Max: 37.3 (12 Sep 2023 08:46)  T(F): 98 (12 Sep 2023 12:00), Max: 99.2 (12 Sep 2023 08:46)  HR: 82 (12 Sep 2023 13:54) (69 - 87)  BP: 158/56 (12 Sep 2023 13:54) (156/54 - 202/80)  BP(mean): --  RR: 19 (12 Sep 2023 12:00) (15 - 19)  SpO2: 95% (12 Sep 2023 12:00) (93% - 100%)    Parameters below as of 12 Sep 2023 12:00  Patient On (Oxygen Delivery Method): room air      I&O's Detail    11 Sep 2023 07:01  -  12 Sep 2023 07:00  --------------------------------------------------------  IN:    Oral Fluid: 316 mL    Other (mL): 400 mL  Total IN: 716 mL    OUT:    Other (mL): 3200 mL  Total OUT: 3200 mL    Total NET: -2484 mL      12 Sep 2023 07:01  -  12 Sep 2023 14:06  --------------------------------------------------------  IN:    Oral Fluid: 100 mL  Total IN: 100 mL    OUT:    Voided (mL): 0 mL  Total OUT: 0 mL    Total NET: 100 mL            PHYSICAL EXAM:  Vital Signs Last 24 Hrs  T(C): 36.7 (12 Sep 2023 12:00), Max: 37.3 (12 Sep 2023 08:46)  T(F): 98 (12 Sep 2023 12:00), Max: 99.2 (12 Sep 2023 08:46)  HR: 82 (12 Sep 2023 13:54) (69 - 87)  BP: 158/56 (12 Sep 2023 13:54) (156/54 - 202/80)  BP(mean): --  RR: 19 (12 Sep 2023 12:00) (15 - 19)  SpO2: 95% (12 Sep 2023 12:00) (93% - 100%)    Parameters below as of 12 Sep 2023 12:00  Patient On (Oxygen Delivery Method): room air      CONSTITUTIONAL: NAD,   ENMT: Moist oral mucosa,   RESPIRATORY: Normal respiratory effort; lungs are clear to auscultation bilaterally  CARDIOVASCULAR: Regular rate and rhythm, normal S1 and S2, no murmur/rub/gallop; No lower extremity edema;   ABDOMEN: Nontender to palpation, normoactive bowel sounds, no rebound/guarding; No hepatosplenomegaly  EXT: no edema b/l  NEUROLOGY: alert, following commands  SKIN: No rashes; no palpable lesions      LABS:                        8.9    3.32  )-----------( 220      ( 12 Sep 2023 07:37 )             27.1     12 Sep 2023 07:37    136    |  93     |  30     ----------------------------<  76     5.4     |  25     |  4.40     Ca    9.8        12 Sep 2023 07:37  Phos  3.1       12 Sep 2023 07:37  Mg     2.20      12 Sep 2023 07:37    TPro  6.5    /  Alb  3.9    /  TBili  0.6    /  DBili  x      /  AST  68     /  ALT  22     /  AlkPhos  149    12 Sep 2023 07:37    PT/INR - ( 11 Sep 2023 20:45 )   PT: 11.1 sec;   INR: 0.98 ratio         PTT - ( 11 Sep 2023 20:45 )  PTT:32.2 sec  CAPILLARY BLOOD GLUCOSE      POCT Blood Glucose.: 97 mg/dL (12 Sep 2023 11:56)  POCT Blood Glucose.: 90 mg/dL (12 Sep 2023 08:00)  POCT Blood Glucose.: 137 mg/dL (12 Sep 2023 02:08)    BLOOD CULTURE    RADIOLOGY & ADDITIONAL TESTS:    Imaging Personally Reviewed:  [ ] YES     Consultant(s) Notes Reviewed:      Care Discussed with Consultants/Other Providers: Marisa Hernandez MD   [facility] Medicine  Teams preferred  Pager: [phone]    Patient is a 63y old  Female who presents with a chief complaint of SOB, Chest pain (12 Sep 2023 12:31)      INTERVAL HPI/OVERNIGHT EVENTS: Overnight Pt completed a session of HD. Today given a dose of lokelma. BP elevated. Discussed with patient, states she doesn't like frequent lab draws and she is not diabetic. She would also want to go back to regular renal diet not minced    She and family state that she was supposed to go into the hospital for a planned stent placement. However, her renal doctor also noted some rectal bleeding and sent her in to be assessed. Pt was on some sort of blood thinner, proceeded to angiogram and chris-procedure (unclear if it was before or during), Pt developed a stroke. Unclear if stent was placed per family. Records are being requested. Mild chest pain but improved with HD    MEDICATIONS  (STANDING):  amLODIPine   Tablet 10 milliGRAM(s) Oral daily  aspirin enteric coated 81 milliGRAM(s) Oral daily  atorvastatin 80 milliGRAM(s) Oral at bedtime  calcium acetate 2001 milliGRAM(s) Oral three times a day with meals  chlorhexidine 2% Cloths 1 Application(s) Topical daily  epoetin pastora (EPOGEN) Injectable 47074 Unit(s) IV Push <User Schedule>  escitalopram 10 milliGRAM(s) Oral daily  folic acid 1 milliGRAM(s) Oral daily  hydrALAZINE 100 milliGRAM(s) Oral every 8 hours  influenza   Vaccine 0.5 milliLiter(s) IntraMuscular once  isosorbide   mononitrate ER Tablet (IMDUR) 30 milliGRAM(s) Oral daily  losartan 100 milliGRAM(s) Oral daily  metoprolol succinate ER 50 milliGRAM(s) Oral daily  metoprolol succinate ER 25 milliGRAM(s) Oral at bedtime  mupirocin 2% Ointment 1 Application(s) Topical two times a day  Nephro-nehemiah 1 Tablet(s) Oral daily  OXcarbazepine 300 milliGRAM(s) Oral <User Schedule>  OXcarbazepine 150 milliGRAM(s) Oral <User Schedule>  pantoprazole    Tablet 40 milliGRAM(s) Oral two times a day  ticagrelor 90 milliGRAM(s) Oral every 12 hours    MEDICATIONS  (PRN):  acetaminophen     Tablet .. 650 milliGRAM(s) Oral every 6 hours PRN Temp greater or equal to 38C (100.4F), Mild Pain (1 - 3)  aluminum hydroxide/magnesium hydroxide/simethicone Suspension 30 milliLiter(s) Oral every 4 hours PRN Dyspepsia  melatonin 3 milliGRAM(s) Oral at bedtime PRN Insomnia  ondansetron Injectable 4 milliGRAM(s) IV Push every 8 hours PRN Nausea and/or Vomiting      Allergies    metoclopramide (Unknown)  Originally Entered as [Unknown] reaction to [Other][Buscopan] (Unknown)  latex (Unknown)  MAXALONE (Unknown)  adhesives (Unknown)  scopolamine (Unknown)    Intolerances        REVIEW OF SYSTEMS:  Please see interval HPI:    Vital Signs Last 24 Hrs  T(C): 36.7 (12 Sep 2023 12:00), Max: 37.3 (12 Sep 2023 08:46)  T(F): 98 (12 Sep 2023 12:00), Max: 99.2 (12 Sep 2023 08:46)  HR: 82 (12 Sep 2023 13:54) (69 - 87)  BP: 158/56 (12 Sep 2023 13:54) (156/54 - 202/80)  BP(mean): --  RR: 19 (12 Sep 2023 12:00) (15 - 19)  SpO2: 95% (12 Sep 2023 12:00) (93% - 100%)    Parameters below as of 12 Sep 2023 12:00  Patient On (Oxygen Delivery Method): room air      I&O's Detail    11 Sep 2023 07:01  -  12 Sep 2023 07:00  --------------------------------------------------------  IN:    Oral Fluid: 316 mL    Other (mL): 400 mL  Total IN: 716 mL    OUT:    Other (mL): 3200 mL  Total OUT: 3200 mL    Total NET: -2484 mL      12 Sep 2023 07:01  -  12 Sep 2023 14:06  --------------------------------------------------------  IN:    Oral Fluid: 100 mL  Total IN: 100 mL    OUT:    Voided (mL): 0 mL  Total OUT: 0 mL    Total NET: 100 mL            PHYSICAL EXAM:  Vital Signs Last 24 Hrs  T(C): 36.7 (12 Sep 2023 12:00), Max: 37.3 (12 Sep 2023 08:46)  T(F): 98 (12 Sep 2023 12:00), Max: 99.2 (12 Sep 2023 08:46)  HR: 82 (12 Sep 2023 13:54) (69 - 87)  BP: 158/56 (12 Sep 2023 13:54) (156/54 - 202/80)  BP(mean): --  RR: 19 (12 Sep 2023 12:00) (15 - 19)  SpO2: 95% (12 Sep 2023 12:00) (93% - 100%)    Parameters below as of 12 Sep 2023 12:00  Patient On (Oxygen Delivery Method): room air      CONSTITUTIONAL: NAD,   ENMT: Moist oral mucosa,   RESPIRATORY: Normal respiratory effort; lungs are clear to auscultation bilaterally  CARDIOVASCULAR: Regular rate and rhythm, normal S1 and S2, soft systolic murmur,  No lower extremity edema;   ABDOMEN: Nontender to palpation, normoactive bowel sounds, no rebound/guarding; No hepatosplenomegaly  EXT: RUE fistula noted, +thrill  NEUROLOGY: alert, following commands  SKIN: No rashes; no palpable lesions      LABS:                        8.9    3.32  )-----------( 220      ( 12 Sep 2023 07:37 )             27.1     12 Sep 2023 07:37    136    |  93     |  30     ----------------------------<  76     5.4     |  25     |  4.40     Ca    9.8        12 Sep 2023 07:37  Phos  3.1       12 Sep 2023 07:37  Mg     2.20      12 Sep 2023 07:37    TPro  6.5    /  Alb  3.9    /  TBili  0.6    /  DBili  x      /  AST  68     /  ALT  22     /  AlkPhos  149    12 Sep 2023 07:37    PT/INR - ( 11 Sep 2023 20:45 )   PT: 11.1 sec;   INR: 0.98 ratio         PTT - ( 11 Sep 2023 20:45 )  PTT:32.2 sec  CAPILLARY BLOOD GLUCOSE      POCT Blood Glucose.: 97 mg/dL (12 Sep 2023 11:56)  POCT Blood Glucose.: 90 mg/dL (12 Sep 2023 08:00)  POCT Blood Glucose.: 137 mg/dL (12 Sep 2023 02:08)    BLOOD CULTURE    RADIOLOGY & ADDITIONAL TESTS:    Imaging Personally Reviewed:  [ ] YES     Consultant(s) Notes Reviewed:      Care Discussed with Consultants/Other Providers:

## 2023-09-12 NOTE — PHYSICAL THERAPY INITIAL EVALUATION ADULT - LEVEL OF INDEPENDENCE: STAND/SIT, REHAB EVAL
functional mobility assessment held secondary to /52 at rest, repeat measurement 185/50. MONE Bishop immediately notified

## 2023-09-12 NOTE — PHYSICAL THERAPY INITIAL EVALUATION ADULT - LEVEL OF INDEPENDENCE: SUPINE/SIT, REHAB EVAL
functional mobility assessment held secondary to /52 at rest, repeat measurement 185/50. MONE Bishop immediately notified.

## 2023-09-12 NOTE — DIETITIAN INITIAL EVALUATION ADULT - NUTRITIONGOAL OUTCOME1
1. Consume >75% of estimated calories/protein needs   2. No further weight loss with goal for weight gain

## 2023-09-12 NOTE — OCCUPATIONAL THERAPY INITIAL EVALUATION ADULT - PERTINENT HX OF CURRENT PROBLEM, REHAB EVAL
Pt is 63 year old Female with history stated below, presents to [facility] with complaints of SOB and chest pain. Patient was recently hospitalized at [facility] for approximately 2 weeks and eventually left AMA on Sunday because she was frustrated with her care there. Patient said that she was initially in the hospital for a cardiac catheterization (pt unclear on indication and any interventions that were done) with procedure complicated by CVA (unclear if she had the CVA during the procedure or after) and prolonged stay.

## 2023-09-12 NOTE — DIETITIAN INITIAL EVALUATION ADULT - PROBLEM SELECTOR PLAN 3
- Had L sided chest pain with her SOB, non-radiating, chest pain now resolved  - Trops elevated but stable given ESRD, CK and CKMB without acute findings, EKG non-ischemic  - Check TTE  - Monitor on telemetry  - Cards eval in AM (as per primary team)  - See if we can obtain records of her cardiac angiogram from [facility] and clarify if any interventions were done

## 2023-09-12 NOTE — DIETITIAN INITIAL EVALUATION ADULT - PROBLEM SELECTOR PLAN 5
- Anemia likely 2/2 ESRD but patient also reports having had a GI work up with endoscopy and capsule endoscopy with findings of "dried blood" in her intestines as per patient  - On pantoprazole 40mg BID, will c/w  - Monitor H/H  - See if we can obtain her recent hospitalization records from [facility]

## 2023-09-12 NOTE — DIETITIAN INITIAL EVALUATION ADULT - PERTINENT MEDS FT
MEDICATIONS  (STANDING):  amLODIPine   Tablet 10 milliGRAM(s) Oral daily  aspirin enteric coated 81 milliGRAM(s) Oral daily  atorvastatin 80 milliGRAM(s) Oral at bedtime  calcium acetate 2001 milliGRAM(s) Oral three times a day with meals  chlorhexidine 2% Cloths 1 Application(s) Topical daily  epoetin pastora (EPOGEN) Injectable 15974 Unit(s) IV Push <User Schedule>  escitalopram 10 milliGRAM(s) Oral daily  folic acid 1 milliGRAM(s) Oral daily  hydrALAZINE 100 milliGRAM(s) Oral every 8 hours  influenza   Vaccine 0.5 milliLiter(s) IntraMuscular once  isosorbide   mononitrate ER Tablet (IMDUR) 30 milliGRAM(s) Oral daily  losartan 100 milliGRAM(s) Oral daily  metoprolol succinate ER 50 milliGRAM(s) Oral daily  metoprolol succinate ER 25 milliGRAM(s) Oral at bedtime  mupirocin 2% Ointment 1 Application(s) Topical two times a day  Nephro-nehemiah 1 Tablet(s) Oral daily  OXcarbazepine 300 milliGRAM(s) Oral <User Schedule>  OXcarbazepine 150 milliGRAM(s) Oral <User Schedule>  pantoprazole    Tablet 40 milliGRAM(s) Oral two times a day  ticagrelor 90 milliGRAM(s) Oral every 12 hours    MEDICATIONS  (PRN):  acetaminophen     Tablet .. 650 milliGRAM(s) Oral every 6 hours PRN Temp greater or equal to 38C (100.4F), Mild Pain (1 - 3)  aluminum hydroxide/magnesium hydroxide/simethicone Suspension 30 milliLiter(s) Oral every 4 hours PRN Dyspepsia  melatonin 3 milliGRAM(s) Oral at bedtime PRN Insomnia  ondansetron Injectable 4 milliGRAM(s) IV Push every 8 hours PRN Nausea and/or Vomiting

## 2023-09-12 NOTE — PROGRESS NOTE ADULT - SUBJECTIVE AND OBJECTIVE BOX
NEPHROLOGY     Patient seen and examined resting comfortably, reports of headache, denies cp or sob and at present comfortable on room air, in no acute distress. Tolerated HD last night and removed 2.8L.     MEDICATIONS  (STANDING):  amLODIPine   Tablet 10 milliGRAM(s) Oral daily  aspirin enteric coated 81 milliGRAM(s) Oral daily  atorvastatin 80 milliGRAM(s) Oral at bedtime  calcium acetate 2001 milliGRAM(s) Oral three times a day with meals  chlorhexidine 2% Cloths 1 Application(s) Topical daily  epoetin pastora (EPOGEN) Injectable 96363 Unit(s) IV Push <User Schedule>  escitalopram 10 milliGRAM(s) Oral daily  folic acid 1 milliGRAM(s) Oral daily  hydrALAZINE 100 milliGRAM(s) Oral every 8 hours  influenza   Vaccine 0.5 milliLiter(s) IntraMuscular once  isosorbide   mononitrate ER Tablet (IMDUR) 30 milliGRAM(s) Oral daily  losartan 100 milliGRAM(s) Oral daily  metoprolol succinate ER 50 milliGRAM(s) Oral daily  metoprolol succinate ER 25 milliGRAM(s) Oral at bedtime  Nephro-nehemiah 1 Tablet(s) Oral daily  OXcarbazepine 300 milliGRAM(s) Oral <User Schedule>  OXcarbazepine 150 milliGRAM(s) Oral <User Schedule>  pantoprazole    Tablet 40 milliGRAM(s) Oral two times a day  sodium zirconium cyclosilicate 5 Gram(s) Oral once  ticagrelor 90 milliGRAM(s) Oral every 12 hours    VITALS:  T(C): , Max: 37.3 (09-12-23 @ 08:46)  T(F): , Max: 99.2 (09-12-23 @ 08:46)  HR: 80 (09-12-23 @ 12:00)  BP: 187/57 (09-12-23 @ 12:00)  RR: 19 (09-12-23 @ 12:00)  SpO2: 95% (09-12-23 @ 12:00)    I and O's:    09-11 @ 07:01  -  09-12 @ 07:00  --------------------------------------------------------  IN: 716 mL / OUT: 3200 mL / NET: -2484 mL    09-12 @ 07:01  -  09-12 @ 12:32  --------------------------------------------------------  IN: 100 mL / OUT: 0 mL / NET: 100 mL    Height (cm): 152.4 (09-12 @ 04:00)  Weight (kg): 38.5 (09-12 @ 04:00)  BMI (kg/m2): 16.6 (09-12 @ 04:00)  BSA (m2): 1.3 (09-12 @ 04:00)    PHYSICAL EXAM:  Constitutional: NAD, Alert  HEENT: NCAT, MMM  Neck: Supple, No JVD  Respiratory: diminished at bases   Cardiovascular: RRR s1s2, no m/r/g  Gastrointestinal: BS+, soft, NT/ND  Extremities: No peripheral edema b/l  Neurological: no focal deficits; strength grossly intact  Back: no CVAT b/l  Skin: No rashes, no nevi  Vascular Access: RUE AVF (+thrill)     LABS:                        8.9    3.32  )-----------( 220      ( 12 Sep 2023 07:37 )             27.1     09-12    136  |  93<L>  |  30<H>  ----------------------------<  76  5.4<H>   |  25  |  4.40<H>    Ca    9.8      12 Sep 2023 07:37  Phos  3.1     09-12  Mg     2.20     09-12    TPro  6.5  /  Alb  3.9  /  TBili  0.6  /  DBili  x   /  AST  68<H>  /  ALT  22  /  AlkPhos  149<H>  09-12      Urine Studies:  Urinalysis Basic - ( 12 Sep 2023 07:37 )    Color: x / Appearance: x / SG: x / pH: x  Gluc: 76 mg/dL / Ketone: x  / Bili: x / Urobili: x   Blood: x / Protein: x / Nitrite: x   Leuk Esterase: x / RBC: x / WBC x   Sq Epi: x / Non Sq Epi: x / Bacteria: x    RADIOLOGY & ADDITIONAL STUDIES:    < from: Xray Chest 1 View AP/PA (09.11.23 @ 20:59) >    ACC: [id number] EXAM:  XR CHEST AP OR PA 1V   ORDERED BY: SARAH PALMA     PROCEDURE DATE:  09/11/2023          INTERPRETATION:  EXAMINATION: XR CHEST    CLINICAL INDICATION: Shortness of breath, evaluate for pulmonary edema.    TECHNIQUE: Single frontal, portable view of the chest was obtained.    COMPARISON: Chest x-ray 12/16/2017.    FINDINGS:  Support devices: Right subclavian vascular stent.  The cardiomediastinal silhouette is  not accurately assessed in this AP   projection.    Perihilar haze right more than left associated with effusions consistent   with edema.  No pneumothorax.  No acute bony abnormality.    IMPRESSION: Perihilar haze more on the right than the left side with   effusions consistent with CHF.      --- End of Report ---        AZEEM CLINTON MD; Resident Radiologist  This document has been electronically signed.  AZEEM FULLER MD; Attending Radiologist  This document has been electronically signed. Sep 12 2023  8:09AM    < end of copied text >

## 2023-09-12 NOTE — PHYSICAL THERAPY INITIAL EVALUATION ADULT - ADDITIONAL COMMENTS
Pt lives in a private house with , +1 step to enter, resides on main level, was independent with all functional mobility and ADL performance without use of an assistive device.     Pt left semi-supine in bed, all lines intact, all needs in reach, bed alarm set, in NAD. MONE acuna.

## 2023-09-12 NOTE — DIETITIAN INITIAL EVALUATION ADULT - NS FNS DIET ORDER
Diet, Renal Restrictions:   For patients receiving Renal Replacement - No Protein Restr, No Conc K, No Conc Phos, Low Sodium (09-12-23 @ 13:45)

## 2023-09-12 NOTE — CONSULT NOTE ADULT - ASSESSMENT
note incomlete    Recommendations  []Continue DAPT  []Obtain OSH ECHO, if unable preform TTE   []Continue atorvastatin  note incomlete    Recommendations  []Continue DAPT  []Obtain OSH ECHO, if unable preform TTE   []Continue atorvastatin Ms. 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 Recent admission to [facility] for cardiac cath (unclear if stented) c/b CVA. On ASA, Brilinta, Atorvastatin ?GI workup ongoing    Impression: Right monocular field cut due to left posterior ischemia; etiology iatrogenic/cardioembolic; chronic right weakness due to left pontine dysfunction; etiology likely small vessel disease    Recommendations  []Continue DAPT  []TTE with bubble  []Continue atorvastatin, goal LDL <70  []Continue glycemic control, A1C 4.6 in august is within goal  []PT/OT Eval    Patient was seen and discussed with stroke attending Dr. Isaak Diego  Ms. 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 Recent admission to [facility] for cardiac cath (unclear if stented) c/b CVA. On ASA, Brilinta, Atorvastatin ?GI workup ongoing    Impression: Right monocular field cut due to left posterior ischemia; etiology iatrogenic/cardioembolic; chronic right weakness due to left pontine dysfunction; etiology likely small vessel disease    Recommendations  []Continue DAPT  []TTE with bubble  []Continue atorvastatin, goal LDL <70  []Continue glycemic control, A1C 4.6 in august is within goal  []PT/OT Eval  []F/U with established neurologist or, if she would prefer, patient may follow up with Stroke NP, Fang Whipple or Adrianna Marte in clinic at [address] [place] [address]. Please email [email or web address] w/ basic PHI.     Patient was seen and discussed with stroke attending Dr. Isaak Diego  Ms. 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 Recent admission to [facility] for cardiac cath (unclear if stented) c/b CVA. On ASA, Brilinta, Atorvastatin ?GI workup ongoing    Impression: Right monocular field cut due to left posterior ischemia; etiology iatrogenic/cardioembolic; chronic right weakness due to left pontine dysfunction; etiology likely small vessel disease    ***addendum*** CVA is not the primary reason for this hospitalization     Recommendations  []Continue DAPT  []TTE with bubble  []Continue atorvastatin, goal LDL <70  []Continue glycemic control, A1C 4.6 in august is within goal  []PT/OT Eval  []F/U with established neurologist or, if she would prefer, patient may follow up with Stroke NP, Fang Whipple or Adrianna Marte in clinic at [address] [place] [address]. Please email [email or web address] w/ basic PHI.     Patient was seen and discussed with stroke attending Dr. Isaak Diego

## 2023-09-12 NOTE — DIETITIAN INITIAL EVALUATION ADULT - OTHER INFO
Patient was seen at bedside. A&Ox4. No known food allergies. Patient is consuming <50% of meals because patient does not like the texture of the food. Explained to patient that they would have to have a swallow evaluation to upgrade diet consistency. Patient stated that they already had a swallow evaluation at previous hospital. No chewing or swallowing issues. Denies nausea or vomiting. No constipation or diarrhea. Last bowel movement was today. Patient's usual body weight is 94.6 lbs, current body weight is 84.8 lbs. Per HIE patient was 97.1 lbs 3/31, and 123.7 lbs 7/21. Patient has a weight loss of -12.3 lbs (-12.7%).

## 2023-09-12 NOTE — OCCUPATIONAL THERAPY INITIAL EVALUATION ADULT - LEVEL OF CONSCIOUSNESS, OT EVAL
Assessment & Plan  Gastroenteritis - viral most likely.  BRAT diet.  zofran PRN.  No indication for abx.  Note for work - out of work today and plan for return on Fri.  -     ondansetron (ZOFRAN) 4 MG tablet; Take 1 tablet (4 mg total) by mouth every 6 (six) hours as needed for Nausea.  Dispense: 8 tablet; Refill: 0        There are no discontinued medications.    Follow-up: No Follow-up on file.      =================================================================      Chief Complaint   Patient presents with    Emesis    Diarrhea       JD Hope is a 47 y.o. female, last appointment with this clinic was 4/26/2017.    Patient's last menstrual period was 04/19/2017.    Started with headache on Monday.  But yesterday started with nausea vomiting and diarrhea.  She vomited in the morning, vomited up her breakfast.  Vomited last night and this morning.  Didn't eat anything this AM - vomited up water.  No blood.  Drinking Powerade. Diarrhea started yesterday evening.  2 times.  Watery.  No blood.  Sick contacts - 2 days ago, grandby sick with flu like symptoms - she's 8;  She was also vomiting.  Pt without URI sx.  OTC meds for this - pepto bismol for this.  Did not eat anything today.     havign current menses with significant cramping.    Patient Care Team:  Hillary Hill MD as PCP - General (Internal Medicine)    There are no active problems to display for this patient.      PAST MEDICAL HISTORY:  Past Medical History:   Diagnosis Date    Acid reflux     Allergy     Anemia     Hypertension        PAST SURGICAL HISTORY:  Past Surgical History:   Procedure Laterality Date    TUBAL LIGATION         SOCIAL HISTORY:  Social History     Social History    Marital status: Single     Spouse name: N/A    Number of children: N/A    Years of education: N/A     Occupational History    [facility] [employer] [facility] [employer]     Social History Main Topics    Smoking status: Former Smoker    Smokeless tobacco: Not on  "file    Alcohol use 0.0 oz/week     0 Standard drinks or equivalent per week      Comment: socially    Drug use: No    Sexual activity: Yes     Other Topics Concern    Not on file     Social History Narrative       ALLERGIES AND MEDICATIONS: updated and reviewed.  Review of patient's allergies indicates:  No Known Allergies  Current Outpatient Prescriptions   Medication Sig Dispense Refill    cetirizine (ZYRTEC) 10 MG tablet Take 10 mg by mouth once daily.      fluticasone (FLONASE) 50 mcg/actuation nasal spray 2 sprays qnostril daily x 7 days, then decr to 1 spray qnostril 16 g 11    lisinopril (PRINIVIL,ZESTRIL) 20 MG tablet Take 20 mg by mouth once daily.   5     No current facility-administered medications for this visit.        Review of Systems   Constitutional: Negative for chills and fever.   Respiratory: Negative for shortness of breath.    Cardiovascular: Negative for chest pain.   Genitourinary: Negative for dysuria.   Neurological: Negative for headaches.       Physical Exam   Vitals:    05/10/17 1445   BP: 122/77   Pulse: 63   Temp: 98.9 °F (37.2 °C)   TempSrc: Oral   SpO2: 99%   Weight: 64.1 kg (141 lb 6.8 oz)   Height: 5' 4" (1.626 m)    Body mass index is 24.28 kg/(m^2).  Weight: 64.1 kg (141 lb 6.8 oz)   Height: 5' 4" (162.6 cm)     Physical Exam   Constitutional: She is oriented to person, place, and time. She appears well-developed and well-nourished. No distress.   HENT:   TMs clear; OP clear   Eyes: EOM are normal.   Cardiovascular: Normal rate, regular rhythm and normal heart sounds.    No murmur heard.  Pulmonary/Chest: Effort normal and breath sounds normal.   Abdominal:   Tender she attributes to the menses cramping. Bowel sounds normal timbre   Musculoskeletal: Normal range of motion.   Neurological: She is alert and oriented to person, place, and time. Coordination normal.   Skin: Skin is warm and dry.   Psychiatric: She has a normal mood and affect. Her behavior is normal. Thought " content normal.      alert

## 2023-09-12 NOTE — DIETITIAN INITIAL EVALUATION ADULT - PROBLEM SELECTOR PLAN 7
- Medications reconciled as per recent discharge notes from [facility] pharmacist emailed for medication reconciliation

## 2023-09-12 NOTE — PHYSICAL THERAPY INITIAL EVALUATION ADULT - PERTINENT HX OF CURRENT PROBLEM, REHAB EVAL
Pt is 63 year old Female with history stated below, presents to [facility] with complaints of SOB and chest pain. Patient was recently hospitalized at [facility] for approximately 2 weeks and eventually left AMA on Sunday because she was frustrated with her care there. Patient said that she was initially in the hospital for a cardiac catheterization (pt unclear on indication and any interventions that were done) with procedure complicated by CVA (unclear if she had the CVA during the procedure or after) and prolonged stay.

## 2023-09-12 NOTE — PROVIDER CONTACT NOTE (OTHER) - ACTION/TREATMENT ORDERED:
Dr Guido made aware. no further orders.
as per   MD Mamadou Winter ( nephrologist) - use 2k bath for first 2 hours and 20 minutes and 1 k bath last 40 minutes.

## 2023-09-12 NOTE — PROGRESS NOTE ADULT - ASSESSMENT
ASSESSMENT:  63 yo F hx polycystic kidney disease, ESRD (on HD MWF), renal transplant in 1990, lymphoma in remission, CVA  came with sob. nephrology  consulted  for emergent dialysis.     (1)ESRD - MWF - s/p HD last night  (2)CV - hypervolemic, CHF noted on CXR, BP elevated - home antihypertensives resumed   (3)Hyperkalemia - improved s/p HD, K+ severely hemolyzed this a.m.  (4)Anemia - ESRD related, s/p Retacrit yesterday     RECOMMEND:  (1)PUF today, 2.5 hrs, 2.5L UF as able  (2)HD tomorrow: 3 hrs, 3L UF   (3)Retacrit 10k units with HD  (4)Follow up echo   (5)Cards and pulm eval pending     Esme Cleary, MARY LOU  [facility]  [phone]        ASSESSMENT:  61 yo F hx polycystic kidney disease, ESRD (on HD MWF), renal transplant in 1990, lymphoma in remission, CVA  came with sob. nephrology  consulted  for emergent dialysis.     (1)ESRD - MWF - s/p HD last night  (2)CV - hypervolemic, CHF noted on CXR, BP elevated - home antihypertensives resumed   (3)Hyperkalemia - improved s/p HD, K+ severely hemolyzed this a.m.  (4)Anemia - ESRD related, s/p Retacrit yesterday     RECOMMEND:  (1)PUF today, 2.5 hrs, 2.5L UF as able  (2)HD tomorrow: 3 hrs, 3L UF   (3)Retacrit 10k units with HD  (4)Follow up echo   (5)Cards and pulm eval pending     D/w Dr. Spear and informed HF unit     Esme Cleary DNP  [facility]  [phone]        ASSESSMENT:  61 yo F hx polycystic kidney disease, ESRD (on HD MWF), renal transplant in 1990, lymphoma in remission, CVA  came with sob. nephrology  consulted  for emergent dialysis.     (1)ESRD - MWF - s/p HD last night  (2)CV - hypervolemic, CHF noted on CXR, BP elevated - home antihypertensives resumed   (3)Hyperkalemia - improved s/p HD, K+ severely hemolyzed this a.m.  (4)Anemia - ESRD related, s/p Retacrit yesterday     RECOMMEND:  (1)PUF today, 2.5 hrs, 2.5L UF as able  (2)HD tomorrow: 3 hrs, 3L UF   (3)Retacrit 10k units with HD  (4)Follow up echo   (5)Cards and pulm eval pending     D/w Dr. Spear and informed HD unit     Esme Cleary DNP  [facility]  [phone]       RENAL ATTENDING NOTE  Patient seen and examined with NP. Agree with assessment and plan as above.  Best that we utilize her time as inpatient to optimize volume status, given her dyspnea on admission; this would include PUF today.    Christoph Spear MD  [facility]  [phone]

## 2023-09-12 NOTE — PROVIDER CONTACT NOTE (OTHER) - ASSESSMENT
pt is A+O-x4 at baseline. resting in stretcher comfortably.
patient AOx4 -patient refusing to come to dialysis as per team.

## 2023-09-12 NOTE — PROVIDER CONTACT NOTE (OTHER) - RECOMMENDATIONS
as per   MD Mamadou Winter ( nephrologist) - use 2k bath for first 2 hours and 20 minutes and 1 k bath last 40 minutes.
Notify nephrology

## 2023-09-12 NOTE — DIETITIAN INITIAL EVALUATION ADULT - REASON FOR ADMISSION
Chronic pulmonary edema    63F with history of Polycystic kidney disease c/b ESRD on HD (via R UE AVF), hx Renal tx 1990, Lymphoma in remission, Anemia with history of blood transfusions, and CVA x2 (with R eye hemianopsia and R facial droop) who presents to [facility] with complaints of SOB and chest pain

## 2023-09-12 NOTE — DIETITIAN INITIAL EVALUATION ADULT - PROBLEM SELECTOR PLAN 2
- Elevated proBNP with CXR concerning for CHF, patient reports having had a cardiac angiogram prior to her recent admission to [facility], unclear if she has fluid overload 2/2 missed HD session vs cardiac issues  - Trops elevated but stable given ESRD, CK and CKMB without acute findings, EKG non-ischemic  - Check TTE (has ALEX on cardiac auscultation)  - Monitor on telemetry  - Cards eval in AM (as per primary team)  - See if we can obtain records of her cardiac angiogram from [facility] and clarify if any interventions were done

## 2023-09-12 NOTE — PATIENT PROFILE ADULT - FALL HARM RISK - HARM RISK INTERVENTIONS
Assistance with ambulation/Assistance OOB with selected safe patient handling equipment/Communicate Risk of Fall with Harm to all staff/Discuss with provider need for PT consult/Monitor gait and stability/Move patient closer to nurses' station/Provide patient with walking aids - walker, cane, crutches/Reinforce activity limits and safety measures with patient and family/Reorient to person, place and time as needed/Tailored Fall Risk Interventions/Visual Cue: Yellow wristband and red socks/Bed in lowest position, wheels locked, appropriate side rails in place/Call bell, personal items and telephone in reach/Instruct patient to call for assistance before getting out of bed or chair/Non-slip footwear when patient is out of bed/[place] to call system/Physically safe environment - no spills, clutter or unnecessary equipment/Purposeful Proactive Rounding/Room/bathroom lighting operational, light cord in reach

## 2023-09-13 LAB
ALBUMIN SERPL ELPH-MCNC: 3.8 G/DL — SIGNIFICANT CHANGE UP (ref 3.3–5)
ALP SERPL-CCNC: 156 U/L — HIGH (ref 40–120)
ALT FLD-CCNC: 13 U/L — SIGNIFICANT CHANGE UP (ref 4–33)
ANION GAP SERPL CALC-SCNC: 18 MMOL/L — HIGH (ref 7–14)
AST SERPL-CCNC: 21 U/L — SIGNIFICANT CHANGE UP (ref 4–32)
BASOPHILS # BLD AUTO: 0.03 K/UL — SIGNIFICANT CHANGE UP (ref 0–0.2)
BASOPHILS NFR BLD AUTO: 0.8 % — SIGNIFICANT CHANGE UP (ref 0–2)
BILIRUB SERPL-MCNC: 0.5 MG/DL — SIGNIFICANT CHANGE UP (ref 0.2–1.2)
BUN SERPL-MCNC: 53 MG/DL — HIGH (ref 7–23)
CALCIUM SERPL-MCNC: 9.9 MG/DL — SIGNIFICANT CHANGE UP (ref 8.4–10.5)
CHLORIDE SERPL-SCNC: 94 MMOL/L — LOW (ref 98–107)
CO2 SERPL-SCNC: 22 MMOL/L — SIGNIFICANT CHANGE UP (ref 22–31)
CREAT SERPL-MCNC: 6.97 MG/DL — HIGH (ref 0.5–1.3)
EGFR: 6 ML/MIN/1.73M2 — LOW
EOSINOPHIL # BLD AUTO: 0.17 K/UL — SIGNIFICANT CHANGE UP (ref 0–0.5)
EOSINOPHIL NFR BLD AUTO: 4.7 % — SIGNIFICANT CHANGE UP (ref 0–6)
GI PCR PANEL: SIGNIFICANT CHANGE UP
GLUCOSE BLDC GLUCOMTR-MCNC: 136 MG/DL — HIGH (ref 70–99)
GLUCOSE SERPL-MCNC: 120 MG/DL — HIGH (ref 70–99)
HCT VFR BLD CALC: 27.8 % — LOW (ref 34.5–45)
HGB BLD-MCNC: 8.7 G/DL — LOW (ref 11.5–15.5)
IANC: 2 K/UL — SIGNIFICANT CHANGE UP (ref 1.8–7.4)
IMM GRANULOCYTES NFR BLD AUTO: 0.3 % — SIGNIFICANT CHANGE UP (ref 0–0.9)
LYMPHOCYTES # BLD AUTO: 0.98 K/UL — LOW (ref 1–3.3)
LYMPHOCYTES # BLD AUTO: 27.1 % — SIGNIFICANT CHANGE UP (ref 13–44)
MCHC RBC-ENTMCNC: 28.3 PG — SIGNIFICANT CHANGE UP (ref 27–34)
MCHC RBC-ENTMCNC: 31.3 GM/DL — LOW (ref 32–36)
MCV RBC AUTO: 90.6 FL — SIGNIFICANT CHANGE UP (ref 80–100)
MONOCYTES # BLD AUTO: 0.43 K/UL — SIGNIFICANT CHANGE UP (ref 0–0.9)
MONOCYTES NFR BLD AUTO: 11.9 % — SIGNIFICANT CHANGE UP (ref 2–14)
NEUTROPHILS # BLD AUTO: 2 K/UL — SIGNIFICANT CHANGE UP (ref 1.8–7.4)
NEUTROPHILS NFR BLD AUTO: 55.2 % — SIGNIFICANT CHANGE UP (ref 43–77)
NRBC # BLD: 0 /100 WBCS — SIGNIFICANT CHANGE UP (ref 0–0)
NRBC # FLD: 0 K/UL — SIGNIFICANT CHANGE UP (ref 0–0)
NT-PROBNP SERPL-SCNC: HIGH PG/ML
PLATELET # BLD AUTO: 207 K/UL — SIGNIFICANT CHANGE UP (ref 150–400)
POTASSIUM SERPL-MCNC: 5.1 MMOL/L — SIGNIFICANT CHANGE UP (ref 3.5–5.3)
POTASSIUM SERPL-SCNC: 5.1 MMOL/L — SIGNIFICANT CHANGE UP (ref 3.5–5.3)
PROT SERPL-MCNC: 6.4 G/DL — SIGNIFICANT CHANGE UP (ref 6–8.3)
RBC # BLD: 3.07 M/UL — LOW (ref 3.8–5.2)
RBC # FLD: 15.4 % — HIGH (ref 10.3–14.5)
SODIUM SERPL-SCNC: 134 MMOL/L — LOW (ref 135–145)
TROPONIN T, HIGH SENSITIVITY RESULT: 93 NG/L — CRITICAL HIGH
WBC # BLD: 3.62 K/UL — LOW (ref 3.8–10.5)
WBC # FLD AUTO: 3.62 K/UL — LOW (ref 3.8–10.5)

## 2023-09-13 PROCEDURE — 93010 ELECTROCARDIOGRAM REPORT: CPT

## 2023-09-13 PROCEDURE — 99223 1ST HOSP IP/OBS HIGH 75: CPT

## 2023-09-13 PROCEDURE — 70450 CT HEAD/BRAIN W/O DYE: CPT | Mod: 26

## 2023-09-13 PROCEDURE — 99233 SBSQ HOSP IP/OBS HIGH 50: CPT

## 2023-09-13 RX ORDER — METOPROLOL TARTRATE 50 MG
5 TABLET ORAL EVERY 6 HOURS
Refills: 0 | Status: DISCONTINUED | OUTPATIENT
Start: 2023-09-13 | End: 2023-09-13

## 2023-09-13 RX ORDER — TICAGRELOR 90 MG/1
90 TABLET ORAL EVERY 12 HOURS
Refills: 0 | Status: DISCONTINUED | OUTPATIENT
Start: 2023-09-13 | End: 2023-09-15

## 2023-09-13 RX ORDER — HYDRALAZINE HCL 50 MG
5 TABLET ORAL EVERY 8 HOURS
Refills: 0 | Status: DISCONTINUED | OUTPATIENT
Start: 2023-09-13 | End: 2023-09-15

## 2023-09-13 RX ORDER — METOPROLOL TARTRATE 50 MG
2.5 TABLET ORAL ONCE
Refills: 0 | Status: COMPLETED | OUTPATIENT
Start: 2023-09-13 | End: 2023-09-13

## 2023-09-13 RX ORDER — ASPIRIN/CALCIUM CARB/MAGNESIUM 324 MG
81 TABLET ORAL DAILY
Refills: 0 | Status: DISCONTINUED | OUTPATIENT
Start: 2023-09-13 | End: 2023-09-15

## 2023-09-13 RX ADMIN — OXCARBAZEPINE 150 MILLIGRAM(S): 300 TABLET, FILM COATED ORAL at 21:08

## 2023-09-13 RX ADMIN — MUPIROCIN 1 APPLICATION(S): 20 OINTMENT TOPICAL at 17:18

## 2023-09-13 RX ADMIN — ERYTHROPOIETIN 10000 UNIT(S): 10000 INJECTION, SOLUTION INTRAVENOUS; SUBCUTANEOUS at 14:31

## 2023-09-13 RX ADMIN — Medication 2001 MILLIGRAM(S): at 08:23

## 2023-09-13 RX ADMIN — PANTOPRAZOLE SODIUM 40 MILLIGRAM(S): 20 TABLET, DELAYED RELEASE ORAL at 05:18

## 2023-09-13 RX ADMIN — LOSARTAN POTASSIUM 100 MILLIGRAM(S): 100 TABLET, FILM COATED ORAL at 05:18

## 2023-09-13 RX ADMIN — Medication 100 MILLIGRAM(S): at 05:18

## 2023-09-13 RX ADMIN — Medication 25 MILLIGRAM(S): at 21:09

## 2023-09-13 RX ADMIN — Medication 81 MILLIGRAM(S): at 17:15

## 2023-09-13 RX ADMIN — Medication 50 MILLIGRAM(S): at 05:19

## 2023-09-13 RX ADMIN — PANTOPRAZOLE SODIUM 40 MILLIGRAM(S): 20 TABLET, DELAYED RELEASE ORAL at 17:16

## 2023-09-13 RX ADMIN — Medication 2001 MILLIGRAM(S): at 17:17

## 2023-09-13 RX ADMIN — Medication 650 MILLIGRAM(S): at 06:35

## 2023-09-13 RX ADMIN — CHLORHEXIDINE GLUCONATE 1 APPLICATION(S): 213 SOLUTION TOPICAL at 11:38

## 2023-09-13 RX ADMIN — MUPIROCIN 1 APPLICATION(S): 20 OINTMENT TOPICAL at 05:19

## 2023-09-13 RX ADMIN — Medication 100 MILLIGRAM(S): at 21:08

## 2023-09-13 RX ADMIN — Medication 650 MILLIGRAM(S): at 12:33

## 2023-09-13 RX ADMIN — TICAGRELOR 90 MILLIGRAM(S): 90 TABLET ORAL at 17:15

## 2023-09-13 RX ADMIN — ESCITALOPRAM OXALATE 10 MILLIGRAM(S): 10 TABLET, FILM COATED ORAL at 17:15

## 2023-09-13 RX ADMIN — Medication 2.5 MILLIGRAM(S): at 00:42

## 2023-09-13 RX ADMIN — Medication 1 TABLET(S): at 17:17

## 2023-09-13 RX ADMIN — Medication 650 MILLIGRAM(S): at 13:10

## 2023-09-13 RX ADMIN — ISOSORBIDE MONONITRATE 30 MILLIGRAM(S): 60 TABLET, EXTENDED RELEASE ORAL at 17:16

## 2023-09-13 RX ADMIN — AMLODIPINE BESYLATE 10 MILLIGRAM(S): 2.5 TABLET ORAL at 05:19

## 2023-09-13 RX ADMIN — ATORVASTATIN CALCIUM 80 MILLIGRAM(S): 80 TABLET, FILM COATED ORAL at 21:08

## 2023-09-13 RX ADMIN — Medication 650 MILLIGRAM(S): at 06:57

## 2023-09-13 RX ADMIN — OXCARBAZEPINE 300 MILLIGRAM(S): 300 TABLET, FILM COATED ORAL at 08:23

## 2023-09-13 NOTE — CONSULT NOTE ADULT - SUBJECTIVE AND OBJECTIVE BOX
Patient seen and evaluated at bedside    Chief Complaint:    HPI:  Please note patient is a limited historian with regards to her recent hospitalization at [facility]. Attempted to reach family at [phone] but went to , left  for call back.    This is a 63F with history of Polycystic kidney disease c/b ESRD on HD (via R UE AVF), hx Renal tx , Lymphoma in remission, Anemia with history of blood transfusions, and CVA x2 (with R eye hemianopsia and R facial droop) who presents to [facility] with complaints of SOB and chest pain. Patient was recently hospitalized at [facility] for approximately 2 weeks and eventually left AMA on  because she was frustrated with her care there. Patient said that she was initially in the hospital for a cardiac catheterization (pt unclear on indication and any interventions that were done) with procedure c/b CVA (unclear if she had the CVA during the procedure or after) and prolonged stay. Patient unclear on procedures and interventions done during stay but did state that she had 1-2 pRBC transfusions while there for anemia, states that she had an endoscopy (unclear if only EGD or also with colonoscopy, unknown results) and a capsule endoscopy which she said showed she had dried blood in her intestine. Patient also had imaging with CTH with IV con (23) which showed her to have acute vs subacute L temporo-occipital infarct new from prior. She had a subsequent MR Emmanuelsarthak barton (23) that showed acute to subacute large left PCA territory infarct and small acute to subacute left thalamus and basal ganglia infarcts. MR Angio of the head and neck (23) occlusion of the left PCA at the P2 level, multifocal severe right PCA stenosis, severe stenosis of the proximal right superior cerebellar artery, multifocal moderate to severe stenosis of the right indradural vertebral artery with chronic occlusion distally, and grossly patent cervical arteries without focal occlusion or significant stenosis. Patient states that she was requesting that they OSH limit her blood draws due to her fear of needles but they did not seem to limit them and she therefore got frustrated with her stay and left AMA on 9/10. States that she was unable to get her usual HD session re-initiated before leaving the hospital and was scheduled for a tuesday session. On Monday (1 day prior to admission) patient started to get SOB with chest pain. Said that the chest pain was mild, L sided, nonradiating, and severe in quality. Denied palpitations or syncope with her symptoms. Denied cough/sputum/hemoptysis. Denied fevers/chills. [place] [facility] [place] and chest pain, there she was noted to be hypoxic to 83% on RA and was therefore sent to the ED for further evaluation. Currently, patient in HD, reports improvement in her SOB with HD though still not fully gone, states her chest pain has resolved. Patient also reports feeling depressed due to her recent hospitalization and frequent blood draws. Denies SI/HI, denies AVH. Denies other acute complaints.     On arrival to [facility], her vitals were T 97.9, P 87, /66, RR 15, O2 sat 97% NC. Her lab work showed anemia (within recent levels), hyperkalemia, elevated troponins (though insignificant change given ESRD), and elevated proBNP. Her lactate was wnl. Her CXR showed findings concerning for fluid overload with b/l pleural effusions and R sided opacities. She was evaluated by nephrology and taken for HD overnight.  (11 Sep 2023 23:45)      PMHx:   ESRD (end stage renal disease)    Dyslipidemia    HTN (hypertension)    CKD (chronic kidney disease)    CVA (cerebrovascular accident)    Depression, major        PSHx:   AV fistula    Hx of hysterectomy    History of     History of cataract surgery    Failed kidney transplant    History of myomectomy    Status post exploratory laparotomy    No significant past surgical history        Allergies:  metoclopramide (Unknown)  Originally Entered as [Unknown] reaction to [Other][Buscopan] (Unknown)  latex (Unknown)  MAXALONE (Unknown)  adhesives (Unknown)  scopolamine (Unknown)      Home Meds:    Current Medications:   acetaminophen     Tablet .. 650 milliGRAM(s) Oral every 6 hours PRN  aluminum hydroxide/magnesium hydroxide/simethicone Suspension 30 milliLiter(s) Oral every 4 hours PRN  amLODIPine   Tablet 10 milliGRAM(s) Oral daily  aspirin enteric coated 81 milliGRAM(s) Oral daily  atorvastatin 80 milliGRAM(s) Oral at bedtime  calcium acetate 2001 milliGRAM(s) Oral three times a day with meals  chlorhexidine 2% Cloths 1 Application(s) Topical daily  epoetin pastora (EPOGEN) Injectable 19723 Unit(s) IV Push <User Schedule>  escitalopram 10 milliGRAM(s) Oral daily  folic acid 1 milliGRAM(s) Oral daily  hydrALAZINE 100 milliGRAM(s) Oral every 8 hours  influenza   Vaccine 0.5 milliLiter(s) IntraMuscular once  isosorbide   mononitrate ER Tablet (IMDUR) 30 milliGRAM(s) Oral daily  losartan 100 milliGRAM(s) Oral daily  melatonin 3 milliGRAM(s) Oral at bedtime PRN  metoprolol succinate ER 50 milliGRAM(s) Oral daily  metoprolol succinate ER 25 milliGRAM(s) Oral at bedtime  mupirocin 2% Ointment 1 Application(s) Topical two times a day  Nephro-nehemiah 1 Tablet(s) Oral daily  ondansetron Injectable 4 milliGRAM(s) IV Push every 8 hours PRN  OXcarbazepine 300 milliGRAM(s) Oral <User Schedule>  OXcarbazepine 150 milliGRAM(s) Oral <User Schedule>  pantoprazole    Tablet 40 milliGRAM(s) Oral two times a day  ticagrelor 90 milliGRAM(s) Oral every 12 hours      FAMILY HISTORY:  No pertinent family history in first degree relatives        Social History:  Smoking History:  Alcohol Use:  Drug Use:    REVIEW OF SYSTEMS:  All other review of systems is negative unless indicated above.    Physical Exam:  T(F): 97.3 (), Max: 98.1 ()  HR: 72 () (72 - 89)  BP: 158/51 () (148/64 - 189/53)  RR: 16 ()  SpO2: 100% ()  GENERAL: No acute distress, well-developed  HEAD:  Atraumatic, Normocephalic  ENT: EOMI, PERRLA, conjunctiva and sclera clear, Neck supple, No JVD, moist mucosa  CHEST/LUNG: Clear to auscultation bilaterally; No wheeze, equal breath sounds bilaterally   BACK: No spinal tenderness  HEART: Regular rate and rhythm; No murmurs, rubs, or gallops  ABDOMEN: Soft, Nontender, Nondistended; Bowel sounds present  EXTREMITIES:  No clubbing, cyanosis, or edema  PSYCH: Nl behavior, nl affect  NEUROLOGY: AAOx3, non-focal, cranial nerves intact  SKIN: Normal color, No rashes or lesions  LINES:    Cardiovascular Diagnostic Testing:    ECG: Personally reviewed:    Echo: Personally reviewed:    Stress Testing:    Cath:    Imaging:    CXR: Personally reviewed    Labs: Personally reviewed                        8.9    3.32  )-----------( 220      ( 12 Sep 2023 07:37 )             27.1         136  |  93<L>  |  30<H>  ----------------------------<  76  5.4<H>   |  25  |  4.40<H>    Ca    9.8      12 Sep 2023 07:37  Phos  3.1       Mg     2.20         TPro  6.5  /  Alb  3.9  /  TBili  0.6  /  DBili  x   /  AST  68<H>  /  ALT  22  /  AlkPhos  149<H>      PT/INR - ( 11 Sep 2023 20:45 )   PT: 11.1 sec;   INR: 0.98 ratio         PTT - ( 11 Sep 2023 20:45 )  PTT:32.2 sec    CARDIAC MARKERS ( 12 Sep 2023 07:37 )  86 ng/L / x     / x     / 160 U/L / x     / 1.8 ng/mL  CARDIAC MARKERS ( 11 Sep 2023 22:00 )  94 ng/L / x     / x     / x     / x     / x      CARDIAC MARKERS ( 11 Sep 2023 20:45 )  88 ng/L / x     / x     / x     / x     / x          Total Cholesterol: 194  LDL: --  HDL: 52  T      Thyroid Stimulating Hormone, Serum: 3.38 uIU/mL ( @ 07:37)     Patient seen and evaluated at bedside    Chief Complaint: SOB    HPI (per admitting provider):  This is a 63F with history of Polycystic kidney disease c/b ESRD on HD (via R UE AVF), hx Renal tx , Lymphoma in remission, Anemia with history of blood transfusions, and CVA x2 (with R eye hemianopsia and R facial droop) who presents to [facility] with complaints of SOB and chest pain. Patient was recently hospitalized at [facility] for approximately 2 weeks and eventually left AMA on  because she was frustrated with her care there. Patient said that she was initially in the hospital for a cardiac catheterization (pt unclear on indication and any interventions that were done) with procedure c/b CVA (unclear if she had the CVA during the procedure or after) and prolonged stay. Patient unclear on procedures and interventions done during stay but did state that she had 1-2 pRBC transfusions while there for anemia, states that she had an endoscopy (unclear if only EGD or also with colonoscopy, unknown results) and a capsule endoscopy which she said showed she had dried blood in her intestine. Patient also had imaging with CTH with IV con (23) which showed her to have acute vs subacute L temporo-occipital infarct new from prior. She had a subsequent MR Brain noncon (23) that showed acute to subacute large left PCA territory infarct and small acute to subacute left thalamus and basal ganglia infarcts. MR Angio of the head and neck (23) occlusion of the left PCA at the P2 level, multifocal severe right PCA stenosis, severe stenosis of the proximal right superior cerebellar artery, multifocal moderate to severe stenosis of the right indradural vertebral artery with chronic occlusion distally, and grossly patent cervical arteries without focal occlusion or significant stenosis. Patient states that she was requesting that they OSH limit her blood draws due to her fear of needles but they did not seem to limit them and she therefore got frustrated with her stay and left AMA on 9/10. States that she was unable to get her usual HD session re-initiated before leaving the hospital and was scheduled for a tuesday session. On Monday (1 day prior to admission) patient started to get SOB with chest pain. Said that the chest pain was mild, L sided, nonradiating, and severe in quality. Denied palpitations or syncope with her symptoms. Denied cough/sputum/hemoptysis. Denied fevers/chills. She went [place] an [facility] [place] to her [place]B and chest pain, there she was noted to be hypoxic to 83% on RA and was therefore sent to the ED for further evaluation. Currently, patient in HD, reports improvement in her SOB with HD though still not fully gone, states her chest pain has resolved. Patient also reports feeling depressed due to her recent hospitalization and frequent blood draws. Denies SI/HI, denies AVH. Denies other acute complaints.     On arrival to [facility], her vitals were T 97.9, P 87, /66, RR 15, O2 sat 97% NC. Her lab work showed anemia (within recent levels), hyperkalemia, elevated troponins (though insignificant change given ESRD), and elevated proBNP. Her lactate was wnl. Her CXR showed findings concerning for fluid overload with b/l pleural effusions and R sided opacities. She was evaluated by nephrology and taken for HD overnight.       Cardiology consulted for consideration for possible PCI given that was the plan at [facility].    On my evaluation, patient just states she's very tired because she was unable to sleep last night. She denies current chest pain or SOB.     Per primary team, she's had some intermittent chest pain.    Troponin flat x3 88 --> 94 --> 86  EKG show NSR, 1st degree block, no ischemic changes.  TTE with normal LV systolic function, normal RV function    PMHx:   ESRD (end stage renal disease)    Dyslipidemia    HTN (hypertension)    CKD (chronic kidney disease)    CVA (cerebrovascular accident)    Depression, major        PSHx:   AV fistula    Hx of hysterectomy    History of     History of cataract surgery    Failed kidney transplant    History of myomectomy    Status post exploratory laparotomy    No significant past surgical history        Allergies:  metoclopramide (Unknown)  Originally Entered as [Unknown] reaction to [Other][Buscopan] (Unknown)  latex (Unknown)  MAXALONE (Unknown)  adhesives (Unknown)  scopolamine (Unknown)      Home Meds:    Current Medications:   acetaminophen     Tablet .. 650 milliGRAM(s) Oral every 6 hours PRN  aluminum hydroxide/magnesium hydroxide/simethicone Suspension 30 milliLiter(s) Oral every 4 hours PRN  amLODIPine   Tablet 10 milliGRAM(s) Oral daily  aspirin enteric coated 81 milliGRAM(s) Oral daily  atorvastatin 80 milliGRAM(s) Oral at bedtime  calcium acetate 2001 milliGRAM(s) Oral three times a day with meals  chlorhexidine 2% Cloths 1 Application(s) Topical daily  epoetin pastora (EPOGEN) Injectable 69558 Unit(s) IV Push <User Schedule>  escitalopram 10 milliGRAM(s) Oral daily  folic acid 1 milliGRAM(s) Oral daily  hydrALAZINE 100 milliGRAM(s) Oral every 8 hours  influenza   Vaccine 0.5 milliLiter(s) IntraMuscular once  isosorbide   mononitrate ER Tablet (IMDUR) 30 milliGRAM(s) Oral daily  losartan 100 milliGRAM(s) Oral daily  melatonin 3 milliGRAM(s) Oral at bedtime PRN  metoprolol succinate ER 50 milliGRAM(s) Oral daily  metoprolol succinate ER 25 milliGRAM(s) Oral at bedtime  mupirocin 2% Ointment 1 Application(s) Topical two times a day  Nephro-nehemiah 1 Tablet(s) Oral daily  ondansetron Injectable 4 milliGRAM(s) IV Push every 8 hours PRN  OXcarbazepine 300 milliGRAM(s) Oral <User Schedule>  OXcarbazepine 150 milliGRAM(s) Oral <User Schedule>  pantoprazole    Tablet 40 milliGRAM(s) Oral two times a day  ticagrelor 90 milliGRAM(s) Oral every 12 hours      FAMILY HISTORY:  No pertinent family history in first degree relatives      REVIEW OF SYSTEMS:  All other review of systems is negative unless indicated above.    Physical Exam:  T(F): 97.3 (), Max: 98.1 ()  HR: 72 () (72 - 89)  BP: 158/51 () (148/64 - 189/53)  RR: 16 ()  SpO2: 100% ()  GENERAL: No acute distress, well-developed  EYES: EOMI, PERRL, conjunctiva and sclera clear  ENT: Neck supple, + JVD, moist mucosa  CHEST/LUNG: +Diffuse crackles throughout, no wheezing  BACK: No spinal tenderness, no CVA TTP  HEART: Regular rate and rhythm; +ALEX  ABDOMEN: Soft, Nontender, Nondistended; Bowel sounds present  EXTREMITIES: +DP/PT/Radial pulses, +mild pitting edema mostly around her ankles, + R UE AVF with palpable thrill  PSYCH: Nl behavior, nl affect  NEUROLOGY: AAOx3, speech slightly halting but patient states this is not unusual for her, mild R facial asymmetry, tongue midline, V1-V3 SILT b/l, EOMI/PERRL but R sided hemianopsia noted on visual field testing, shoulder shrug intact b/l; SILT b/l UE and LE; b/l UE 4+/5 strength (seems effort limited), b/l LE 5/5 strength  SKIN: Normal color, No rashes or lesions      CXR: Personally reviewed    Labs: Personally reviewed                        8.9    3.32  )-----------( 220      ( 12 Sep 2023 07:37 )             27.1         136  |  93<L>  |  30<H>  ----------------------------<  76  5.4<H>   |  25  |  4.40<H>    Ca    9.8      12 Sep 2023 07:37  Phos  3.1       Mg     2.20         TPro  6.5  /  Alb  3.9  /  TBili  0.6  /  DBili  x   /  AST  68<H>  /  ALT  22  /  AlkPhos  149<H>      PT/INR - ( 11 Sep 2023 20:45 )   PT: 11.1 sec;   INR: 0.98 ratio         PTT - ( 11 Sep 2023 20:45 )  PTT:32.2 sec    CARDIAC MARKERS ( 12 Sep 2023 07:37 )  86 ng/L / x     / x     / 160 U/L / x     / 1.8 ng/mL  CARDIAC MARKERS ( 11 Sep 2023 22:00 )  94 ng/L / x     / x     / x     / x     / x      CARDIAC MARKERS ( 11 Sep 2023 20:45 )  88 ng/L / x     / x     / x     / x     / x          Total Cholesterol: 194  LDL: --  HDL: 52  T      Thyroid Stimulating Hormone, Serum: 3.38 uIU/mL ( @ 07:37)

## 2023-09-13 NOTE — CHART NOTE - NSCHARTNOTEFT_GEN_A_CORE
Spoke to primary neuro team. Will resume DAPT in the event that Pt may have had stent placed at OSH. Still pending records. Plan is to consult Cards in AM    Marisa Hernandez MD

## 2023-09-13 NOTE — PROGRESS NOTE ADULT - ASSESSMENT
ASSESSMENT:  63 yo F hx polycystic kidney disease, ESRD (on HD MWF), renal transplant in 1990, lymphoma in remission, CVA  came with sob. nephrology  consulted  for emergent dialysis.     (1)ESRD - MWF - last dialyzed Monday, refused PUF yesterday  (2)CV - hypervolemic, CHF noted on CXR, BP elevated - home antihypertensives resumed   (3)Hyperkalemia - improved as of late s/p HD, K+ severely hemolyzed yesterday  (4)Anemia - ESRD related, s/p Retacrit   (5)Pulm - effusion on CT chest   (6)Neuro - CTH with new L occipital hemorrhagic stroke; BP goal to be 140-160/90.    RECOMMEND:  (1)HD today: 3 hrs, 3L UF   (2)Neuro/cardio follow up    Esme Cleary DNP  [facility]  [phone]        ASSESSMENT:  63 yo F hx polycystic kidney disease, ESRD (on HD MWF), renal transplant in 1990, lymphoma in remission, CVA  came with sob. nephrology  consulted  for emergent dialysis.     (1)ESRD - MWF - last dialyzed Monday, refused PUF yesterday  (2)CV - hypervolemic, CHF noted on CXR, BP elevated - home antihypertensives resumed   (3)Hyperkalemia - improved as of late s/p HD, K+ severely hemolyzed yesterday  (4)Anemia - ESRD related, s/p Retacrit   (5)Pulm - effusion on CT chest   (6)Neuro - CTH with new L occipital hemorrhagic stroke; BP goal to be 140-160/90.    RECOMMEND:  (1)HD today: 3 hrs, 3L UF   (2)Retacrit 10k units with HD  (3)Neuro/cardio follow up    Esme Cleary DNP  [facility]  [phone]        ASSESSMENT:  61 yo F hx polycystic kidney disease, ESRD (on HD MWF), renal transplant in 1990, lymphoma in remission, CVA  came with sob. nephrology  consulted  for emergent dialysis.     (1)ESRD - MWF - last dialyzed Monday, refused PUF yesterday  (2)CV - hypervolemic, CHF noted on CXR, BP elevated - home antihypertensives resumed   (3)Hyperkalemia - improved as of late s/p HD, K+ severely hemolyzed yesterday  (4)Anemia - ESRD related, s/p Retacrit   (5)Pulm - effusion on CT chest   (6)Neuro - CTH with new L occipital hemorrhagic stroke; BP goal to be 140-160/90.    RECOMMEND:  (1)HD today: 3 hrs, 3L UF   (2)Retacrit 10k units with HD  (3)Neuro/cardio follow up    Esme Cleary DNP  Health system  [phone]         RENAL ATTENDING NOTE  Patient seen and examined on HD. Tolerating ~3L UF (albeit with low-normal BP).  Okay that she refused UF yesterday; no need for PUF tomorrow    Christoph Spear MD  Health system  [phone]

## 2023-09-13 NOTE — CONSULT NOTE ADULT - ASSESSMENT
63F with history of Polycystic kidney disease c/b ESRD on HD (via R UE AVF), hx Renal tx 1990, Lymphoma in remission, Anemia with history of blood transfusions, and CVA x2 (with R eye hemianopsia and R facial droop) who presents to [facility] with complaints of SOB and chest pain after AMA'ing from [facility] where she was supposed to have an elective PCI reportedly.    Notably had a CVA at [facility] and was started on DAPT, now with c/f for possible punctate brain hemorrhage, but likely per neuro and can continue on DAPT.    Cardiology consulted for consideration for possible PCI given that was the plan at [facility].    Per primary team, she's had some intermittent chest pain.    Troponin flat x3 88 --> 94 --> 86  EKG show NSR, 1st degree block, no ischemic changes.  TTE with normal LV systolic function, normal RV function    Recommendations:  - Low concern for ACS at this time  - Continue [id number] daily given known CAD with distant PCI hx  - Can continue Brilinta per neuro for CVA indication  - C/w high dose statin  - C/w home meds as ordered   - Discussed with Dr. Guzman, no plan for PCI here unless urgently indicated, ideally will follow-up at [facility] for elective PCI      Note incomplete until cosigned by attending.    Ventura Kaur, PGY-4  Cardiology Fellow    For all new consults  [email or web address]  Login: [email or web address]

## 2023-09-13 NOTE — CONSULT NOTE ADULT - ATTENDING COMMENTS
Pt prefers to transfer her cardiology care to [facility].  She is currently without evidence of active ischemia or decompensated heart failure.  Given recent CVA and bleeding issues, will need a multidisciplinary approach to optimal timing after a thorough review of her records, which are not immediately available. She is at increased risk of additional strokes and at risk of additional bleeding. In the absence of active cardiac sx may be able to arrange PCI as outpt if H/H remain stable on DAPT.
I saw and examined the patient with Dr. Beal.   Patient endorses new stroke symptoms while she was admitted to [facility] for cardiac catheterization.   Says she experienced a stroke, intraoperatively, and has R vision loss.   She had a stroke about 9 months ago, with residual R sided weakness.   At baseline, she can stand and walk, and cares for ADL's independently.     On exam:   GEN: NAD  CV: RRR, S1, S2, symmetric pulses.   PULM: CTAB  GI: soft, non tender  EXTREM: no CCE  HEENT: atraumatic.   NEURO:   Awake, alert, oriented to month, year, hospital, normal naming, repetition.  Speaking fluently.    Pupils 3mm, sluggishly reactive, symmetric, R eye has superior quadrantanopsia, L eye has full VF's, full EOM, no nystagmus, no facial weakness, no dysarthria, tongue midline, palate symmetric.   MOTOR: normal bulk and tone, R arm with 4/5 , biceps, and deltoids, L arm 4+/5 , biceps, deltoids, R leg 4/5 hip flexion, and knee extension, L leg 4+  SENSORY: symmetric light touch upper and lower extremities.   COORDINATION: normal FNF  REFLEXES: 1+ BB, Br, triceps, patellar, trace achilles, R>L.     AP: 63 year old woman with prior stroke in mae, residual R sided weakness, CAD, ESRD on HD, presenting with vision loss, acute onset after cardiac catheterization at OSH.  On exam, has monocular R superior quadrant vision loss, and R sided weakness from chronic stroke.  Reviewed imaging reports in her chart, shows a L PCA stroke, as well as occlusion of PCA on the L, and irrregularity of the PCA on the R.    Now on ASA/ticagrelor since the cardiac cath, unclear what the intended duration of the DAPT was for.      -follow up the echo  -continue on ASA/ticagrelor.  For neurology, 30 days would be sufficient, with transition to ASA alone.     -continue on statin  -PT/OT  -neurology to follow.  Please page or call with any acute neuro changes.

## 2023-09-13 NOTE — CHART NOTE - NSCHARTNOTEFT_GEN_A_CORE
Interval events: CTH 9/12/23 noting " There is heterogeneous density and lucency in the left occipital lobe in the left posterior cerebral artery distribution consistent with a hemorrhagic left posteriorcerebral artery infarct" Repeat CTH 9/13/23 "Hypoattenuation is again seen within the left PCA territory with either areas of gyral sparing or petechial hemorrhagic transformation in the infarct bed. Mild sulcal effacement is seen. There is no shift of the midline structures or herniation. There is no hydrocephalus. Overall appearance is unchanged in comparison to the prior CT study from yesterday." Imaging was reviewed with stroke attending Dr. Isaak Diego. There is low hemorrhagic burden (HI1) in setting of recent ischemic infarct. Discussed with primary team, indication for DAPT prior to CVA appeared to be a trial of DAPT in the setting of a gastrointestinal bleed to see if patient could tolerate DAPT prior to proceeding with stent.     Given low hemorrhagic burden, no neurological contraindication to DAPT. Risk of intraoperative stroke should be considered when offering and discussing any cardiac or other invasive procedures.

## 2023-09-13 NOTE — PROGRESS NOTE ADULT - SUBJECTIVE AND OBJECTIVE BOX
NEPHROLOGY     Patient seen and examined reports feeling better this morning, states that she felt mild headache, cp and sob overnight, at present denies any complaints, and breathing comfortably on room air, noted she refused PUF yesterday, currently in no acute distress.     MEDICATIONS  (STANDING):  amLODIPine   Tablet 10 milliGRAM(s) Oral daily  aspirin enteric coated 81 milliGRAM(s) Oral daily  atorvastatin 80 milliGRAM(s) Oral at bedtime  calcium acetate 2001 milliGRAM(s) Oral three times a day with meals  chlorhexidine 2% Cloths 1 Application(s) Topical daily  epoetin pastora (EPOGEN) Injectable 55715 Unit(s) IV Push <User Schedule>  escitalopram 10 milliGRAM(s) Oral daily  folic acid 1 milliGRAM(s) Oral daily  hydrALAZINE 100 milliGRAM(s) Oral every 8 hours  influenza   Vaccine 0.5 milliLiter(s) IntraMuscular once  isosorbide   mononitrate ER Tablet (IMDUR) 30 milliGRAM(s) Oral daily  losartan 100 milliGRAM(s) Oral daily  metoprolol succinate ER 25 milliGRAM(s) Oral at bedtime  metoprolol succinate ER 50 milliGRAM(s) Oral daily  mupirocin 2% Ointment 1 Application(s) Topical two times a day  Nephro-nehemiah 1 Tablet(s) Oral daily  OXcarbazepine 300 milliGRAM(s) Oral <User Schedule>  OXcarbazepine 150 milliGRAM(s) Oral <User Schedule>  pantoprazole    Tablet 40 milliGRAM(s) Oral two times a day  ticagrelor 90 milliGRAM(s) Oral every 12 hours    VITALS:  T(C): , Max: 36.7 (09-12-23 @ 12:00)  T(F): , Max: 98.1 (09-13-23 @ 00:00)  HR: 72 (09-13-23 @ 08:33)  BP: 158/51 (09-13-23 @ 08:33)  RR: 16 (09-13-23 @ 08:33)  SpO2: 100% (09-13-23 @ 08:33)    I and O's:    09-12 @ 07:01  -  09-13 @ 07:00  --------------------------------------------------------  IN: 668 mL / OUT: 0 mL / NET: 668 mL    PHYSICAL EXAM:  Constitutional: NAD, Alert  HEENT: NCAT, MMM  Neck: Supple, No JVD  Respiratory: diminished at bases   Cardiovascular: RRR s1s2, no m/r/g  Gastrointestinal: BS+, soft, NT/ND  Extremities: No peripheral edema b/l  Neurological: no focal deficits; strength grossly intact  Back: no CVAT b/l  Skin: No rashes, no nevi  Vascular Access: RUE AVF (+thrill)     LABS:                        8.9    3.32  )-----------( 220      ( 12 Sep 2023 07:37 )             27.1     09-12    136  |  93<L>  |  30<H>  ----------------------------<  76  5.4<H>   |  25  |  4.40<H>    Ca    9.8      12 Sep 2023 07:37  Phos  3.1     09-12  Mg     2.20     09-12    TPro  6.5  /  Alb  3.9  /  TBili  0.6  /  DBili  x   /  AST  68<H>  /  ALT  22  /  AlkPhos  149<H>  09-12    Urine Studies:  Urinalysis Basic - ( 12 Sep 2023 07:37 )    Color: x / Appearance: x / SG: x / pH: x  Gluc: 76 mg/dL / Ketone: x  / Bili: x / Urobili: x   Blood: x / Protein: x / Nitrite: x   Leuk Esterase: x / RBC: x / WBC x   Sq Epi: x / Non Sq Epi: x / Bacteria: x      RADIOLOGY & ADDITIONAL STUDIES:  rad< from: CT Head No Cont (09.13.23 @ 09:17) >    FINDINGS: Hypoattenuation is again seen within the left PCA territory   with either areas of gyral sparing or petechial hemorrhagic   transformation in the infarct bed. Mild sulcal effacement is seen. There   is no shift of the midline structures or herniation. There is no   hydrocephalus. Overall appearance is unchanged in comparison to the prior   CT study from yesterday.    A chronic lacunar infarct is seen within the left paramedian mae.    There is diffuse cerebral volume loss with prominence of the sulci,   fissures, and cisternal spaces which is normal for thepatient's age.   There is mild periventricular white matter hypoattenuation statistically   compatible with microvascular type changes given calcific atherosclerotic   disease of the intracranial arteries.    The paranasal sinuses and tympanomastoid cavities are clear. The   calvarium appears intact. There is evidence of bilateral cataract removal.    IMPRESSION: Stable follow-up CT examination when compared with 9/12/2023.    --- End of Report ---        MICHELLE VASQUEZ MD; Attending Radiologist  This document has been electronically signed. Sep 13 2023  9:20AM    < end of copied text >    PROCEDURE: Transthoracic echocardiogram with 2-D, M-Mode  and complete spectral and color flow Doppler.  INDICATION: Chest pain, unspecified (R07.9), Shortness of  breath (R06.02)  ------------------------------------------------------------------------  DIMENSIONS:  Dimensions:     Normal Values:  LA:     3.8 cm    2.0 - 4.0 cm  Ao:     3.1 cm    2.0 - 3.8 cm  SEPTUM: 1.0 cm    0.6 - 1.2 cm  PWT:    0.9 cm    0.6 - 1.1 cm  LVIDd:  4.6 cm    3.0 - 5.6 cm  LVIDs:  2.8 cm    1.8 - 4.0 cm  Derived Variables:  LVMI: 128 g/m2  RWT: 0.39  Fractional short: 39 %  ------------------------------------------------------------------------  OBSERVATIONS:  Mitral Valve: Normal mitral valve.  Aortic Root: Normal aortic root.  Aortic Valve: Normal trileaflet aortic valve.  Left Atrium: Severely dilated left atrium.  LA volume index  = 65 cc/m2.  Left Ventricle: Normal left ventricular systolic function.  No segmental wall motion abnormalities. Eccentric left  ventricular hypertrophy (dilated left ventricle with normal  relative wall thickness).  Right Heart: Normal right atrium. Normal right ventricular  size and function. Normal tricuspid valve. Normal pulmonic  valve.  Pericardium/PleuraNormal pericardium with trace pericardial  effusion.  ------------------------------------------------------------------------  CONCLUSIONS:  1. Normal trileaflet aortic valve.  2. Eccentric left ventricular hypertrophy (dilated left  ventricle with normal relative wall thickness).  3. Normal left ventricular systolic function. No segmental  wall motion abnormalities.  4. Normal right ventricular size and function.  ------------------------------------------------------------------------  Confirmed on  9/12/2023 - 18:36:21 by Vicente Kingsley M.D. RPVI    rad< from: CT Chest No Cont (09.12.23 @ 20:22) >    IMPRESSION:  Airway associated foci of groundglass opacity in bilateral lungs, likely   infectious. Right greater than left small pleural effusions  Mosaic attenuation of the lung parenchyma, suggestive of small airways   disease  Renal osteodystrophy    --- End of Report ---            BARBARA NUNES MD; Attending Radiologist  This document has been electronically signed. Sep 13 2023  9:27AM    < end of copied text >

## 2023-09-13 NOTE — PROGRESS NOTE ADULT - SUBJECTIVE AND OBJECTIVE BOX
Marisa Hernandez MD   Hospital Medicine  Teams preferred  Pager: [phone]    Patient is a 63y old  Female who presents with a chief complaint of SOB, Chest pain (13 Sep 2023 11:34)      INTERVAL HPI/OVERNIGHT EVENTS: Records obtained from OSH. In brief this is a 62 y/o F with ESRD from congenital renal disease, multivessel CAD s/p stent (remote) who recently left AMA from [facility]. Pt initially referred to [facility] for elective cardiac cath but then referred to ED given GI bleeding. Pt was seen by GI, underwent EGD/[place] and video capsule which noted no active bleeding. EGD: hiatal hernia, gastritis, duodenitis, gastric ulcers non-bleeding, colo: poor prep, non bleeding hemrrhoids, video capsule: gastritis with erosions, 3 punctate black spots, non bleeding. Was cleared by their GI to restart Brillinta and ASA as well as to continue with PPI daily until outpatient. Pt underwent cardiac cath with noted LAD 50% in stent restenosis, 20% distal LCX and 85-90% prox RCA, 75% mid RCA and 70% rPL. normal LVEF. with regards to her stroke, she was noted on MRI to have acute to subactue large L PCA tterritory infarct as well as small acute to subacute infarcts of the left thalamus and basal ganglia. MRA head/neck: occlusion of left PCA at P2 level. multifocal severe right PCA and severe stenosis of proximal right superior cerebellar artery. was recommended by their team to have DAPT, ILR and f/u with neuro optho.    Pt also seen by Psych, noted to have poor insight and was initiated on lexapro 10mg daily. with regards to her other medical conditions, she does have a hx of seizures on trileptal    Overnight no issues. had repeat routine CT head which noted some bleeding. Pt reports her CP got a little worse last night. this AM as well. no other concerns    MEDICATIONS  (STANDING):  amLODIPine   Tablet 10 milliGRAM(s) Oral daily  aspirin enteric coated 81 milliGRAM(s) Oral daily  atorvastatin 80 milliGRAM(s) Oral at bedtime  calcium acetate 2001 milliGRAM(s) Oral three times a day with meals  chlorhexidine 2% Cloths 1 Application(s) Topical daily  epoetin pastora (EPOGEN) Injectable 41417 Unit(s) IV Push <User Schedule>  escitalopram 10 milliGRAM(s) Oral daily  folic acid 1 milliGRAM(s) Oral daily  hydrALAZINE 100 milliGRAM(s) Oral every 8 hours  influenza   Vaccine 0.5 milliLiter(s) IntraMuscular once  isosorbide   mononitrate ER Tablet (IMDUR) 30 milliGRAM(s) Oral daily  losartan 100 milliGRAM(s) Oral daily  metoprolol succinate ER 50 milliGRAM(s) Oral daily  metoprolol succinate ER 25 milliGRAM(s) Oral at bedtime  mupirocin 2% Ointment 1 Application(s) Topical two times a day  Nephro-nehemiah 1 Tablet(s) Oral daily  OXcarbazepine 300 milliGRAM(s) Oral <User Schedule>  OXcarbazepine 150 milliGRAM(s) Oral <User Schedule>  pantoprazole    Tablet 40 milliGRAM(s) Oral two times a day  ticagrelor 90 milliGRAM(s) Oral every 12 hours    MEDICATIONS  (PRN):  acetaminophen     Tablet .. 650 milliGRAM(s) Oral every 6 hours PRN Temp greater or equal to 38C (100.4F), Mild Pain (1 - 3)  aluminum hydroxide/magnesium hydroxide/simethicone Suspension 30 milliLiter(s) Oral every 4 hours PRN Dyspepsia  melatonin 3 milliGRAM(s) Oral at bedtime PRN Insomnia  ondansetron Injectable 4 milliGRAM(s) IV Push every 8 hours PRN Nausea and/or Vomiting      Allergies    metoclopramide (Unknown)  Originally Entered as [Unknown] reaction to [Other][[place]] (Unknown)  latex (Unknown)  MAXALONE (Unknown)  adhesives (Unknown)  scopolamine (Unknown)    Intolerances        REVIEW OF SYSTEMS:  Please see interval HPI:    Vital Signs Last 24 Hrs  T(C): 36.4 (13 Sep 2023 12:28), Max: 36.7 (12 Sep 2023 19:56)  T(F): 97.6 (13 Sep 2023 12:28), Max: 98.1 (13 Sep 2023 00:00)  HR: 71 (13 Sep 2023 12:28) (71 - 89)  BP: 161/49 (13 Sep 2023 12:28) (148/64 - 189/53)  BP(mean): --  RR: 17 (13 Sep 2023 12:28) (16 - 18)  SpO2: 98% (13 Sep 2023 12:28) (97% - 100%)    Parameters below as of 13 Sep 2023 12:28  Patient On (Oxygen Delivery Method): room air      I&O's Detail    12 Sep 2023 07:01  -  13 Sep 2023 07:00  --------------------------------------------------------  IN:    Oral Fluid: 668 mL  Total IN: 668 mL    OUT:    Voided (mL): 0 mL  Total OUT: 0 mL    Total NET: 668 mL            PHYSICAL EXAM:  Vital Signs Last 24 Hrs  T(C): 36.4 (13 Sep 2023 12:28), Max: 36.7 (12 Sep 2023 19:56)  T(F): 97.6 (13 Sep 2023 12:28), Max: 98.1 (13 Sep 2023 00:00)  HR: 71 (13 Sep 2023 12:28) (71 - 89)  BP: 161/49 (13 Sep 2023 12:28) (148/64 - 189/53)  BP(mean): --  RR: 17 (13 Sep 2023 12:28) (16 - 18)  SpO2: 98% (13 Sep 2023 12:28) (97% - 100%)    Parameters below as of 13 Sep 2023 12:28  Patient On (Oxygen Delivery Method): room air      CONSTITUTIONAL: NAD,   ENMT: Moist oral mucosa,   RESPIRATORY: Normal respiratory effort; lungs are clear to auscultation bilaterally  CARDIOVASCULAR: Regular rate and rhythm, normal S1 and S2, no murmur/rub/gallop; No lower extremity edema;   ABDOMEN: Nontender to palpation, normoactive bowel sounds, no rebound/guarding; No hepatosplenomegaly  EXT: RUE fistula with +thrill  NEUROLOGY: alert, following commands A&O x 2-3, following commands  SKIN: No rashes; no palpable lesions      LABS:      Ca    9.8        12 Sep 2023 07:37      PT/INR - ( 11 Sep 2023 20:45 )   PT: 11.1 sec;   INR: 0.98 ratio         PTT - ( 11 Sep 2023 20:45 )  PTT:32.2 sec  CAPILLARY BLOOD GLUCOSE        BLOOD CULTURE    RADIOLOGY & ADDITIONAL TESTS:    Imaging Personally Reviewed:  [ ] YES   CT chest:  IMPRESSION:  Airway associated foci of groundglass opacity in bilateral lungs, likely   infectious. Right greater than left small pleural effusions  Mosaic attenuation of the lung parenchyma, suggestive of small airways   disease  Renal osteodystrophy  Consultant(s) Notes Reviewed:      Care Discussed with Consultants/Other Providers:

## 2023-09-14 ENCOUNTER — TRANSCRIPTION ENCOUNTER (OUTPATIENT)
Age: 63
End: 2023-09-14

## 2023-09-14 PROCEDURE — 99232 SBSQ HOSP IP/OBS MODERATE 35: CPT

## 2023-09-14 RX ADMIN — PANTOPRAZOLE SODIUM 40 MILLIGRAM(S): 20 TABLET, DELAYED RELEASE ORAL at 17:31

## 2023-09-14 RX ADMIN — LOSARTAN POTASSIUM 100 MILLIGRAM(S): 100 TABLET, FILM COATED ORAL at 05:27

## 2023-09-14 RX ADMIN — MUPIROCIN 1 APPLICATION(S): 20 OINTMENT TOPICAL at 05:28

## 2023-09-14 RX ADMIN — MUPIROCIN 1 APPLICATION(S): 20 OINTMENT TOPICAL at 17:31

## 2023-09-14 RX ADMIN — Medication 100 MILLIGRAM(S): at 05:26

## 2023-09-14 RX ADMIN — Medication 81 MILLIGRAM(S): at 13:38

## 2023-09-14 RX ADMIN — TICAGRELOR 90 MILLIGRAM(S): 90 TABLET ORAL at 05:29

## 2023-09-14 RX ADMIN — Medication 2001 MILLIGRAM(S): at 13:39

## 2023-09-14 RX ADMIN — Medication 100 MILLIGRAM(S): at 13:40

## 2023-09-14 RX ADMIN — ISOSORBIDE MONONITRATE 30 MILLIGRAM(S): 60 TABLET, EXTENDED RELEASE ORAL at 13:39

## 2023-09-14 RX ADMIN — Medication 100 MILLIGRAM(S): at 21:04

## 2023-09-14 RX ADMIN — Medication 2001 MILLIGRAM(S): at 09:00

## 2023-09-14 RX ADMIN — Medication 2001 MILLIGRAM(S): at 17:30

## 2023-09-14 RX ADMIN — Medication 25 MILLIGRAM(S): at 21:04

## 2023-09-14 RX ADMIN — OXCARBAZEPINE 150 MILLIGRAM(S): 300 TABLET, FILM COATED ORAL at 21:04

## 2023-09-14 RX ADMIN — PANTOPRAZOLE SODIUM 40 MILLIGRAM(S): 20 TABLET, DELAYED RELEASE ORAL at 05:27

## 2023-09-14 RX ADMIN — OXCARBAZEPINE 300 MILLIGRAM(S): 300 TABLET, FILM COATED ORAL at 09:00

## 2023-09-14 RX ADMIN — ATORVASTATIN CALCIUM 80 MILLIGRAM(S): 80 TABLET, FILM COATED ORAL at 21:04

## 2023-09-14 RX ADMIN — Medication 1 MILLIGRAM(S): at 13:39

## 2023-09-14 RX ADMIN — Medication 50 MILLIGRAM(S): at 05:28

## 2023-09-14 RX ADMIN — ESCITALOPRAM OXALATE 10 MILLIGRAM(S): 10 TABLET, FILM COATED ORAL at 13:38

## 2023-09-14 RX ADMIN — CHLORHEXIDINE GLUCONATE 1 APPLICATION(S): 213 SOLUTION TOPICAL at 13:40

## 2023-09-14 RX ADMIN — AMLODIPINE BESYLATE 10 MILLIGRAM(S): 2.5 TABLET ORAL at 05:27

## 2023-09-14 RX ADMIN — Medication 1 TABLET(S): at 13:40

## 2023-09-14 RX ADMIN — TICAGRELOR 90 MILLIGRAM(S): 90 TABLET ORAL at 17:31

## 2023-09-14 NOTE — PROGRESS NOTE ADULT - SUBJECTIVE AND OBJECTIVE BOX
NEPHROLOGY[facility] [phone]        Patient seen and examined she had HD yesterday 3L removed. She reports she ambulated today without issue, denies shortness of breath at this time.         MEDICATIONS  (STANDING):  amLODIPine   Tablet 10 milliGRAM(s) Oral daily  aspirin enteric coated 81 milliGRAM(s) Oral daily  atorvastatin 80 milliGRAM(s) Oral at bedtime  calcium acetate 2001 milliGRAM(s) Oral three times a day with meals  chlorhexidine 2% Cloths 1 Application(s) Topical daily  epoetin pastora (EPOGEN) Injectable 26318 Unit(s) IV Push <User Schedule>  escitalopram 10 milliGRAM(s) Oral daily  folic acid 1 milliGRAM(s) Oral daily  hydrALAZINE 100 milliGRAM(s) Oral every 8 hours  influenza   Vaccine 0.5 milliLiter(s) IntraMuscular once  isosorbide   mononitrate ER Tablet (IMDUR) 30 milliGRAM(s) Oral daily  losartan 100 milliGRAM(s) Oral daily  metoprolol succinate ER 25 milliGRAM(s) Oral at bedtime  metoprolol succinate ER 50 milliGRAM(s) Oral daily  mupirocin 2% Ointment 1 Application(s) Topical two times a day  Nephro-nehemiah 1 Tablet(s) Oral daily  OXcarbazepine 300 milliGRAM(s) Oral <User Schedule>  OXcarbazepine 150 milliGRAM(s) Oral <User Schedule>  pantoprazole    Tablet 40 milliGRAM(s) Oral two times a day  ticagrelor 90 milliGRAM(s) Oral every 12 hours      VITAL:  T(C): , Max: 37 (09-14-23 @ 12:19)  T(F): , Max: 98.6 (09-14-23 @ 12:19)  HR: 76 (09-14-23 @ 13:35)  BP: 150/49 (09-14-23 @ 13:35)  BP(mean): --  RR: 16 (09-14-23 @ 12:19)  SpO2: 95% (09-14-23 @ 12:19)  Wt(kg): --    I and O's:    09-13 @ 07:01  -  09-14 @ 07:00  --------------------------------------------------------  IN: 1040 mL / OUT: 3400 mL / NET: -2360 mL    09-14 @ 07:01  -  09-14 @ 14:41  --------------------------------------------------------  IN: 550 mL / OUT: 0 mL / NET: 550 mL          PHYSICAL EXAM:    Constitutional: NAD  Neck:  No JVD  Respiratory: CTAB/L  Cardiovascular: S1 and S2  Gastrointestinal: BS+, soft, NT/ND  Extremities: No peripheral edema  Neurological: A/O x 3, no focal deficits  Psychiatric: Normal mood, normal affect  : No Ochoa  Skin: No rashes  Access: RUE AVF (+thrill)    LABS:                        8.7    3.62  )-----------( 207      ( 13 Sep 2023 13:15 )             27.8     09-13    134<L>  |  94<L>  |  53<H>  ----------------------------<  120<H>  5.1   |  22  |  6.97<H>    Ca    9.9      13 Sep 2023 13:15    TPro  6.4  /  Alb  3.8  /  TBili  0.5  /  DBili  x   /  AST  21  /  ALT  13  /  AlkPhos  156<H>  09-13          Urine Studies:  Urinalysis Basic - ( 13 Sep 2023 13:15 )    Color: x / Appearance: x / SG: x / pH: x  Gluc: 120 mg/dL / Ketone: x  / Bili: x / Urobili: x   Blood: x / Protein: x / Nitrite: x   Leuk Esterase: x / RBC: x / WBC x   Sq Epi: x / Non Sq Epi: x / Bacteria: x        ASSESSMENT: 61 yo F hx polycystic kidney disease, ESRD (on HD MWF), renal transplant in 1990, lymphoma in remission, CVA  came with sob. nephrology  consulted  for emergent dialysis.     (1)ESRD - MWF - last dialyzed yesterday   (2)CV - hypervolemic, BP improving home antihypertensives resumed  (3)Hyperkalemia - improving   (4)Anemia - ESRD related, s/p Retacrit   (5)Pulm - effusion on CT chest   (6)Neuro - CTH with new L occipital hemorrhagic stroke; BP goal to be 140-160/90.    RECOMMEND:  (1)HD tomorrow: 3 hrs, 2.5kg UF   (2)Retacrit 10k units with HD  (3)Neuro/cardio follow up    Juana Bustamante NP  [facility]  [phone]        NEPHROLOGY[facility] [phone]        Patient seen and examined she had HD yesterday 3L removed. She reports she ambulated today without issue, denies shortness of breath at this time.         MEDICATIONS  (STANDING):  amLODIPine   Tablet 10 milliGRAM(s) Oral daily  aspirin enteric coated 81 milliGRAM(s) Oral daily  atorvastatin 80 milliGRAM(s) Oral at bedtime  calcium acetate 2001 milliGRAM(s) Oral three times a day with meals  chlorhexidine 2% Cloths 1 Application(s) Topical daily  epoetin pastora (EPOGEN) Injectable 06064 Unit(s) IV Push <User Schedule>  escitalopram 10 milliGRAM(s) Oral daily  folic acid 1 milliGRAM(s) Oral daily  hydrALAZINE 100 milliGRAM(s) Oral every 8 hours  influenza   Vaccine 0.5 milliLiter(s) IntraMuscular once  isosorbide   mononitrate ER Tablet (IMDUR) 30 milliGRAM(s) Oral daily  losartan 100 milliGRAM(s) Oral daily  metoprolol succinate ER 25 milliGRAM(s) Oral at bedtime  metoprolol succinate ER 50 milliGRAM(s) Oral daily  mupirocin 2% Ointment 1 Application(s) Topical two times a day  Nephro-nehemiah 1 Tablet(s) Oral daily  OXcarbazepine 300 milliGRAM(s) Oral <User Schedule>  OXcarbazepine 150 milliGRAM(s) Oral <User Schedule>  pantoprazole    Tablet 40 milliGRAM(s) Oral two times a day  ticagrelor 90 milliGRAM(s) Oral every 12 hours      VITAL:  T(C): , Max: 37 (09-14-23 @ 12:19)  T(F): , Max: 98.6 (09-14-23 @ 12:19)  HR: 76 (09-14-23 @ 13:35)  BP: 150/49 (09-14-23 @ 13:35)  BP(mean): --  RR: 16 (09-14-23 @ 12:19)  SpO2: 95% (09-14-23 @ 12:19)  Wt(kg): --    I and O's:    09-13 @ 07:01  -  09-14 @ 07:00  --------------------------------------------------------  IN: 1040 mL / OUT: 3400 mL / NET: -2360 mL    09-14 @ 07:01  -  09-14 @ 14:41  --------------------------------------------------------  IN: 550 mL / OUT: 0 mL / NET: 550 mL          PHYSICAL EXAM:    Constitutional: NAD  Neck:  No JVD  Respiratory: CTAB/L  Cardiovascular: S1 and S2  Gastrointestinal: BS+, soft, NT/ND  Extremities: No peripheral edema  Neurological: A/O x 3, no focal deficits  Psychiatric: Normal mood, normal affect  : No Ochoa  Skin: No rashes  Access: RUE AVF (+thrill)    LABS:                        8.7    3.62  )-----------( 207      ( 13 Sep 2023 13:15 )             27.8     09-13    134<L>  |  94<L>  |  53<H>  ----------------------------<  120<H>  5.1   |  22  |  6.97<H>    Ca    9.9      13 Sep 2023 13:15    TPro  6.4  /  Alb  3.8  /  TBili  0.5  /  DBili  x   /  AST  21  /  ALT  13  /  AlkPhos  156<H>  09-13          Urine Studies:  Urinalysis Basic - ( 13 Sep 2023 13:15 )    Color: x / Appearance: x / SG: x / pH: x  Gluc: 120 mg/dL / Ketone: x  / Bili: x / Urobili: x   Blood: x / Protein: x / Nitrite: x   Leuk Esterase: x / RBC: x / WBC x   Sq Epi: x / Non Sq Epi: x / Bacteria: x        ASSESSMENT: 63 yo F hx polycystic kidney disease, ESRD (on HD MWF), renal transplant in 1990, lymphoma in remission, CVA  came with sob. nephrology  consulted  for emergent dialysis.     (1)ESRD - MWF - last dialyzed yesterday   (2)CV - hypervolemic, BP improving home antihypertensives resumed  (3)Hyperkalemia - improving   (4)Anemia - ESRD related, s/p Retacrit   (5)Pulm - effusion on CT chest   (6)Neuro - CTH with new L occipital hemorrhagic stroke; BP goal to be 140-160/90.    RECOMMEND:  (1)HD tomorrow: 3 hrs, 2.5kg UF   (2)Retacrit 10k units with HD  (3)Neuro/cardio follow up    Juana Bustamante NP  [facility]  [phone]       RENAL ATTENDING NOTE  Patient seen and examined with NP. Agree with assessment and plan as above.    Christoph Spear MD  [facility]  [phone]

## 2023-09-14 NOTE — PROGRESS NOTE ADULT - SUBJECTIVE AND OBJECTIVE BOX
Cardiology Fellow Consult Progress Note    Subjective: no CP or SOB.    No acute events overnight. No significant tele events. ROS negative at the bedside.     REVIEW OF SYSTEMS:  All other review of systems is negative unless indicated above.    Physical Exam:  T(F): 98.3 (09-14), Max: 98.3 (09-14)  HR: 78 (09-14) (69 - 78)  BP: 164/56 (09-14) (108/56 - 191/78)  RR: 17 (09-14)  SpO2: 95% (09-14)  GENERAL: No acute distress, well-developed  CHEST/LUNG: +mild diffuse crackles throughout, no wheezing  HEART: Regular rate and rhythm; +ALEX  ABDOMEN: Soft, Nontender, Nondistended  EXTREMITIES: trace pitting edema b/l  NEUROLOGY: AAOx3, focal deficits from stroke  SKIN: Normal color, No rashes or lesions      Cardiovascular diagnostic testings: personally reviewed    Imaging diagnostic testings: personally reviewed    Labs: Personally reviewed                        8.7    3.62  )-----------( 207      ( 13 Sep 2023 13:15 )             27.8     09-13    134<L>  |  94<L>  |  53<H>  ----------------------------<  120<H>  5.1   |  22  |  6.97<H>    Ca    9.9      13 Sep 2023 13:15    TPro  6.4  /  Alb  3.8  /  TBili  0.5  /  DBili  x   /  AST  21  /  ALT  13  /  AlkPhos  156<H>  09-13        CARDIAC MARKERS ( 13 Sep 2023 13:15 )  93 ng/L / x     / x     / x     / x     / x      CARDIAC MARKERS ( 12 Sep 2023 07:37 )  86 ng/L / x     / x     / 160 U/L / x     / 1.8 ng/mL  CARDIAC MARKERS ( 11 Sep 2023 22:00 )  94 ng/L / x     / x     / x     / x     / x      CARDIAC MARKERS ( 11 Sep 2023 20:45 )  88 ng/L / x     / x     / x     / x     / x                  Thyroid Stimulating Hormone, Serum: 3.38 uIU/mL (09-12 @ 07:37)

## 2023-09-14 NOTE — DISCHARGE NOTE PROVIDER - NSFOLLOWUPCLINICS_GEN_ALL_ED_FT
Cardiology at [facility]  Cardiology  [address] [place] [id number]  Phone: [phone]  Fax:   Follow Up Time: 2 weeks

## 2023-09-14 NOTE — DISCHARGE NOTE PROVIDER - HOSPITAL COURSE
HPI: 63F with history of Polycystic kidney disease c/b ESRD on HD (via R UE AVF), hx Renal tx 1990, Lymphoma in remission, Anemia with history of blood transfusions, and CVA x2 (with R eye hemianopsia and R facial droop) who presents to [facility] with complaints of SOB and chest pain. Patient was recently hospitalized at [facility] for approximately 2 weeks and eventually left AMA on Sunday because she was frustrated with her care there. Patient states that she was requesting that they OSH limit her blood draws due to her fear of needles but they did not seem to limit them and she therefore got frustrated with her stay and left AMA on 9/10. States that she was unable to get her usual HD session re-initiated before leaving the hospital and was scheduled for a tuesday session. On Monday (1 day prior to admission) patient started to get SOB with chest pain. Said that the chest pain was mild, L sided, nonradiating, and severe in quality. Denied palpitations or syncope with her symptoms. Denied cough/sputum/hemoptysis. Denied fevers/chills. She went to an UCC due to her SOB and chest pain, there she was noted to be hypoxic to 83% on RA and was therefore sent to the ED for further evaluation. Currently, patient in HD, reports improvement in her SOB with HD though still not fully gone, states her chest pain has resolved. Patient also reports feeling depressed due to her recent hospitalization and frequent blood draws. Denies SI/HI, denies AVH. Denies other acute complaints.     Brief summary of [facility] course: Records obtained from OSH. In brief this is a 62 y/o F with ESRD from congenital renal disease, multivessel CAD s/p stent (remote) who recently left AMA from [facility]. Pt initially referred to [facility] for elective cardiac cath but then referred to ED given GI bleeding. Pt was seen by GI, underwent EGD/[place] and video capsule which noted no active bleeding. EGD: hiatal hernia, gastritis, duodenitis, gastric ulcers non-bleeding, colo: poor prep, non bleeding hemrrhoids, video capsule: gastritis with erosions, 3 punctate black spots, non bleeding. Was cleared by their GI to restart Brillinta and ASA as well as to continue with PPI daily until outpatient. Pt underwent cardiac cath with noted LAD 50% in stent restenosis, 20% distal LCX and 85-90% prox RCA, 75% mid RCA and 70% rPL. normal LVEF. with regards to her stroke, she was noted on MRI to have acute to subacute large L PCA territory infarct as well as small acute to subacute infarcts of the left thalamus and basal ganglia. MRA head/neck: occlusion of left PCA at P2 level. multifocal severe right PCA and severe stenosis of proximal right superior cerebellar artery. was recommended by their team to have DAPT, ILR and f/u with neuro optho.  Pt also seen by Psych, noted to have poor insight and was initiated on lexapro 10mg daily. with regards to her other medical conditions, she does have a hx of seizures on     #Acute hypoxic resp failure 2/2 to fluid overload from missed dialysis as well as chest pain: Pt notably hypoxic, hyperkalemic to 6.8 with CXr suggesting fluid overload. Pt underwent urgent dialysis on day of presentation and several more sessions and was weaned to room air with improvement of SOB. Renal consulted and managing along    # Recent acute/subacute L PCA territory infarct: Neuro was consulted and Pt had repeat CT head which noted "IMPRESSION: Age-appropriate involutional and ischemic gliotic changes. Hemorrhagic left occipital infarct. Left pontine infarct of undetermined age." Repeat CT head done 12 hrs later noted stability of findings. Per neurology no further intervention at this time, should follow up with stroke clinic as outpatient. Pt is cleared to continue with DAPT.    #Angina: Pt had serial EKGs and trops performed here which were stable. low concern for ACS. Cards consulted as Pt was planned for elective PCI at OSH. Given ongoing resolving CVA and Pt's stable cardiovascular disease, PCI was deferred at this time and Pt was recommended for f/u outpatient in order to plan PCI. Pt had repeat echo during this hospitalization which showed:   CONCLUSIONS Echo TTE:  1. Normal trileaflet aortic valve.  2. Eccentric left ventricular hypertrophy (dilated left  ventricle with normal relative wall thickness).  3. Normal left ventricular systolic function. No segmental  wall motion abnormalities.  4. Normal right ventricular size and function. HPI: 63F with history of Polycystic kidney disease c/b ESRD on HD (via R UE AVF), hx Renal tx 1990, Lymphoma in remission, Anemia with history of blood transfusions, and CVA x2 (with R eye hemianopsia and R facial droop) who presents to [facility] with complaints of SOB and chest pain. Patient was recently hospitalized at [facility] for approximately 2 weeks and eventually left AMA on Sunday because she was frustrated with her care there. Patient states that she was requesting that they OSH limit her blood draws due to her fear of needles but they did not seem to limit them and she therefore got frustrated with her stay and left AMA on 9/10. States that she was unable to get her usual HD session re-initiated before leaving the hospital and was scheduled for a tuesday session. On Monday (1 day prior to admission) patient started to get SOB with chest pain. Said that the chest pain was mild, L sided, nonradiating, and severe in quality. Denied palpitations or syncope with her symptoms. Denied cough/sputum/hemoptysis. Denied fevers/chills. She went to an UCC due to her SOB and chest pain, there she was noted to be hypoxic to 83% on RA and was therefore sent to the ED for further evaluation. Currently, patient in HD, reports improvement in her SOB with HD though still not fully gone, states her chest pain has resolved. Patient also reports feeling depressed due to her recent hospitalization and frequent blood draws. Denies SI/HI, denies AVH. Denies other acute complaints.     Brief summary of [facility] course: Records obtained from OSH. In brief this is a 62 y/o F with ESRD from congenital renal disease, multivessel CAD s/p stent (remote) who recently left AMA from [facility]. Pt initially referred to [facility] for elective cardiac cath but then referred to ED given GI bleeding. Pt was seen by GI, underwent EGD/[place] and video capsule which noted no active bleeding. EGD: hiatal hernia, gastritis, duodenitis, gastric ulcers non-bleeding, colo: poor prep, non bleeding hemrrhoids, video capsule: gastritis with erosions, 3 punctate black spots, non bleeding. Was cleared by their GI to restart Brillinta and ASA as well as to continue with PPI daily until outpatient. Pt underwent cardiac cath with noted LAD 50% in stent restenosis, 20% distal LCX and 85-90% prox RCA, 75% mid RCA and 70% rPL. normal LVEF. with regards to her stroke, she was noted on MRI to have acute to subacute large L PCA territory infarct as well as small acute to subacute infarcts of the left thalamus and basal ganglia. MRA head/neck: occlusion of left PCA at P2 level. multifocal severe right PCA and severe stenosis of proximal right superior cerebellar artery. was recommended by their team to have DAPT, ILR and f/u with neuro optho.  Pt also seen by Psych, noted to have poor insight and was initiated on lexapro 10mg daily. with regards to her other medical conditions, she does have a hx of seizures on     #Acute hypoxic resp failure 2/2 to fluid overload from missed dialysis as well as chest pain: Pt notably hypoxic, hyperkalemic to 6.8 with CXr suggesting fluid overload. Pt underwent urgent dialysis on day of presentation and several more sessions and was weaned to room air with improvement of SOB. Renal consulted and managing along    # Recent acute/subacute L PCA territory infarct: Neuro was consulted and Pt had repeat CT head which noted "IMPRESSION: Age-appropriate involutional and ischemic gliotic changes. Hemorrhagic left occipital infarct. Left pontine infarct of undetermined age." Repeat CT head done 12 hrs later noted stability of findings. Per neurology no further intervention at this time, should follow up with stroke clinic as outpatient. Pt is cleared to continue with DAPT.    #Angina: Pt had serial EKGs and trops performed here which were stable. low concern for ACS. Cards consulted as Pt was planned for elective PCI at OSH. Given ongoing resolving CVA and Pt's stable cardiovascular disease, PCI was deferred at this time and Pt was recommended for f/u outpatient in order to plan PCI. Pt had repeat echo during this hospitalization which showed:   CONCLUSIONS Echo TTE:  1. Normal trileaflet aortic valve.  2. Eccentric left ventricular hypertrophy (dilated left  ventricle with normal relative wall thickness).  3. Normal left ventricular systolic function. No segmental  wall motion abnormalities.  4. Normal right ventricular size and function.    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 Patient was recently hospitalized at [facility] for approximately 2 weeks and eventually left AMA on Sunday because she was frustrated with her care there. Patient states that she was requesting that they OSH limit her blood draws due to her fear of needles but they did not seem to limit them and she therefore got frustrated with her stay and left AMA on 9/10. States that she was unable to get her usual HD session re-initiated before leaving the hospital and was scheduled for a tuesday session. On Monday (1 day prior to admission) patient started to get SOB with chest pain. Said that the chest pain was mild, L sided, nonradiating, and severe in quality. Denied palpitations or syncope with her symptoms. Denied cough/sputum/hemoptysis. Denied fevers/chills. She went to an UCC due to her SOB and chest pain, there she was noted to be hypoxic to 83% on RA and was therefore sent to the ED for further evaluation. Currently, patient in HD, reports improvement in her SOB with HD though still not fully gone, states her chest pain has resolved. Patient also reports feeling depressed due to her recent hospitalization and frequent blood draws. Denies SI/HI, denies AVH. Denies other acute complaints.     Brief summary of [facility] course: Records obtained from OSH. In brief this is a 62 y/o F with ESRD from congenital renal disease, multivessel CAD s/p stent (remote) who recently left AMA from [facility]. Pt initially referred to [facility] for elective cardiac cath but then referred to ED given GI bleeding. Pt was seen by GI, underwent EGD/[place] and video capsule which noted no active bleeding. EGD: hiatal hernia, gastritis, duodenitis, gastric ulcers non-bleeding, colo: poor prep, non bleeding hemrrhoids, video capsule: gastritis with erosions, 3 punctate black spots, non bleeding. Was cleared by their GI to restart Brillinta and ASA as well as to continue with PPI daily until outpatient. Pt underwent cardiac cath with noted LAD 50% in stent restenosis, 20% distal LCX and 85-90% prox RCA, 75% mid RCA and 70% rPL. normal LVEF. with regards to her stroke, she was noted on MRI to have acute to subacute large L PCA territory infarct as well as small acute to subacute infarcts of the left thalamus and basal ganglia. MRA head/neck: occlusion of left PCA at P2 level. multifocal severe right PCA and severe stenosis of proximal right superior cerebellar artery. was recommended by their team to have DAPT, ILR and f/u with neuro optho.  Pt also seen by Psych, noted to have poor insight and was initiated on lexapro 10mg daily. with regards to her other medical conditions, she does have a hx of seizures on     #Acute hypoxic resp failure 2/2 to fluid overload from missed dialysis as well as chest pain: Pt notably hypoxic, hyperkalemic to 6.8 with CXr suggesting fluid overload. Pt underwent urgent dialysis on day of presentation and several more sessions and was weaned to room air with improvement of SOB. Renal consulted and managing along, no other recommendations.     # Recent acute/subacute L PCA territory infarct: Neuro was consulted and Pt had repeat CT head which noted "IMPRESSION: Age-appropriate involutional and ischemic gliotic changes. Hemorrhagic left occipital infarct. Left pontine infarct of undetermined age." Repeat CT head done 12 hrs later noted stability of findings. Per neurology no further intervention at this time, should follow up with stroke clinic as outpatient. Pt is cleared to continue with DAPT.    #Angina: Pt had serial EKGs and trops performed here which were stable. low concern for ACS. Cards consulted as Pt was planned for elective PCI at OSH. Given ongoing resolving CVA and Pt's stable cardiovascular disease, PCI was deferred at this time and Pt was recommended for f/u outpatient in order to plan PCI. Pt had repeat echo during this hospitalization which showed:   CONCLUSIONS Echo TTE:  1. Normal trileaflet aortic valve.  2. Eccentric left ventricular hypertrophy (dilated left  ventricle with normal relative wall thickness).  3. Normal left ventricular systolic function. No segmental  wall motion abnormalities.  4. Normal right ventricular size and function.    Dispo- home    >33 min have been spent in the care and coordination of this patient's care

## 2023-09-14 NOTE — SWALLOW BEDSIDE ASSESSMENT ADULT - ADDITIONAL RECOMMENDATIONS
This department to follow up as schedule permits to assess for diet tolerance. Medical team further advised to reconsult if there is a change in medical status and/or observed change in patient's tolerance of recommended PO diet. Monitor for any neurological changes that may impact oral intake given neuro work up.

## 2023-09-14 NOTE — SWALLOW BEDSIDE ASSESSMENT ADULT - ASR SWALLOW RECOMMEND DIAG
Consider Cinesophagram if concern of CT Chest findings is dysphagia related given documented concern for aspiration at MD's discretion/VFSS/MBS

## 2023-09-14 NOTE — DISCHARGE NOTE PROVIDER - NSDCCPCAREPLAN_GEN_ALL_CORE_FT
PRINCIPAL DISCHARGE DIAGNOSIS  Diagnosis: Pulmonary edema  Assessment and Plan of Treatment: Pulmonary edema or fluid in the lungs. This was because you had missed your dialysis session. We did dialysis here and gave you an extra session and your breathing improved significantly. Please follow up for your scheduled dialysis      SECONDARY DISCHARGE DIAGNOSES  Diagnosis: Chest pain  Assessment and Plan of Treatment: You have been following closely with your cardiologists for your chest pain. At the other hospital, you were determined to need another stent. you were see by cardiology here and they think likely you will also need another stent but want to wait a period of time to allow you to heal from your stent. Please follow up with cardiology in 1-2 weeks    Diagnosis: Acute cerebrovascular accident (CVA)  Assessment and Plan of Treatment: You developed another stroke while you were at [facility]. Neurology was called here to help manage that stroke (located in L PCA region with some bleed). We want you to continue taking aspirin and brillinta. Please follow up closely with the neurology team here in a couple of weeks as well. It will be important to monitor your blood pressure closely.    Diagnosis: Shortness of breath  Assessment and Plan of Treatment:

## 2023-09-14 NOTE — SWALLOW BEDSIDE ASSESSMENT ADULT - COMMENTS
Progress Note- Hospitalist 9/14: "This is a 63F with history as above who presents to the hospital with SOB and chest pain found to have acute hypoxic respiratory failure 2/2 to fluid overload from missed dialysis. Problem 1: Acute Respiratory Failure with Hypoxia Plan: ... -CT Chest for further evaluation-noted some ground glass suspicious for infection."    Neurology Note 9/12: "Impression: Right monocular field cut due to left posterior ischemia; etiology iatrogenic/cardioembolic; chronic right weakness due to left pontine dysfunction; etiology likely small vessel disease. ***addendum*** CVA is not the primary reason for this hospitalization"    CT Chest 9/12: "IMPRESSION: Airway associated foci of groundglass opacity in bilateral lungs, likely infectious. Right greater than left small pleural effusions. Mosaic attenuation of the lung parenchyma, suggestive of small airways disease Renal osteodystrophy."    Patient seen awake/alert and oriented state during clinical swallow evaluation this AM. Patient denies difficulty swallowing and states she eats regular food such as cereal, rice, chicken.

## 2023-09-14 NOTE — DISCHARGE NOTE PROVIDER - DETAILS OF MALNUTRITION DIAGNOSIS/DIAGNOSES
This patient has been assessed with a concern for Malnutrition and was treated during this hospitalization for the following Nutrition diagnosis/diagnoses:     -  09/12/2023: Moderate protein-calorie malnutrition   -  09/12/2023: Underweight (BMI < 19)

## 2023-09-14 NOTE — SWALLOW BEDSIDE ASSESSMENT ADULT - SWALLOW EVAL: DIAGNOSIS
1- Functional oral stage for regular solids and thin liquids marked by adequate oral containment, adequate bolus manipulation, adequate mastication, adequate anterior to posterior transfer, adequate oral clearance. 2- Functional pharyngeal stage for regular solids and thin liquids marked by initiation of the pharyngeal swallow with hyolaryngeal excursion upon palpation without evidence of impaired airway protection.

## 2023-09-14 NOTE — PROGRESS NOTE ADULT - ASSESSMENT
This is a 63F with history as above who presents to the hospital with SOB and chest pain found to have acute hypoxic respiratory failure 2/2 to fluid overload from missed dialysis

## 2023-09-14 NOTE — DISCHARGE NOTE PROVIDER - NSDCMRMEDTOKEN_GEN_ALL_CORE_FT
amLODIPine 10 mg oral tablet: 1 orally once a day  aspirin 81 mg oral tablet: orally once a day  calcium acetate 667 mg oral tablet: 3 tab(s) orally 3 times a day (with meals)  escitalopram 10 mg oral tablet: 1 tab(s) orally once a day  folic acid 0.4 mg oral tablet: 2.5 tab(s) orally once a day  hydrALAZINE 100 mg oral tablet: 1 orally 3 times a day  isosorbide mononitrate 30 mg oral tablet, extended release: 1 tab(s) orally once a day  Lipitor 80 mg oral tablet: 1 tab(s) orally once a day (at bedtime)  losartan 100 mg oral tablet: 1 orally once a day  metoprolol succinate 25 mg oral tablet, extended release: 1 tab(s) orally once a day (at bedtime)  metoprolol succinate 50 mg oral capsule, extended release: 1 orally once a day  nitroglycerin 0.4 mg sublingual tablet: 1 tab(s) sublingually every 5 minutes as needed for  chest pain  OXcarbazepine 300 mg oral tablet: 1 tab(s) orally once a day  Protonix 40 mg oral delayed release tablet: 1 tab(s) orally 2 times a day  ticagrelor 90 mg oral tablet: 1 tab(s) orally 2 times a day  Trileptal 150 mg oral tablet: 1 orally once a day (at bedtime)  Vitamin B Complex with C and Folic Acid oral tablet: 1 tab(s) orally once a day   amLODIPine 10 mg oral tablet: 1 orally once a day  aspirin 81 mg oral tablet: orally once a day  atorvastatin 80 mg oral tablet: 1 tab(s) orally once a day (at bedtime)  calcium acetate 667 mg oral tablet: 3 tab(s) orally 3 times a day (with meals)  escitalopram 10 mg oral tablet: 1 tab(s) orally once a day  folic acid 0.4 mg oral tablet: 2.5 tab(s) orally once a day  hydrALAZINE 100 mg oral tablet: 1 tab(s) orally every 8 hours  isosorbide mononitrate 30 mg oral tablet, extended release: 1 tab(s) orally once a day  losartan 100 mg oral tablet: 1 orally once a day  metoprolol succinate 25 mg oral tablet, extended release: 1 tab(s) orally once a day (at bedtime)  metoprolol succinate 50 mg oral capsule, extended release: 1 orally once a day  nitroglycerin 0.4 mg sublingual tablet: 1 tab(s) sublingually every 5 minutes as needed for  chest pain  OXcarbazepine 150 mg oral tablet: 1 tab(s) orally once a day take at 8pm  OXcarbazepine 300 mg oral tablet: 1 tab(s) orally once a day take at 8am  Protonix 40 mg oral delayed release tablet: 1 tab(s) orally 2 times a day  ticagrelor 90 mg oral tablet: 1 tab(s) orally 2 times a day  Vitamin B Complex with C and Folic Acid oral tablet: 1 tab(s) orally once a day

## 2023-09-14 NOTE — PROGRESS NOTE ADULT - ASSESSMENT
63F with history of Polycystic kidney disease c/b ESRD on HD (via R UE AVF), hx Renal tx 1990, Lymphoma in remission, Anemia with history of blood transfusions, and CVA x2 (with R eye hemianopsia and R facial droop) who presents to [facility] with complaints of SOB and chest pain after AMA'ing from [facility] where she was supposed to have an elective PCI reportedly.    Notably had a CVA at [facility] and was started on DAPT, now with c/f for possible punctate brain hemorrhage, but likely per neuro and can continue on DAPT.    Cardiology consulted for consideration for possible PCI given that was the plan at [facility].    Per primary team, she's had some intermittent chest pain.    Troponin flat x3 88 --> 94 --> 86  EKG show NSR, 1st degree block, no ischemic changes.  TTE with normal LV systolic function, normal RV function    Recommendations:  - Low concern for ACS at this time  - Continue [id number] daily given known CAD with distant PCI hx  - Can continue Brilinta per neuro for CVA indication  - C/w high dose statin  - C/w home meds as ordered   - Although patient prefers to transfer care here, no indication for urgent PCI and given other acute conditions (e.g. stroke), would defer elective PCI to outpatient setting.  - Can follow-up with cardiology clinic 1-2 weeks post-discharge      Note incomplete until cosigned by attending.    Ventura Kaur, PGY-4  Cardiology Fellow    For all new consults  [email or web address]  Login: [email or web address]

## 2023-09-14 NOTE — DISCHARGE NOTE PROVIDER - CARE PROVIDER_API CALL
Adrianna Marte  NP in Family Health  [address], [address]  [place] [address]  Phone: [phone]  Fax: [phone]  Follow Up Time: 2 weeks

## 2023-09-14 NOTE — PROGRESS NOTE ADULT - SUBJECTIVE AND OBJECTIVE BOX
Marisa Hernandez MD   [facility] Medicine  Teams preferred  Pager: [phone]    Patient is a 63y old  Female who presents with a chief complaint of SOB, Chest pain (14 Sep 2023 08:31)      INTERVAL HPI/OVERNIGHT EVENTS: no issues overnight. CP improved, 3/10. SOB resolved. eager to possibly go home tomorrow. wants to transfer care to [facility].     MEDICATIONS  (STANDING):  amLODIPine   Tablet 10 milliGRAM(s) Oral daily  aspirin enteric coated 81 milliGRAM(s) Oral daily  atorvastatin 80 milliGRAM(s) Oral at bedtime  calcium acetate 2001 milliGRAM(s) Oral three times a day with meals  chlorhexidine 2% Cloths 1 Application(s) Topical daily  epoetin pastora (EPOGEN) Injectable 62372 Unit(s) IV Push <User Schedule>  escitalopram 10 milliGRAM(s) Oral daily  folic acid 1 milliGRAM(s) Oral daily  hydrALAZINE 100 milliGRAM(s) Oral every 8 hours  influenza   Vaccine 0.5 milliLiter(s) IntraMuscular once  isosorbide   mononitrate ER Tablet (IMDUR) 30 milliGRAM(s) Oral daily  losartan 100 milliGRAM(s) Oral daily  metoprolol succinate ER 50 milliGRAM(s) Oral daily  metoprolol succinate ER 25 milliGRAM(s) Oral at bedtime  mupirocin 2% Ointment 1 Application(s) Topical two times a day  Nephro-nehemiah 1 Tablet(s) Oral daily  OXcarbazepine 300 milliGRAM(s) Oral <User Schedule>  OXcarbazepine 150 milliGRAM(s) Oral <User Schedule>  pantoprazole    Tablet 40 milliGRAM(s) Oral two times a day  ticagrelor 90 milliGRAM(s) Oral every 12 hours    MEDICATIONS  (PRN):  acetaminophen     Tablet .. 650 milliGRAM(s) Oral every 6 hours PRN Temp greater or equal to 38C (100.4F), Mild Pain (1 - 3)  aluminum hydroxide/magnesium hydroxide/simethicone Suspension 30 milliLiter(s) Oral every 4 hours PRN Dyspepsia  hydrALAZINE Injectable 5 milliGRAM(s) IV Push every 8 hours PRN SBP >160  melatonin 3 milliGRAM(s) Oral at bedtime PRN Insomnia  ondansetron Injectable 4 milliGRAM(s) IV Push every 8 hours PRN Nausea and/or Vomiting      Allergies    metoclopramide (Unknown)  Originally Entered as [Unknown] reaction to [Other][[place]] (Unknown)  latex (Unknown)  MAXALONE (Unknown)  adhesives (Unknown)  scopolamine (Unknown)    Intolerances        REVIEW OF SYSTEMS:  Please see interval HPI:    Vital Signs Last 24 Hrs  T(C): 37 (14 Sep 2023 12:19), Max: 37 (14 Sep 2023 12:19)  T(F): 98.6 (14 Sep 2023 12:19), Max: 98.6 (14 Sep 2023 12:19)  HR: 72 (14 Sep 2023 12:19) (69 - 78)  BP: 146/47 (14 Sep 2023 12:19) (108/56 - 191/78)  BP(mean): --  RR: 16 (14 Sep 2023 12:19) (16 - 18)  SpO2: 95% (14 Sep 2023 12:19) (95% - 99%)    Parameters below as of 14 Sep 2023 12:19  Patient On (Oxygen Delivery Method): room air      I&O's Detail    13 Sep 2023 07:01  -  14 Sep 2023 07:00  --------------------------------------------------------  IN:    Oral Fluid: 640 mL    Other (mL): 400 mL  Total IN: 1040 mL    OUT:    Other (mL): 3400 mL    Voided (mL): 0 mL  Total OUT: 3400 mL    Total NET: -2360 mL      14 Sep 2023 07:01  -  14 Sep 2023 12:34  --------------------------------------------------------  IN:    Oral Fluid: 300 mL  Total IN: 300 mL    OUT:    Voided (mL): 0 mL  Total OUT: 0 mL    Total NET: 300 mL            PHYSICAL EXAM:  Vital Signs Last 24 Hrs  T(C): 37 (14 Sep 2023 12:19), Max: 37 (14 Sep 2023 12:19)  T(F): 98.6 (14 Sep 2023 12:19), Max: 98.6 (14 Sep 2023 12:19)  HR: 72 (14 Sep 2023 12:19) (69 - 78)  BP: 146/47 (14 Sep 2023 12:19) (108/56 - 191/78)  BP(mean): --  RR: 16 (14 Sep 2023 12:19) (16 - 18)  SpO2: 95% (14 Sep 2023 12:19) (95% - 99%)    Parameters below as of 14 Sep 2023 12:19  Patient On (Oxygen Delivery Method): room air      CONSTITUTIONAL: NAD,   ENMT: Moist oral mucosa,   RESPIRATORY: Normal respiratory effort; lungs are clear to auscultation bilaterally  CARDIOVASCULAR: Regular rate and rhythm, normal S1 and S2, no murmur/rub/gallop; No lower extremity edema;   ABDOMEN: Nontender to palpation, normoactive bowel sounds, no rebound/guarding; No hepatosplenomegaly  EXT: no edema b/l RUE-+Thrill noted  NEUROLOGY: alert, following commands  SKIN: No rashes; no palpable lesions      LABS:                        8.7    3.62  )-----------( 207      ( 13 Sep 2023 13:15 )             27.8     13 Sep 2023 13:15    134    |  94     |  53     ----------------------------<  120    5.1     |  22     |  6.97     Ca    9.9        13 Sep 2023 13:15    TPro  6.4    /  Alb  3.8    /  TBili  0.5    /  DBili  x      /  AST  21     /  ALT  13     /  AlkPhos  156    13 Sep 2023 13:15      CAPILLARY BLOOD GLUCOSE      POCT Blood Glucose.: 136 mg/dL (13 Sep 2023 16:04)    BLOOD CULTURE    RADIOLOGY & ADDITIONAL TESTS:    Imaging Personally Reviewed:  [ ] YES     Consultant(s) Notes Reviewed:      Care Discussed with Consultants/Other Providers:

## 2023-09-15 ENCOUNTER — TRANSCRIPTION ENCOUNTER (OUTPATIENT)
Age: 63
End: 2023-09-15

## 2023-09-15 VITALS
OXYGEN SATURATION: 98 % | RESPIRATION RATE: 18 BRPM | DIASTOLIC BLOOD PRESSURE: 44 MMHG | TEMPERATURE: 98 F | SYSTOLIC BLOOD PRESSURE: 134 MMHG | HEART RATE: 62 BPM

## 2023-09-15 DIAGNOSIS — R06.02 SHORTNESS OF BREATH: ICD-10-CM

## 2023-09-15 LAB
ALBUMIN SERPL ELPH-MCNC: 4 G/DL — SIGNIFICANT CHANGE UP (ref 3.3–5)
ALP SERPL-CCNC: 148 U/L — HIGH (ref 40–120)
ALT FLD-CCNC: 12 U/L — SIGNIFICANT CHANGE UP (ref 4–33)
ANION GAP SERPL CALC-SCNC: 18 MMOL/L — HIGH (ref 7–14)
AST SERPL-CCNC: 16 U/L — SIGNIFICANT CHANGE UP (ref 4–32)
BASOPHILS # BLD AUTO: 0.03 K/UL — SIGNIFICANT CHANGE UP (ref 0–0.2)
BASOPHILS NFR BLD AUTO: 0.8 % — SIGNIFICANT CHANGE UP (ref 0–2)
BILIRUB SERPL-MCNC: 0.4 MG/DL — SIGNIFICANT CHANGE UP (ref 0.2–1.2)
BUN SERPL-MCNC: 50 MG/DL — HIGH (ref 7–23)
CALCIUM SERPL-MCNC: 10.6 MG/DL — HIGH (ref 8.4–10.5)
CHLORIDE SERPL-SCNC: 90 MMOL/L — LOW (ref 98–107)
CO2 SERPL-SCNC: 27 MMOL/L — SIGNIFICANT CHANGE UP (ref 22–31)
CREAT SERPL-MCNC: 6.99 MG/DL — HIGH (ref 0.5–1.3)
EGFR: 6 ML/MIN/1.73M2 — LOW
EOSINOPHIL # BLD AUTO: 0.22 K/UL — SIGNIFICANT CHANGE UP (ref 0–0.5)
EOSINOPHIL NFR BLD AUTO: 5.9 % — SIGNIFICANT CHANGE UP (ref 0–6)
GLUCOSE SERPL-MCNC: 151 MG/DL — HIGH (ref 70–99)
HCT VFR BLD CALC: 28.6 % — LOW (ref 34.5–45)
HGB BLD-MCNC: 9.1 G/DL — LOW (ref 11.5–15.5)
IANC: 2.06 K/UL — SIGNIFICANT CHANGE UP (ref 1.8–7.4)
IMM GRANULOCYTES NFR BLD AUTO: 0.3 % — SIGNIFICANT CHANGE UP (ref 0–0.9)
LYMPHOCYTES # BLD AUTO: 0.93 K/UL — LOW (ref 1–3.3)
LYMPHOCYTES # BLD AUTO: 24.9 % — SIGNIFICANT CHANGE UP (ref 13–44)
MCHC RBC-ENTMCNC: 29.2 PG — SIGNIFICANT CHANGE UP (ref 27–34)
MCHC RBC-ENTMCNC: 31.8 GM/DL — LOW (ref 32–36)
MCV RBC AUTO: 91.7 FL — SIGNIFICANT CHANGE UP (ref 80–100)
MONOCYTES # BLD AUTO: 0.48 K/UL — SIGNIFICANT CHANGE UP (ref 0–0.9)
MONOCYTES NFR BLD AUTO: 12.9 % — SIGNIFICANT CHANGE UP (ref 2–14)
NEUTROPHILS # BLD AUTO: 2.06 K/UL — SIGNIFICANT CHANGE UP (ref 1.8–7.4)
NEUTROPHILS NFR BLD AUTO: 55.2 % — SIGNIFICANT CHANGE UP (ref 43–77)
NRBC # BLD: 0 /100 WBCS — SIGNIFICANT CHANGE UP (ref 0–0)
NRBC # FLD: 0 K/UL — SIGNIFICANT CHANGE UP (ref 0–0)
PLATELET # BLD AUTO: 233 K/UL — SIGNIFICANT CHANGE UP (ref 150–400)
POTASSIUM SERPL-MCNC: 4.7 MMOL/L — SIGNIFICANT CHANGE UP (ref 3.5–5.3)
POTASSIUM SERPL-SCNC: 4.7 MMOL/L — SIGNIFICANT CHANGE UP (ref 3.5–5.3)
PROT SERPL-MCNC: 6.3 G/DL — SIGNIFICANT CHANGE UP (ref 6–8.3)
RBC # BLD: 3.12 M/UL — LOW (ref 3.8–5.2)
RBC # FLD: 15.6 % — HIGH (ref 10.3–14.5)
SODIUM SERPL-SCNC: 135 MMOL/L — SIGNIFICANT CHANGE UP (ref 135–145)
WBC # BLD: 3.73 K/UL — LOW (ref 3.8–10.5)
WBC # FLD AUTO: 3.73 K/UL — LOW (ref 3.8–10.5)

## 2023-09-15 PROCEDURE — 99239 HOSP IP/OBS DSCHRG MGMT >30: CPT

## 2023-09-15 RX ORDER — OXCARBAZEPINE 300 MG/1
1 TABLET, FILM COATED ORAL
Qty: 30 | Refills: 0 | DISCHARGE
Start: 2023-09-15 | End: 2023-10-14

## 2023-09-15 RX ORDER — HYDRALAZINE HCL 50 MG
1 TABLET ORAL
Refills: 0 | DISCHARGE

## 2023-09-15 RX ORDER — OXCARBAZEPINE 300 MG/1
1 TABLET, FILM COATED ORAL
Refills: 0 | DISCHARGE

## 2023-09-15 RX ORDER — ESCITALOPRAM OXALATE 10 MG/1
1 TABLET, FILM COATED ORAL
Refills: 0 | DISCHARGE

## 2023-09-15 RX ORDER — OXCARBAZEPINE 300 MG/1
1 TABLET, FILM COATED ORAL
Qty: 30 | Refills: 0
Start: 2023-09-15 | End: 2023-10-14

## 2023-09-15 RX ORDER — LIDOCAINE AND PRILOCAINE CREAM 25; 25 MG/G; MG/G
1 CREAM TOPICAL
Refills: 0 | Status: DISCONTINUED | OUTPATIENT
Start: 2023-09-15 | End: 2023-09-15

## 2023-09-15 RX ORDER — HYDRALAZINE HCL 50 MG
1 TABLET ORAL
Qty: 90 | Refills: 0 | DISCHARGE
Start: 2023-09-15 | End: 2023-10-14

## 2023-09-15 RX ORDER — HYDRALAZINE HCL 50 MG
1 TABLET ORAL
Qty: 90 | Refills: 0
Start: 2023-09-15 | End: 2023-10-14

## 2023-09-15 RX ORDER — ATORVASTATIN CALCIUM 80 MG/1
1 TABLET, FILM COATED ORAL
Qty: 30 | Refills: 0
Start: 2023-09-15 | End: 2023-10-14

## 2023-09-15 RX ORDER — ESCITALOPRAM OXALATE 10 MG/1
1 TABLET, FILM COATED ORAL
Qty: 30 | Refills: 0
Start: 2023-09-15 | End: 2023-10-14

## 2023-09-15 RX ORDER — ATORVASTATIN CALCIUM 80 MG/1
1 TABLET, FILM COATED ORAL
Refills: 0 | DISCHARGE

## 2023-09-15 RX ADMIN — Medication 1 TABLET(S): at 15:00

## 2023-09-15 RX ADMIN — LOSARTAN POTASSIUM 100 MILLIGRAM(S): 100 TABLET, FILM COATED ORAL at 05:00

## 2023-09-15 RX ADMIN — ERYTHROPOIETIN 10000 UNIT(S): 10000 INJECTION, SOLUTION INTRAVENOUS; SUBCUTANEOUS at 12:27

## 2023-09-15 RX ADMIN — Medication 100 MILLIGRAM(S): at 05:00

## 2023-09-15 RX ADMIN — OXCARBAZEPINE 300 MILLIGRAM(S): 300 TABLET, FILM COATED ORAL at 09:14

## 2023-09-15 RX ADMIN — Medication 650 MILLIGRAM(S): at 09:14

## 2023-09-15 RX ADMIN — TICAGRELOR 90 MILLIGRAM(S): 90 TABLET ORAL at 05:00

## 2023-09-15 RX ADMIN — ESCITALOPRAM OXALATE 10 MILLIGRAM(S): 10 TABLET, FILM COATED ORAL at 15:00

## 2023-09-15 RX ADMIN — Medication 650 MILLIGRAM(S): at 10:00

## 2023-09-15 RX ADMIN — CHLORHEXIDINE GLUCONATE 1 APPLICATION(S): 213 SOLUTION TOPICAL at 15:00

## 2023-09-15 RX ADMIN — AMLODIPINE BESYLATE 10 MILLIGRAM(S): 2.5 TABLET ORAL at 05:00

## 2023-09-15 RX ADMIN — ISOSORBIDE MONONITRATE 30 MILLIGRAM(S): 60 TABLET, EXTENDED RELEASE ORAL at 14:59

## 2023-09-15 RX ADMIN — Medication 100 MILLIGRAM(S): at 15:00

## 2023-09-15 RX ADMIN — PANTOPRAZOLE SODIUM 40 MILLIGRAM(S): 20 TABLET, DELAYED RELEASE ORAL at 05:00

## 2023-09-15 RX ADMIN — Medication 2001 MILLIGRAM(S): at 09:15

## 2023-09-15 RX ADMIN — Medication 81 MILLIGRAM(S): at 15:00

## 2023-09-15 RX ADMIN — Medication 50 MILLIGRAM(S): at 05:00

## 2023-09-15 NOTE — DISCHARGE NOTE NURSING/CASE MANAGEMENT/SOCIAL WORK - FLU SEASON?
Yes...
Quality 431: Preventive Care And Screening: Unhealthy Alcohol Use - Screening: Patient screened for unhealthy alcohol use using a single question and scores less than 2 times per year
Detail Level: Detailed
Quality 226: Preventive Care And Screening: Tobacco Use: Screening And Cessation Intervention: Patient screened for tobacco and never smoked

## 2023-09-15 NOTE — PROGRESS NOTE ADULT - PROBLEM SELECTOR PLAN 8
DVT ppx - Likely SCDs if ok by neuro can do DVT proph  Diet - Minced and moist DASH/renal diet, S&S eval  Activity - OOB with assistance, increase as tolerated, PT/OT eval    Fall and aspiration precautions
DVT ppx - Likely SCDs if ok by neuro can do DVT proph  Diet - Minced and moist DASH/renal diet, S&S eval  Activity - OOB with assistance, increase as tolerated, PT/OT eval    Fall and aspiration precautions    stable for discharge

## 2023-09-15 NOTE — PROGRESS NOTE ADULT - PROBLEM SELECTOR PROBLEM 2
Acute heart failure, unspecified heart failure type

## 2023-09-15 NOTE — PROGRESS NOTE ADULT - PROBLEM SELECTOR PLAN 1
- Patient with acute hypoxic respiratory failure likely in setting of fluid overload due to missed HD session though given recent OSH admission and hypoxia on RA   -now on RA which Pt feeling much better back to baseline  - dc VQ scan and dopplers for now given clinical improvement with dialysis  - final read says perihilar haze more on the R than L with effusions consistent with CHF -> procal low and Pt afebrile  -CT Chest for further evaluation-noted some ground glass suspicious for infection. however Pt is afebrile, procal low, would hold on antibiotics  - b/l pleural effusions more likely 2/2 fluid overload, f/u CT Chest for further eval,
- Patient with acute hypoxic respiratory failure likely in setting of fluid overload due to missed HD session though given recent OSH admission and hypoxia on RA concern for possible PE  -now on RA which Pt feeling much better back to baseline  - dc VQ scan and dopplers for now given clinical improvement with dialysis  - final read says perihilar haze more on the R than L with effusions consistent with CHF -> procal low and Pt afebrile  -CT Chest for further evaluation-noted some ground glass suspicious for infection. however Pt is afebrile, procal low, would hold on antibiotics  - b/l pleural effusions more likely 2/2 fluid overload, f/u CT Chest for further eval,
- Patient with acute hypoxic respiratory failure likely in setting of fluid overload due to missed HD session though given recent OSH admission and hypoxia on RA concern for possible PE  -now on RA which Pt feeling much better.   - dc VQ scan and dopplers for now given clinical improvement with dialysis  - final read says perihilar haze more on the R than L with effusions consistent with CHF -> procal low and Pt afebrile  -CT Chest for further evaluation  - b/l pleural effusions more likely 2/2 fluid overload, f/u CT Chest for further eval,
- Patient with acute hypoxic respiratory failure likely in setting of fluid overload due to missed HD session though given recent OSH admission and hypoxia on RA concern for possible PE  -now on RA which Pt feeling much better.   - dc VQ scan and dopplers for now given clinical improvement with dialysis  - final read says perihilar haze more on the R than L with effusions consistent with CHF -> procal low and Pt afebrile  -CT Chest for further evaluation-noted some ground glass suspicious for infection. however Pt is afebrile, procal low, would hold on antibiotics  - b/l pleural effusions more likely 2/2 fluid overload, f/u CT Chest for further eval,

## 2023-09-15 NOTE — PROGRESS NOTE ADULT - PROBLEM SELECTOR PLAN 4
- Recent acute CVA, on examination has R sided hemianopsia, R facial droop, has b/l UE 4+/5 weakness but seems more effort related, 5/5 strength b/l LE, SILT b/l  - Passed dysphagia screen, will place on minced and moist diet, obtain S&S eval  - Neuro checks q4h  - c/w aspirin and brilinta (unclear if she is on DAPT for CVA or for cardiac issues)  - s/p CT head stable today compared to last night  - Neurology consulted, appreciate input, ok for DAPT  -no need for ILR at this time  -will f/u outpatient
- Recent acute CVA, on examination has R sided hemianopsia, R facial droop, has b/l UE 4+/5 weakness but seems more effort related, 5/5 strength b/l LE, SILT b/l  - Passed dysphagia screen, will place on minced and moist diet, obtain S&S eval  - Neuro checks q4h  - c/w aspirin and brilinta (unclear if she is on DAPT for CVA or for cardiac issues)  - s/p CT head stable today compared to last night  - Neurology consulted
- Recent acute CVA, on examination has R sided hemianopsia, R facial droop, has b/l UE 4+/5 weakness but seems more effort related, 5/5 strength b/l LE, SILT b/l  - Passed dysphagia screen, will place on minced and moist diet, obtain S&S eval  - Neuro checks q4h  - c/w aspirin and brilinta (unclear if she is on DAPT for CVA or for cardiac issues)  - Check CTH to assess evolution of CVA  - Neurology consulted
- Recent acute CVA, on examination has R sided hemianopsia, R facial droop, has b/l UE 4+/5 weakness but seems more effort related, 5/5 strength b/l LE, SILT b/l  - Passed dysphagia screen, will place on minced and moist diet, obtain S&S eval  - Neuro checks q4h  - c/w aspirin and brilinta (unclear if she is on DAPT for CVA or for cardiac issues)  - s/p CT head stable today compared to last night  - Neurology consulted, appreciate input, ok for DAPT  -no need for ILR at this time  -will f/u outpatient

## 2023-09-15 NOTE — PROGRESS NOTE ADULT - ASSESSMENT
ASSESSMENT: 61 yo F hx polycystic kidney disease, ESRD (on HD MWF), renal transplant in 1990, lymphoma in remission, CVA  came with sob. nephrology  consulted  for emergent dialysis.     (1)ESRD - MWF - last dialyzed Wednesday, due today  (2)CV - hypervolemic, BP improving/ stable -home antihypertensives resumed  (3)Hyperkalemia - improving   (4)Anemia - ESRD related, s/p Retacrit   (5)Pulm - effusion on CT chest   (6)Neuro - CTH with new L occipital hemorrhagic stroke; BP goal to be 140-160/90. ASSESSMENT: 61 yo F hx polycystic kidney disease, ESRD (on HD MWF), renal transplant in 1990, lymphoma in remission, CVA  came with sob. nephrology  consulted  for emergent dialysis.     (1)ESRD - MWF - last dialyzed Wednesday, due today  (2)CV - hypervolemic, BP improving/ stable -home antihypertensives resumed  (3)Hyperkalemia - improving   (4)Anemia - ESRD related, s/p Retacrit   (5)Pulm - effusion on CT chest   (6)Neuro - CTH with new L occipital hemorrhagic stroke; BP goal to be 140-160/90.    RECOMMEND:  (1)HD today: 3 hrs, 2.5kg UF   (2)Retacrit 10k units with HD  (3)Neuro/cardio follow up    Esme Cleary DNP  [facility]  [phone]    ASSESSMENT: 63 yo F hx polycystic kidney disease, ESRD (on HD MWF), renal transplant in 1990, lymphoma in remission, CVA  came with sob. nephrology  consulted  for emergent dialysis.     (1)ESRD - MWF - last dialyzed Wednesday, due today  (2)CV - hypervolemic, BP improving/ stable -home antihypertensives resumed  (3)Hyperkalemia - improving   (4)Anemia - ESRD related, s/p Retacrit   (5)Pulm - effusion on CT chest   (6)Neuro - CTH with new L occipital hemorrhagic stroke; BP goal to be 140-160/90.    RECOMMEND:  (1)HD today: 3 hrs, 2.5kg UF   (2)Retacrit 10k units with HD  (3)Neuro/cardio follow up    Esme Cleary DNP  [facility]  [phone]     RENAL ATTENDING NOTE  Patient seen and examined with NP. Agree with assessment and plan as above.    Christoph Spear MD  [facility]  [phone]

## 2023-09-15 NOTE — PROGRESS NOTE ADULT - PROBLEM SELECTOR PROBLEM 4
Acute cerebrovascular accident (CVA)

## 2023-09-15 NOTE — PROGRESS NOTE ADULT - PROVIDER SPECIALTY LIST ADULT
Hospitalist
Nephrology
Cardiology
Nephrology
Hospitalist

## 2023-09-15 NOTE — PROGRESS NOTE ADULT - PROBLEM SELECTOR PLAN 7
- confirmed meds on d/c summary
- confirmed meds on d/c summary
- Medications reconciled as per recent discharge notes from [facility] pharmacist emailed for medication reconciliation
- confirmed meds on d/c summary

## 2023-09-15 NOTE — PROGRESS NOTE ADULT - REASON FOR ADMISSION
SOB, Chest pain

## 2023-09-15 NOTE — PROGRESS NOTE ADULT - PROBLEM SELECTOR PLAN 3
- Had L sided chest pain with her SOB, non-radiating, chest pain now resolved  - Trops elevated but stable given ESRD, CK and CKMB without acute findings, EKG non-ischemic  - s/p TTE-LVEF preserved  - Monitor on telemetry  -appreciate Cards
- Had L sided chest pain with her SOB, non-radiating, chest pain now resolved  - Trops elevated but stable given ESRD, CK and CKMB without acute findings, EKG non-ischemic  - s/p TTE-LVEF preserved  - Monitor on telemetry  -appreciate Cards  -low concern for active ACS
- Had L sided chest pain with her SOB, non-radiating, chest pain now resolved  - Trops elevated but stable given ESRD, CK and CKMB without acute findings, EKG non-ischemic  - s/p TTE-LVEF preserved  - Monitor on telemetry  -appreciate Cards  -low concern for active ACS
- Had L sided chest pain with her SOB, non-radiating, chest pain now resolved  - Trops elevated but stable given ESRD, CK and CKMB without acute findings, EKG non-ischemic  - Check TTE  - Monitor on telemetry  - Cards eval in AM (as per primary team)  - See if we can obtain records of her cardiac angiogram from [facility] and clarify if any interventions were done

## 2023-09-15 NOTE — PROGRESS NOTE ADULT - SUBJECTIVE AND OBJECTIVE BOX
NEPHROLOGY     Patient seen and examined.    MEDICATIONS  (STANDING):  amLODIPine   Tablet 10 milliGRAM(s) Oral daily  aspirin enteric coated 81 milliGRAM(s) Oral daily  atorvastatin 80 milliGRAM(s) Oral at bedtime  calcium acetate 2001 milliGRAM(s) Oral three times a day with meals  chlorhexidine 2% Cloths 1 Application(s) Topical daily  epoetin pastora (EPOGEN) Injectable 23625 Unit(s) IV Push <User Schedule>  escitalopram 10 milliGRAM(s) Oral daily  folic acid 1 milliGRAM(s) Oral daily  hydrALAZINE 100 milliGRAM(s) Oral every 8 hours  influenza   Vaccine 0.5 milliLiter(s) IntraMuscular once  isosorbide   mononitrate ER Tablet (IMDUR) 30 milliGRAM(s) Oral daily  losartan 100 milliGRAM(s) Oral daily  metoprolol succinate ER 25 milliGRAM(s) Oral at bedtime  metoprolol succinate ER 50 milliGRAM(s) Oral daily  mupirocin 2% Ointment 1 Application(s) Topical two times a day  Nephro-nehemiah 1 Tablet(s) Oral daily  OXcarbazepine 300 milliGRAM(s) Oral <User Schedule>  OXcarbazepine 150 milliGRAM(s) Oral <User Schedule>  pantoprazole    Tablet 40 milliGRAM(s) Oral two times a day  ticagrelor 90 milliGRAM(s) Oral every 12 hours    VITALS:  T(C): , Max: 37.1 (09-14-23 @ 16:24)  T(F): , Max: 98.7 (09-14-23 @ 16:24)  HR: 66 (09-15-23 @ 04:35)  BP: 151/43 (09-15-23 @ 06:00)  RR: 18 (09-15-23 @ 04:35)  SpO2: 100% (09-15-23 @ 04:35)    I and O's:    09-14 @ 07:01  -  09-15 @ 07:00  --------------------------------------------------------  IN: 650 mL / OUT: 0 mL / NET: 650 mL    PHYSICAL EXAM:    Constitutional: NAD  Neck:  No JVD  Respiratory: CTAB/L  Cardiovascular: S1 and S2  Gastrointestinal: BS+, soft, NT/ND  Extremities: No peripheral edema  Neurological: A/O x 3, no focal deficits  Psychiatric: Normal mood, normal affect  : No Ochoa  Skin: No rashes  Access: RUE AVF (+thrill)    LABS:                        8.7    3.62  )-----------( 207      ( 13 Sep 2023 13:15 )             27.8     09-13    134<L>  |  94<L>  |  53<H>  ----------------------------<  120<H>  5.1   |  22  |  6.97<H>    Ca    9.9      13 Sep 2023 13:15    TPro  6.4  /  Alb  3.8  /  TBili  0.5  /  DBili  x   /  AST  21  /  ALT  13  /  AlkPhos  156<H>  09-13    Urine Studies:  Urinalysis Basic - ( 13 Sep 2023 13:15 )    Color: x / Appearance: x / SG: x / pH: x  Gluc: 120 mg/dL / Ketone: x  / Bili: x / Urobili: x   Blood: x / Protein: x / Nitrite: x   Leuk Esterase: x / RBC: x / WBC x   Sq Epi: x / Non Sq Epi: x / Bacteria: x   NEPHROLOGY     Patient seen and examined, no new complaints, denies pain, no sob, comfortable on room air, in no acute distress.     MEDICATIONS  (STANDING):  amLODIPine   Tablet 10 milliGRAM(s) Oral daily  aspirin enteric coated 81 milliGRAM(s) Oral daily  atorvastatin 80 milliGRAM(s) Oral at bedtime  calcium acetate 2001 milliGRAM(s) Oral three times a day with meals  chlorhexidine 2% Cloths 1 Application(s) Topical daily  epoetin pastora (EPOGEN) Injectable 33006 Unit(s) IV Push <User Schedule>  escitalopram 10 milliGRAM(s) Oral daily  folic acid 1 milliGRAM(s) Oral daily  hydrALAZINE 100 milliGRAM(s) Oral every 8 hours  influenza   Vaccine 0.5 milliLiter(s) IntraMuscular once  isosorbide   mononitrate ER Tablet (IMDUR) 30 milliGRAM(s) Oral daily  losartan 100 milliGRAM(s) Oral daily  metoprolol succinate ER 25 milliGRAM(s) Oral at bedtime  metoprolol succinate ER 50 milliGRAM(s) Oral daily  mupirocin 2% Ointment 1 Application(s) Topical two times a day  Nephro-nehemiah 1 Tablet(s) Oral daily  OXcarbazepine 300 milliGRAM(s) Oral <User Schedule>  OXcarbazepine 150 milliGRAM(s) Oral <User Schedule>  pantoprazole    Tablet 40 milliGRAM(s) Oral two times a day  ticagrelor 90 milliGRAM(s) Oral every 12 hours    VITALS:  T(C): , Max: 37.1 (09-14-23 @ 16:24)  T(F): , Max: 98.7 (09-14-23 @ 16:24)  HR: 66 (09-15-23 @ 04:35)  BP: 151/43 (09-15-23 @ 06:00)  RR: 18 (09-15-23 @ 04:35)  SpO2: 100% (09-15-23 @ 04:35)    I and O's:    09-14 @ 07:01  -  09-15 @ 07:00  --------------------------------------------------------  IN: 650 mL / OUT: 0 mL / NET: 650 mL    PHYSICAL EXAM:    Constitutional: NAD  Neck:  No JVD  Respiratory: CTAB/L  Cardiovascular: S1 and S2  Gastrointestinal: BS+, soft, NT/ND  Extremities: No peripheral edema  Neurological: A/O x 3, no focal deficits  Psychiatric: Normal mood, normal affect  : No Ochoa  Skin: No rashes  Access: RUE AVF (+thrill)    LABS:                        8.7    3.62  )-----------( 207      ( 13 Sep 2023 13:15 )             27.8     09-13    134<L>  |  94<L>  |  53<H>  ----------------------------<  120<H>  5.1   |  22  |  6.97<H>    Ca    9.9      13 Sep 2023 13:15    TPro  6.4  /  Alb  3.8  /  TBili  0.5  /  DBili  x   /  AST  21  /  ALT  13  /  AlkPhos  156<H>  09-13    Urine Studies:  Urinalysis Basic - ( 13 Sep 2023 13:15 )    Color: x / Appearance: x / SG: x / pH: x  Gluc: 120 mg/dL / Ketone: x  / Bili: x / Urobili: x   Blood: x / Protein: x / Nitrite: x   Leuk Esterase: x / RBC: x / WBC x   Sq Epi: x / Non Sq Epi: x / Bacteria: x

## 2023-09-15 NOTE — PROGRESS NOTE ADULT - NUTRITIONAL ASSESSMENT
This patient has been assessed with a concern for Malnutrition and has been determined to have a diagnosis/diagnoses of Moderate protein-calorie malnutrition and Underweight (BMI < 19).    This patient is being managed with:   Diet Renal Restrictions-  For patients receiving Renal Replacement - No Protein Restr No Conc K No Conc Phos Low Sodium  Entered: Sep 12 2023  1:45PM  

## 2023-09-15 NOTE — PROGRESS NOTE ADULT - PROBLEM SELECTOR PLAN 2
- Elevated proBNP with CXR concerning for CHF, patient reports having had a cardiac angiogram prior to her recent admission to [facility], unclear if she has fluid overload 2/2 missed HD session vs cardiac issues  - Trops elevated but stable given ESRD, CK and CKMB without acute findings, EKG non-ischemic  - Check TTE (has ALEX on cardiac auscultation)  - Monitor on telemetry  - s/p cards consult, appreciate input  - plan per cards is to allow Pt to heal from CVA and then schedule PCI outpatient. Pt to f/u with [facility]
- Elevated proBNP with CXR concerning for CHF, patient reports having had a cardiac angiogram prior to her recent admission to [facility], unclear if she has fluid overload 2/2 missed HD session vs cardiac issues  - Trops elevated but stable given ESRD, CK and CKMB without acute findings, EKG non-ischemic  - Check TTE (has ALEX on cardiac auscultation)  - Monitor on telemetry  - will consult cards tomorrow and in the interim will attempt to get records-EKG and trops are stable  - See if we can obtain records of her cardiac angiogram from [facility] and clarify if any interventions were done
- Elevated proBNP with CXR concerning for CHF, patient reports having had a cardiac angiogram prior to her recent admission to [facility], unclear if she has fluid overload 2/2 missed HD session vs cardiac issues  - Trops elevated but stable given ESRD, CK and CKMB without acute findings, EKG non-ischemic  - Check TTE (has ALEX on cardiac auscultation)  - Monitor on telemetry  - s/p cards consult, appreciate input  - plan per cards is to allow Pt to heal from CVA and then schedule PCI outpatient. Pt to f/u with [facility]
- Elevated proBNP with CXR concerning for CHF, patient reports having had a cardiac angiogram prior to her recent admission to [facility], unclear if she has fluid overload 2/2 missed HD session vs cardiac issues  - Trops elevated but stable given ESRD, CK and CKMB without acute findings, EKG non-ischemic  - Check TTE (has ALEX on cardiac auscultation)  - Monitor on telemetry  - will consult cards today   - repeat EKG  - will discuss with cards inpatient vs outpatient PCI

## 2023-09-15 NOTE — PROGRESS NOTE ADULT - PROBLEM SELECTOR PLAN 5
- Anemia likely multifactorial. acute GI bleeding now resolved. as well as ESRD  - On pantoprazole 40mg BID, will c/w. will plan to dc on PPI daily  - Monitor H/H  -records are in chart
- Anemia likely multifactorial. acute GI bleeding now resolved. as well as ESRD  - On pantoprazole 40mg BID, will c/w. will plan to dc on PPI daily  - Monitor H/H  - See if we can obtain her recent hospitalization records from [facility]
- Anemia likely 2/2 ESRD but patient also reports having had a GI work up with endoscopy and capsule endoscopy with findings of "dried blood" in her intestines as per patient  - On pantoprazole 40mg BID, will c/w  - Monitor H/H  - See if we can obtain her recent hospitalization records from [facility]
- Anemia likely multifactorial. acute GI bleeding now resolved. as well as ESRD  - On pantoprazole 40mg BID, will c/w. will plan to dc on PPI daily  - Monitor H/H  -records are in chart

## 2023-09-15 NOTE — PROGRESS NOTE ADULT - SUBJECTIVE AND OBJECTIVE BOX
Marisa Hernandez MD   [facility] Medicine  Teams preferred  Pager: [phone]    Patient is a 63y old  Female who presents with a chief complaint of SOB, Chest pain (15 Sep 2023 08:46)      INTERVAL HPI/OVERNIGHT EVENTS: No issues overnight. states CP improved. SOB resolved has mild SOB positionally. no other issues    MEDICATIONS  (STANDING):  amLODIPine   Tablet 10 milliGRAM(s) Oral daily  aspirin enteric coated 81 milliGRAM(s) Oral daily  atorvastatin 80 milliGRAM(s) Oral at bedtime  calcium acetate 2001 milliGRAM(s) Oral three times a day with meals  chlorhexidine 2% Cloths 1 Application(s) Topical daily  epoetin pastora (EPOGEN) Injectable 94012 Unit(s) IV Push <User Schedule>  escitalopram 10 milliGRAM(s) Oral daily  folic acid 1 milliGRAM(s) Oral daily  hydrALAZINE 100 milliGRAM(s) Oral every 8 hours  influenza   Vaccine 0.5 milliLiter(s) IntraMuscular once  isosorbide   mononitrate ER Tablet (IMDUR) 30 milliGRAM(s) Oral daily  lidocaine/prilocaine Cream 1 Application(s) Topical <User Schedule>  losartan 100 milliGRAM(s) Oral daily  metoprolol succinate ER 50 milliGRAM(s) Oral daily  metoprolol succinate ER 25 milliGRAM(s) Oral at bedtime  mupirocin 2% Ointment 1 Application(s) Topical two times a day  Nephro-nehemiah 1 Tablet(s) Oral daily  OXcarbazepine 300 milliGRAM(s) Oral <User Schedule>  OXcarbazepine 150 milliGRAM(s) Oral <User Schedule>  pantoprazole    Tablet 40 milliGRAM(s) Oral two times a day  ticagrelor 90 milliGRAM(s) Oral every 12 hours    MEDICATIONS  (PRN):  acetaminophen     Tablet .. 650 milliGRAM(s) Oral every 6 hours PRN Temp greater or equal to 38C (100.4F), Mild Pain (1 - 3)  aluminum hydroxide/magnesium hydroxide/simethicone Suspension 30 milliLiter(s) Oral every 4 hours PRN Dyspepsia  hydrALAZINE Injectable 5 milliGRAM(s) IV Push every 8 hours PRN SBP >160  melatonin 3 milliGRAM(s) Oral at bedtime PRN Insomnia  ondansetron Injectable 4 milliGRAM(s) IV Push every 8 hours PRN Nausea and/or Vomiting      Allergies    metoclopramide (Unknown)  Originally Entered as [Unknown] reaction to [Other][Buscopan] (Unknown)  latex (Unknown)  MAXALONE (Unknown)  adhesives (Unknown)  scopolamine (Unknown)    Intolerances        REVIEW OF SYSTEMS:  Please see interval HPI:    Vital Signs Last 24 Hrs  T(C): 36.4 (15 Sep 2023 16:45), Max: 36.9 (14 Sep 2023 19:51)  T(F): 97.5 (15 Sep 2023 16:45), Max: 98.4 (14 Sep 2023 19:51)  HR: 62 (15 Sep 2023 16:45) (60 - 71)  BP: 134/44 (15 Sep 2023 16:45) (126/50 - 170/48)  BP(mean): --  RR: 18 (15 Sep 2023 16:45) (16 - 18)  SpO2: 98% (15 Sep 2023 16:45) (96% - 100%)    Parameters below as of 15 Sep 2023 16:45  Patient On (Oxygen Delivery Method): room air      I&O's Detail    14 Sep 2023 07:01  -  15 Sep 2023 07:00  --------------------------------------------------------  IN:    Oral Fluid: 650 mL  Total IN: 650 mL    OUT:    Voided (mL): 0 mL  Total OUT: 0 mL    Total NET: 650 mL      15 Sep 2023 07:01  -  15 Sep 2023 17:11  --------------------------------------------------------  IN:    Other (mL): 400 mL  Total IN: 400 mL    OUT:    Other (mL): 2400 mL  Total OUT: 2400 mL    Total NET: -2000 mL            PHYSICAL EXAM:  Vital Signs Last 24 Hrs  T(C): 36.4 (15 Sep 2023 16:45), Max: 36.9 (14 Sep 2023 19:51)  T(F): 97.5 (15 Sep 2023 16:45), Max: 98.4 (14 Sep 2023 19:51)  HR: 62 (15 Sep 2023 16:45) (60 - 71)  BP: 134/44 (15 Sep 2023 16:45) (126/50 - 170/48)  BP(mean): --  RR: 18 (15 Sep 2023 16:45) (16 - 18)  SpO2: 98% (15 Sep 2023 16:45) (96% - 100%)    Parameters below as of 15 Sep 2023 16:45  Patient On (Oxygen Delivery Method): room air      CONSTITUTIONAL: NAD,   ENMT: Moist oral mucosa,   RESPIRATORY: Normal respiratory effort; lungs are clear to auscultation bilaterally  CARDIOVASCULAR: Regular rate and rhythm, normal S1 and S2, no murmur/rub/gallop; No lower extremity edema;   ABDOMEN: Nontender to palpation, normoactive bowel sounds, no rebound/guarding; No hepatosplenomegaly  EXT: right fistula-+thrill  NEUROLOGY: alert, following commands  SKIN: No rashes; no palpable lesions      LABS:                        9.1    3.73  )-----------( 233      ( 15 Sep 2023 11:00 )             28.6     15 Sep 2023 11:00    135    |  90     |  50     ----------------------------<  151    4.7     |  27     |  6.99     Ca    10.6       15 Sep 2023 11:00    TPro  6.3    /  Alb  4.0    /  TBili  0.4    /  DBili  x      /  AST  16     /  ALT  12     /  AlkPhos  148    15 Sep 2023 11:00      CAPILLARY BLOOD GLUCOSE        BLOOD CULTURE    RADIOLOGY & ADDITIONAL TESTS:    Imaging Personally Reviewed:  [ ] YES     Consultant(s) Notes Reviewed:      Care Discussed with Consultants/Other Providers:

## 2023-09-15 NOTE — PROGRESS NOTE ADULT - PROBLEM SELECTOR PLAN 6
- Renal evaluation appreciated  - Continue renal medications  - Fluid removal as per renal  - Monitor potassium
- Renal evaluation appreciated  - Continue renal medications  - Fluid removal as per renal  - Monitor potassium    will discuss with renal if ok for discharge tomorrow.
- Renal evaluation appreciated  - Continue renal medications  - Fluid removal as per renal  - Monitor potassium
- Renal evaluation appreciated  - Continue renal medications  - Fluid removal as per renal  - Monitor potassium    will discuss with renal if ok for discharge tomorrow.

## 2023-09-15 NOTE — DISCHARGE NOTE NURSING/CASE MANAGEMENT/SOCIAL WORK - PATIENT PORTAL LINK FT
You can access the FollowMyHealth Patient Portal offered by [facility] by registering at the following website: [email or web address]. By joining [facility]’s FollowMyHealth portal, you will also be able to view your health information using other applications (apps) compatible with our system.

## 2023-11-02 PROBLEM — I63.9 CEREBRAL INFARCTION, UNSPECIFIED: Chronic | Status: ACTIVE | Noted: 2023-09-12

## 2023-11-02 PROBLEM — F32.9 MAJOR DEPRESSIVE DISORDER, SINGLE EPISODE, UNSPECIFIED: Chronic | Status: ACTIVE | Noted: 2023-09-12

## 2023-11-09 ENCOUNTER — APPOINTMENT (OUTPATIENT)
Dept: NEUROLOGY | Facility: CLINIC | Age: 63
End: 2023-11-09
Payer: MEDICARE

## 2023-11-09 VITALS
WEIGHT: 92.59 LBS | SYSTOLIC BLOOD PRESSURE: 163 MMHG | DIASTOLIC BLOOD PRESSURE: 69 MMHG | HEART RATE: 66 BPM | BODY MASS INDEX: 17.48 KG/M2 | HEIGHT: 61 IN

## 2023-11-09 DIAGNOSIS — Z82.49 FAMILY HISTORY OF ISCHEMIC HEART DISEASE AND OTHER DISEASES OF THE CIRCULATORY SYSTEM: ICD-10-CM

## 2023-11-09 DIAGNOSIS — Z78.9 OTHER SPECIFIED HEALTH STATUS: ICD-10-CM

## 2023-11-09 PROCEDURE — 99205 OFFICE O/P NEW HI 60 MIN: CPT

## 2023-11-09 RX ORDER — FOLIC ACID 1 MG/1
1 TABLET ORAL
Refills: 0 | Status: ACTIVE | COMMUNITY

## 2023-11-09 RX ORDER — VITAMIN B COMPLEX
CAPSULE ORAL
Refills: 0 | Status: ACTIVE | COMMUNITY

## 2023-11-09 RX ORDER — METOPROLOL TARTRATE 75 MG/1
TABLET, FILM COATED ORAL
Refills: 0 | Status: DISCONTINUED | COMMUNITY
End: 2023-11-09

## 2023-11-09 RX ORDER — AMLODIPINE BESYLATE 5 MG/1
TABLET ORAL
Refills: 0 | Status: DISCONTINUED | COMMUNITY
End: 2023-11-09

## 2023-11-09 RX ORDER — LOSARTAN POTASSIUM 100 MG/1
TABLET, FILM COATED ORAL
Refills: 0 | Status: DISCONTINUED | COMMUNITY
End: 2023-11-09

## 2023-11-09 RX ORDER — ASCORBIC ACID 500 MG
TABLET ORAL
Refills: 0 | Status: ACTIVE | COMMUNITY

## 2023-11-09 RX ORDER — CLOPIDOGREL BISULFATE 75 MG/1
75 TABLET, FILM COATED ORAL DAILY
Qty: 90 | Refills: 3 | Status: DISCONTINUED | COMMUNITY
Start: 2017-10-23 | End: 2023-11-09

## 2023-11-09 RX ORDER — METOPROLOL TARTRATE 50 MG/1
50 TABLET, FILM COATED ORAL
Refills: 0 | Status: ACTIVE | COMMUNITY

## 2023-11-09 RX ORDER — NITROGLYCERIN 0.4 MG/1
0.4 TABLET SUBLINGUAL
Refills: 0 | Status: ACTIVE | COMMUNITY

## 2023-12-05 ENCOUNTER — APPOINTMENT (OUTPATIENT)
Dept: OPHTHALMOLOGY | Facility: CLINIC | Age: 63
End: 2023-12-05
Payer: MEDICARE

## 2023-12-05 ENCOUNTER — NON-APPOINTMENT (OUTPATIENT)
Age: 63
End: 2023-12-05

## 2023-12-05 PROCEDURE — 92133 CPTRZD OPH DX IMG PST SGM ON: CPT

## 2023-12-05 PROCEDURE — 99204 OFFICE O/P NEW MOD 45 MIN: CPT

## 2023-12-05 PROCEDURE — 92015 DETERMINE REFRACTIVE STATE: CPT | Mod: NC

## 2023-12-05 PROCEDURE — 92083 EXTENDED VISUAL FIELD XM: CPT

## 2023-12-20 RX ORDER — CALCIUM ACETATE 667 MG/1
667 CAPSULE ORAL 3 TIMES DAILY
Refills: 0 | Status: ACTIVE | COMMUNITY

## 2023-12-20 RX ORDER — ESCITALOPRAM OXALATE 10 MG/1
10 TABLET ORAL DAILY
Refills: 0 | Status: ACTIVE | COMMUNITY

## 2023-12-20 RX ORDER — METOPROLOL SUCCINATE 50 MG/1
50 TABLET, EXTENDED RELEASE ORAL
Refills: 0 | Status: ACTIVE | COMMUNITY

## 2023-12-20 RX ORDER — ATORVASTATIN CALCIUM 80 MG/1
80 TABLET, FILM COATED ORAL AT BEDTIME
Refills: 0 | Status: ACTIVE | COMMUNITY

## 2023-12-20 RX ORDER — AMLODIPINE BESYLATE 10 MG/1
10 TABLET ORAL DAILY
Refills: 0 | Status: ACTIVE | COMMUNITY

## 2023-12-20 RX ORDER — LOSARTAN POTASSIUM 100 MG/1
100 TABLET, FILM COATED ORAL DAILY
Refills: 0 | Status: ACTIVE | COMMUNITY

## 2023-12-20 RX ORDER — ISOSORBIDE MONONITRATE 30 MG/1
30 TABLET, EXTENDED RELEASE ORAL DAILY
Refills: 0 | Status: ACTIVE | COMMUNITY

## 2023-12-20 RX ORDER — OXCARBAZEPINE 300 MG/1
300 TABLET, FILM COATED ORAL DAILY
Refills: 0 | Status: ACTIVE | COMMUNITY

## 2023-12-20 RX ORDER — TICAGRELOR 90 MG/1
90 TABLET ORAL DAILY
Refills: 0 | Status: ACTIVE | COMMUNITY

## 2023-12-20 RX ORDER — METOPROLOL SUCCINATE 25 MG/1
25 TABLET, EXTENDED RELEASE ORAL
Refills: 0 | Status: ACTIVE | COMMUNITY

## 2023-12-20 RX ORDER — HYDRALAZINE HYDROCHLORIDE 100 MG/1
100 TABLET ORAL EVERY 8 HOURS
Refills: 0 | Status: ACTIVE | COMMUNITY

## 2023-12-20 RX ORDER — OXCARBAZEPINE 150 MG/1
150 TABLET, FILM COATED ORAL DAILY
Refills: 0 | Status: ACTIVE | COMMUNITY

## 2023-12-20 RX ORDER — FOLIC ACID 20 MG
CAPSULE ORAL
Refills: 0 | Status: ACTIVE | COMMUNITY

## 2023-12-20 RX ORDER — PANTOPRAZOLE SODIUM 40 MG/1
40 TABLET, DELAYED RELEASE ORAL
Refills: 0 | Status: ACTIVE | COMMUNITY

## 2024-01-02 ENCOUNTER — APPOINTMENT (OUTPATIENT)
Dept: CARDIOLOGY | Facility: CLINIC | Age: 64
End: 2024-01-02
Payer: MEDICARE

## 2024-01-02 ENCOUNTER — NON-APPOINTMENT (OUTPATIENT)
Age: 64
End: 2024-01-02

## 2024-01-02 VITALS
SYSTOLIC BLOOD PRESSURE: 127 MMHG | HEART RATE: 70 BPM | DIASTOLIC BLOOD PRESSURE: 45 MMHG | WEIGHT: 95 LBS | BODY MASS INDEX: 17.94 KG/M2 | HEIGHT: 61 IN | OXYGEN SATURATION: 96 %

## 2024-01-02 DIAGNOSIS — I25.119 ATHEROSCLEROTIC HEART DISEASE OF NATIVE CORONARY ARTERY WITH UNSPECIFIED ANGINA PECTORIS: ICD-10-CM

## 2024-01-02 PROCEDURE — 93000 ELECTROCARDIOGRAM COMPLETE: CPT

## 2024-01-02 PROCEDURE — 99215 OFFICE O/P EST HI 40 MIN: CPT

## 2024-01-02 NOTE — REASON FOR VISIT
[Symptom and Test Evaluation] : symptom and test evaluation [Hyperlipidemia] : hyperlipidemia [Hypertension] : hypertension [Coronary Artery Disease] : coronary artery disease [FreeTextEntry1] : 63 year old woman, h/o congenital renal disease on HD. Here with angina.  The patient was seen in May 2023 with CVA. Recovered function, then presented to [facility] in September 2023.  Underwent angiogram and was planned for PCI but had post procedure CVA without residual. Now here for f/u. Still with angina, occurs during HD and during exertion.   Cath with LAD ISR, high grade RCA lesion.   1. Angina, refractory to s/l NTG, during HD and during exertion, with established CAD. Will need cath. Pt made aware of the risk of recurrent CVA given h/o. 2. HTN. BP well controlled upon reevaluation. 3. HLD. Continue atorvastatin.

## 2024-01-02 NOTE — PHYSICAL EXAM
[Well Developed] : well developed [Well Nourished] : well nourished [No Acute Distress] : no acute distress [Normal Conjunctiva] : normal conjunctiva [Normal Venous Pressure] : normal venous pressure [No Carotid Bruit] : no carotid bruit [Normal S1, S2] : normal S1, S2 [No Rub] : no rub [No Gallop] : no gallop [Clear Lung Fields] : clear lung fields [Good Air Entry] : good air entry [No Respiratory Distress] : no respiratory distress  [Soft] : abdomen soft [Non Tender] : non-tender [No Masses/organomegaly] : no masses/organomegaly [Normal Bowel Sounds] : normal bowel sounds [Normal Gait] : normal gait [No Edema] : no edema [No Cyanosis] : no cyanosis [No Clubbing] : no clubbing [No Varicosities] : no varicosities [No Rash] : no rash [No Skin Lesions] : no skin lesions [Moves all extremities] : moves all extremities [No Focal Deficits] : no focal deficits [Normal Speech] : normal speech [Alert and Oriented] : alert and oriented [Normal memory] : normal memory [de-identified] : 2/6 ALEX

## 2024-01-02 NOTE — ASSESSMENT
[FreeTextEntry1] : 1. Angina, refractory to s/l NTG, during HD and during exertion, with established CAD. Will need cath. Pt made aware of the risk of recurrent CVA given h/o. 2. HTN. BP well controlled upon reevaluation. 3. HLD. Continue atorvastatin.

## 2024-01-10 ENCOUNTER — NON-APPOINTMENT (OUTPATIENT)
Age: 64
End: 2024-01-10

## 2024-01-16 ENCOUNTER — INPATIENT (INPATIENT)
Facility: HOSPITAL | Age: 64
LOS: 0 days | Discharge: HOME | End: 2024-01-17
Attending: INTERNAL MEDICINE | Admitting: INTERNAL MEDICINE
Payer: MEDICARE

## 2024-01-16 VITALS
OXYGEN SATURATION: 97 % | RESPIRATION RATE: 16 BRPM | DIASTOLIC BLOOD PRESSURE: 69 MMHG | HEART RATE: 78 BPM | TEMPERATURE: 99 F | SYSTOLIC BLOOD PRESSURE: 145 MMHG

## 2024-01-16 DIAGNOSIS — I25.10 ATHEROSCLEROTIC HEART DISEASE OF NATIVE CORONARY ARTERY WITHOUT ANGINA PECTORIS: ICD-10-CM

## 2024-01-16 LAB
ANION GAP SERPL CALC-SCNC: 17 MMOL/L — HIGH (ref 7–14)
BUN SERPL-MCNC: 47 MG/DL — HIGH (ref 7–23)
CALCIUM SERPL-MCNC: 8.4 MG/DL — SIGNIFICANT CHANGE UP (ref 8.4–10.5)
CHLORIDE SERPL-SCNC: 97 MMOL/L — LOW (ref 98–107)
CO2 SERPL-SCNC: 23 MMOL/L — SIGNIFICANT CHANGE UP (ref 22–31)
CREAT SERPL-MCNC: 5.36 MG/DL — HIGH (ref 0.5–1.3)
EGFR: 8 ML/MIN/1.73M2 — LOW
GLUCOSE SERPL-MCNC: 102 MG/DL — HIGH (ref 70–99)
HCT VFR BLD CALC: 35.6 % — SIGNIFICANT CHANGE UP (ref 34.5–45)
HGB BLD-MCNC: 10.7 G/DL — LOW (ref 11.5–15.5)
MCHC RBC-ENTMCNC: 29.6 PG — SIGNIFICANT CHANGE UP (ref 27–34)
MCHC RBC-ENTMCNC: 30.1 GM/DL — LOW (ref 32–36)
MCV RBC AUTO: 98.3 FL — SIGNIFICANT CHANGE UP (ref 80–100)
NRBC # BLD: 0 /100 WBCS — SIGNIFICANT CHANGE UP (ref 0–0)
NRBC # FLD: 0 K/UL — SIGNIFICANT CHANGE UP (ref 0–0)
PLATELET # BLD AUTO: 212 K/UL — SIGNIFICANT CHANGE UP (ref 150–400)
POTASSIUM SERPL-MCNC: 5.3 MMOL/L — SIGNIFICANT CHANGE UP (ref 3.5–5.3)
POTASSIUM SERPL-SCNC: 5.3 MMOL/L — SIGNIFICANT CHANGE UP (ref 3.5–5.3)
RBC # BLD: 3.62 M/UL — LOW (ref 3.8–5.2)
RBC # FLD: 18.6 % — HIGH (ref 10.3–14.5)
SODIUM SERPL-SCNC: 137 MMOL/L — SIGNIFICANT CHANGE UP (ref 135–145)
WBC # BLD: 3.45 K/UL — LOW (ref 3.8–10.5)
WBC # FLD AUTO: 3.45 K/UL — LOW (ref 3.8–10.5)

## 2024-01-16 PROCEDURE — 93458 L HRT ARTERY/VENTRICLE ANGIO: CPT | Mod: 26,59

## 2024-01-16 PROCEDURE — 92928 PRQ TCAT PLMT NTRAC ST 1 LES: CPT | Mod: RC

## 2024-01-16 PROCEDURE — 93010 ELECTROCARDIOGRAM REPORT: CPT

## 2024-01-16 RX ORDER — CALCIUM ACETATE 667 MG
2001 TABLET ORAL
Refills: 0 | Status: DISCONTINUED | OUTPATIENT
Start: 2024-01-16 | End: 2024-01-17

## 2024-01-16 RX ORDER — DARBEPOETIN ALFA IN POLYSORBAT 200MCG/0.4
40 PEN INJECTOR (ML) SUBCUTANEOUS ONCE
Refills: 0 | Status: COMPLETED | OUTPATIENT
Start: 2024-01-16 | End: 2024-01-17

## 2024-01-16 RX ORDER — ASPIRIN/CALCIUM CARB/MAGNESIUM 324 MG
81 TABLET ORAL DAILY
Refills: 0 | Status: DISCONTINUED | OUTPATIENT
Start: 2024-01-17 | End: 2024-01-17

## 2024-01-16 RX ORDER — SODIUM CHLORIDE 9 MG/ML
3 INJECTION INTRAMUSCULAR; INTRAVENOUS; SUBCUTANEOUS EVERY 8 HOURS
Refills: 0 | Status: DISCONTINUED | OUTPATIENT
Start: 2024-01-16 | End: 2024-01-17

## 2024-01-16 RX ORDER — AMLODIPINE BESYLATE 2.5 MG/1
10 TABLET ORAL DAILY
Refills: 0 | Status: DISCONTINUED | OUTPATIENT
Start: 2024-01-16 | End: 2024-01-17

## 2024-01-16 RX ORDER — METOPROLOL TARTRATE 50 MG
1 TABLET ORAL
Refills: 0 | DISCHARGE

## 2024-01-16 RX ORDER — ESCITALOPRAM OXALATE 10 MG/1
10 TABLET, FILM COATED ORAL DAILY
Refills: 0 | Status: DISCONTINUED | OUTPATIENT
Start: 2024-01-16 | End: 2024-01-17

## 2024-01-16 RX ORDER — LOSARTAN POTASSIUM 100 MG/1
100 TABLET, FILM COATED ORAL DAILY
Refills: 0 | Status: DISCONTINUED | OUTPATIENT
Start: 2024-01-16 | End: 2024-01-17

## 2024-01-16 RX ORDER — TICAGRELOR 90 MG/1
90 TABLET ORAL EVERY 12 HOURS
Refills: 0 | Status: DISCONTINUED | OUTPATIENT
Start: 2024-01-16 | End: 2024-01-17

## 2024-01-16 RX ORDER — OXCARBAZEPINE 300 MG/1
300 TABLET, FILM COATED ORAL
Refills: 0 | Status: DISCONTINUED | OUTPATIENT
Start: 2024-01-16 | End: 2024-01-17

## 2024-01-16 RX ORDER — PANTOPRAZOLE SODIUM 20 MG/1
40 TABLET, DELAYED RELEASE ORAL
Refills: 0 | Status: DISCONTINUED | OUTPATIENT
Start: 2024-01-16 | End: 2024-01-17

## 2024-01-16 RX ORDER — INFLUENZA VIRUS VACCINE 15; 15; 15; 15 UG/.5ML; UG/.5ML; UG/.5ML; UG/.5ML
0.5 SUSPENSION INTRAMUSCULAR ONCE
Refills: 0 | Status: DISCONTINUED | OUTPATIENT
Start: 2024-01-16 | End: 2024-01-17

## 2024-01-16 RX ORDER — LIDOCAINE AND PRILOCAINE CREAM 25; 25 MG/G; MG/G
1 CREAM TOPICAL ONCE
Refills: 0 | Status: COMPLETED | OUTPATIENT
Start: 2024-01-16 | End: 2024-01-17

## 2024-01-16 RX ORDER — HYDRALAZINE HCL 50 MG
100 TABLET ORAL DAILY
Refills: 0 | Status: DISCONTINUED | OUTPATIENT
Start: 2024-01-16 | End: 2024-01-17

## 2024-01-16 RX ORDER — METOPROLOL TARTRATE 50 MG
25 TABLET ORAL DAILY
Refills: 0 | Status: DISCONTINUED | OUTPATIENT
Start: 2024-01-16 | End: 2024-01-17

## 2024-01-16 RX ORDER — OXCARBAZEPINE 300 MG/1
150 TABLET, FILM COATED ORAL
Refills: 0 | Status: DISCONTINUED | OUTPATIENT
Start: 2024-01-16 | End: 2024-01-17

## 2024-01-16 RX ORDER — ATORVASTATIN CALCIUM 80 MG/1
80 TABLET, FILM COATED ORAL AT BEDTIME
Refills: 0 | Status: DISCONTINUED | OUTPATIENT
Start: 2024-01-16 | End: 2024-01-17

## 2024-01-16 RX ADMIN — OXCARBAZEPINE 150 MILLIGRAM(S): 300 TABLET, FILM COATED ORAL at 21:39

## 2024-01-16 RX ADMIN — Medication 2001 MILLIGRAM(S): at 17:44

## 2024-01-16 RX ADMIN — ATORVASTATIN CALCIUM 80 MILLIGRAM(S): 80 TABLET, FILM COATED ORAL at 21:39

## 2024-01-16 RX ADMIN — SODIUM CHLORIDE 3 MILLILITER(S): 9 INJECTION INTRAMUSCULAR; INTRAVENOUS; SUBCUTANEOUS at 16:18

## 2024-01-16 RX ADMIN — SODIUM CHLORIDE 3 MILLILITER(S): 9 INJECTION INTRAMUSCULAR; INTRAVENOUS; SUBCUTANEOUS at 21:39

## 2024-01-16 RX ADMIN — TICAGRELOR 90 MILLIGRAM(S): 90 TABLET ORAL at 17:44

## 2024-01-16 NOTE — H&P CARDIOLOGY - NSICDXFAMILYHX_GEN_ALL_CORE_FT
FAMILY HISTORY:  No pertinent family history in first degree relatives     FAMILY HISTORY:  Father  Still living? No  Family history of arrhythmia, Age at diagnosis: Age Unknown

## 2024-01-16 NOTE — H&P CARDIOLOGY - NSICDXPASTMEDICALHX_GEN_ALL_CORE_FT
PAST MEDICAL HISTORY:  CKD (chronic kidney disease)     CVA (cerebrovascular accident)     Depression, major     Dyslipidemia     ESRD (end stage renal disease) dialysis M, W, F    HTN (hypertension)      PAST MEDICAL HISTORY:  Anemia     Asthma     CVA (cerebrovascular accident)     Depression, major     Dyslipidemia     ESRD (end stage renal disease) dialysis M, W, F    HTN (hypertension)

## 2024-01-16 NOTE — CHART NOTE - NSCHARTNOTEFT_GEN_A_CORE
NIGHT [facility] MEDICINE COVERAGE    JL TOLENTINO   64yo s/p cardiac catheterization via Rt femoral access. Site is stable without hematoma, active bleeding, or swelling. Dressing is clean, dry, & intact. DP & PT pulses are palpable. Patient denies any pain, numbness, tingling, CP, SOB. VSS. Will continue to monitor.     T(C): 36.9 (01-16-24 @ 21:11), Max: 37.2 (01-16-24 @ 18:07)  HR: 75 (01-16-24 @ 21:11) (75 - 78)  BP: 139/62 (01-16-24 @ 21:11) (139/62 - 145/69)  RR: 16 (01-16-24 @ 21:11) (16 - 16)  SpO2: 96% (01-16-24 @ 21:11) (96% - 97%)      Jorge Martinez PA-C  Department of Medicine - Night [facility]  In House Pager [phone]

## 2024-01-16 NOTE — H&P CARDIOLOGY - REVIEW OF SYSTEMS
The patient denies chest pain, SOB, palpitations, dizziness, presyncope, syncope,  headache, visual disturbances, CVA, PE, DVT, LUIS ALBERTO, abdominal pain, N/V/D/C, hematochezia, melena, dysuria, hematuria, fever, chills.

## 2024-01-16 NOTE — CONSULT NOTE ADULT - SUBJECTIVE AND OBJECTIVE BOX
Patient is a 63y old  Female who presents with a chief complaint of     HPI:  63 y.o. female presents today for elective cardiac catheterization. The patient c/o L sided chest pain started 1 year ago, 5-6/10, doesn't radiate, mostly during dialysis, aggravate with exertion and at rest, resolve by itself and sometimes needs NTG s/l. She admits to SOB withe exertion, occasional palpitations. The patient denies dizziness, presyncope, syncope,  headache, visual disturbances, CVA, PE, DVT, LUIS ALBERTO, abdominal pain, N/V/D/C, hematochezia, melena, dysuria, hematuria, fever, chills. The patient was evaluated by a cardiologist. Due to patient's symptoms, the patient  was recommended to have cardiac catheterization. The patient denies any complaints at present.    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      History of myomectomy      Status post exploratory laparotomy      No significant past surgical history          MEDICATIONS  (STANDING):  amLODIPine   Tablet 10 milliGRAM(s) Oral daily  atorvastatin 80 milliGRAM(s) Oral at bedtime  calcium acetate 2001 milliGRAM(s) Oral three times a day with meals  escitalopram 10 milliGRAM(s) Oral daily  hydrALAZINE 100 milliGRAM(s) Oral daily  losartan 100 milliGRAM(s) Oral daily  metoprolol succinate ER 25 milliGRAM(s) Oral daily  Nephro-nehemiah 1 Tablet(s) Oral daily  OXcarbazepine 150 milliGRAM(s) Oral <User Schedule>  OXcarbazepine 300 milliGRAM(s) Oral <User Schedule>  pantoprazole    Tablet 40 milliGRAM(s) Oral before breakfast  sodium chloride 0.9% lock flush 3 milliLiter(s) IV Push every 8 hours  ticagrelor 90 milliGRAM(s) Oral every 12 hours      Allergies    adhesives (Unknown)  Originally Entered as [Unknown] reaction to [Other][Buscopan] (Unknown)  metoclopramide (Unknown)  latex (Unknown)  scopolamine (Unknown)  MAXALONE (Unknown)    Intolerances        SOCIAL HISTORY:  Denies ETOh,Smoking,     FAMILY HISTORY:  Family history of arrhythmia (Father)        REVIEW OF SYSTEMS:  CONSTITUTIONAL: No weakness, fevers or chills  EYES/ENT: No visual changes;  No vertigo or throat pain   NECK: No pain or stiffness  RESPIRATORY: No cough, wheezing, hemoptysis; No shortness of breath  CARDIOVASCULAR: No chest pain or palpitations  GASTROINTESTINAL: No abdominal or epigastric pain. No nausea, vomiting, or hematemesis; No diarrhea or constipation. No melena or hematochezia.  GENITOURINARY: No dysuria, frequency or hematuria  NEUROLOGICAL: No numbness or weakness  SKIN: No itching, burning, rashes, or lesions   All other review of systems is negative unless indicated above.    VITAL:  T(C): --  T(F): --  HR: --  BP: --  BP(mean): --  RR: --  SpO2: --  Wt(kg): --    PHYSICAL EXAM:  Constitutional: NAD, Alert  HEENT: NCAT, MMM  Neck: Supple, No JVD  Respiratory: CTA-b/l  Cardiovascular: RRR s1s2, no m/r/g  Gastrointestinal: BS+, soft, NT/ND  Extremities: No peripheral edema b/l  Neurological: no focal deficits; strength grossly intact  Back: no CVAT b/l  Skin: No rashes, no nevi  access   LABS:                        10.7   3.45  )-----------( 212      ( 2024 09:00 )             35.6     Na(137)/K(5.3)/Cl(97)/HCO3(23)/BUN(47)/Cr(5.36)Glu(102)/Ca(8.4)/Mg(--)/PO4(--)    - @ 09:00    Urinalysis Basic - ( 2024 09:00 )    Color: x / Appearance: x / SG: x / pH: x  Gluc: 102 mg/dL / Ketone: x  / Bili: x / Urobili: x   Blood: x / Protein: x / Nitrite: x   Leuk Esterase: x / RBC: x / WBC x   Sq Epi: x / Non Sq Epi: x / Bacteria: x            IMAGING:    ASSESSMENT:  he has PMH of hypertension, ESRD on dialysis MWF ,came with chest pain now, sp cardiac cath ,nephrology consulted for resumption of dialysis     ESRd   hypertension   volume   CAD   RECOMMEND:  - dialysis tomorrow am  -renal diet           Thank you for involving [facility] in this patient's care.    With warm regards    Maggy Cedillo  [facility]  Office: [phone]           Patient is a 63y old  Female who presents with a chief complaint of     HPI:  63 y.o. female presents today for elective cardiac catheterization. The patient c/o L sided chest pain started 1 year ago, 5-6/10, doesn't radiate, mostly during dialysis, aggravate with exertion and at rest, resolve by itself and sometimes needs NTG s/l. She admits to SOB withe exertion, occasional palpitations. The patient denies dizziness, presyncope, syncope,  headache, visual disturbances, CVA, PE, DVT, LUIS ALBERTO, abdominal pain, N/V/D/C, hematochezia, melena, dysuria, hematuria, fever, chills. The patient was evaluated by a cardiologist. Due to patient's symptoms, the patient  was recommended to have cardiac catheterization. The patient denies any complaints at present.    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      History of myomectomy      Status post exploratory laparotomy      No significant past surgical history          MEDICATIONS  (STANDING):  amLODIPine   Tablet 10 milliGRAM(s) Oral daily  atorvastatin 80 milliGRAM(s) Oral at bedtime  calcium acetate 2001 milliGRAM(s) Oral three times a day with meals  escitalopram 10 milliGRAM(s) Oral daily  hydrALAZINE 100 milliGRAM(s) Oral daily  losartan 100 milliGRAM(s) Oral daily  metoprolol succinate ER 25 milliGRAM(s) Oral daily  Nephro-nehemiah 1 Tablet(s) Oral daily  OXcarbazepine 150 milliGRAM(s) Oral <User Schedule>  OXcarbazepine 300 milliGRAM(s) Oral <User Schedule>  pantoprazole    Tablet 40 milliGRAM(s) Oral before breakfast  sodium chloride 0.9% lock flush 3 milliLiter(s) IV Push every 8 hours  ticagrelor 90 milliGRAM(s) Oral every 12 hours      Allergies    adhesives (Unknown)  Originally Entered as [Unknown] reaction to [Other][Buscopan] (Unknown)  metoclopramide (Unknown)  latex (Unknown)  scopolamine (Unknown)  MAXALONE (Unknown)    Intolerances        SOCIAL HISTORY:  Denies ETOh,Smoking,     FAMILY HISTORY:  Family history of arrhythmia (Father)        REVIEW OF SYSTEMS:  CONSTITUTIONAL: No weakness, fevers or chills  EYES/ENT: No visual changes;  No vertigo or throat pain   NECK: No pain or stiffness  RESPIRATORY: No cough, wheezing, hemoptysis; No shortness of breath  CARDIOVASCULAR: No chest pain or palpitations  GASTROINTESTINAL: No abdominal or epigastric pain. No nausea, vomiting, or hematemesis; No diarrhea or constipation. No melena or hematochezia.  GENITOURINARY: No dysuria, frequency or hematuria  NEUROLOGICAL: No numbness or weakness  SKIN: No itching, burning, rashes, or lesions   All other review of systems is negative unless indicated above.    VITAL:  T(C): --  T(F): --  HR: --  BP: --  BP(mean): --  RR: --  SpO2: --  Wt(kg): --    PHYSICAL EXAM:  Constitutional: NAD, Alert  HEENT: NCAT, MMM  Neck: Supple, No JVD  Respiratory: CTA-b/l  Cardiovascular: RRR s1s2, no m/r/g  Gastrointestinal: BS+, soft, NT/ND  Extremities: No peripheral edema b/l  Neurological: no focal deficits; strength grossly intact  Back: no CVAT b/l  Skin: No rashes, no nevi  access   LABS:                        10.7   3.45  )-----------( 212      ( 2024 09:00 )             35.6     Na(137)/K(5.3)/Cl(97)/HCO3(23)/BUN(47)/Cr(5.36)Glu(102)/Ca(8.4)/Mg(--)/PO4(--)    - @ 09:00    Urinalysis Basic - ( 2024 09:00 )    Color: x / Appearance: x / SG: x / pH: x  Gluc: 102 mg/dL / Ketone: x  / Bili: x / Urobili: x   Blood: x / Protein: x / Nitrite: x   Leuk Esterase: x / RBC: x / WBC x   Sq Epi: x / Non Sq Epi: x / Bacteria: x            IMAGING:    ASSESSMENT:  he has PMH of hypertension, ESRD on dialysis MWF ,came with chest pain now, sp cardiac cath ,nephrology consulted for resumption of dialysis     ESRd   hypertension   volume   CAD   RECOMMEND:  - dialysis tomorrow am  -renal diet           Thank you for involving [facility] in this patient's care.    With warm regards    Maggy Cedillo  [facility]  Office: [phone]           Patient is a 63y old  Female who presents with a chief complaint of     HPI:  63 y.o. female presents today for elective cardiac catheterization. The patient c/o L sided chest pain started 1 year ago, 5-6/10, doesn't radiate, mostly during dialysis, aggravate with exertion and at rest, resolve by itself and sometimes needs NTG s/l. She admits to SOB withe exertion, occasional palpitations. The patient denies dizziness, presyncope, syncope,  headache, visual disturbances, CVA, PE, DVT, LUIS ALBERTO, abdominal pain, N/V/D/C, hematochezia, melena, dysuria, hematuria, fever, chills. The patient was evaluated by a cardiologist. Due to patient's symptoms, the patient  was recommended to have cardiac catheterization. The patient denies any complaints at present.    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      History of myomectomy      Status post exploratory laparotomy      No significant past surgical history          MEDICATIONS  (STANDING):  amLODIPine   Tablet 10 milliGRAM(s) Oral daily  atorvastatin 80 milliGRAM(s) Oral at bedtime  calcium acetate 2001 milliGRAM(s) Oral three times a day with meals  escitalopram 10 milliGRAM(s) Oral daily  hydrALAZINE 100 milliGRAM(s) Oral daily  losartan 100 milliGRAM(s) Oral daily  metoprolol succinate ER 25 milliGRAM(s) Oral daily  Nephro-nehemiah 1 Tablet(s) Oral daily  OXcarbazepine 150 milliGRAM(s) Oral <User Schedule>  OXcarbazepine 300 milliGRAM(s) Oral <User Schedule>  pantoprazole    Tablet 40 milliGRAM(s) Oral before breakfast  sodium chloride 0.9% lock flush 3 milliLiter(s) IV Push every 8 hours  ticagrelor 90 milliGRAM(s) Oral every 12 hours      Allergies    adhesives (Unknown)  Originally Entered as [Unknown] reaction to [Other][Buscopan] (Unknown)  metoclopramide (Unknown)  latex (Unknown)  scopolamine (Unknown)  MAXALONE (Unknown)    Intolerances        SOCIAL HISTORY:  Denies ETOh,Smoking,     FAMILY HISTORY:  Family history of arrhythmia (Father)        REVIEW OF SYSTEMS:  CONSTITUTIONAL: No weakness, fevers or chills  EYES/ENT: No visual changes;  No vertigo or throat pain   NECK: No pain or stiffness  RESPIRATORY: No cough, wheezing, hemoptysis; No shortness of breath  CARDIOVASCULAR: No chest pain or palpitations  GASTROINTESTINAL: No abdominal or epigastric pain. No nausea, vomiting, or hematemesis; No diarrhea or constipation. No melena or hematochezia.  GENITOURINARY: No dysuria, frequency or hematuria  NEUROLOGICAL: No numbness or weakness  SKIN: No itching, burning, rashes, or lesions   All other review of systems is negative unless indicated above.    VITAL:  T(C): --  T(F): --  HR: --  BP: --  BP(mean): --  RR: --  SpO2: --  Wt(kg): --    PHYSICAL EXAM:  Constitutional: NAD, Alert  HEENT: NCAT, MMM  Neck: Supple, No JVD  Respiratory: CTA-b/l  Cardiovascular: RRR s1s2, no m/r/g  Gastrointestinal: BS+, soft, NT/ND  Extremities: No peripheral edema b/l  Neurological: no focal deficits; strength grossly intact  Back: no CVAT b/l  Skin: No rashes, no nevi  access :avf thrill +ve   LABS:                        10.7   3.45  )-----------( 212      ( 2024 09:00 )             35.6     Na(137)/K(5.3)/Cl(97)/HCO3(23)/BUN(47)/Cr(5.36)Glu(102)/Ca(8.4)/Mg(--)/PO4(--)     @ 09:00    Urinalysis Basic - ( 2024 09:00 )    Color: x / Appearance: x / SG: x / pH: x  Gluc: 102 mg/dL / Ketone: x  / Bili: x / Urobili: x   Blood: x / Protein: x / Nitrite: x   Leuk Esterase: x / RBC: x / WBC x   Sq Epi: x / Non Sq Epi: x / Bacteria: x            IMAGING:    ASSESSMENT:  he has PMH of hypertension, polycystic kidney disease , failed transplant , ESRD on dialysis MWF ,came with chest pain now, sp cardiac cath ,nephrology consulted for resumption of dialysis      ESRD - MWF for 3h , numbing cream b/f HD  at [facility] under Dr Barber care   hypertension --stable   volume--acceptable    CAD --SP cath RCA stent   RECOMMEND:  - dialysis tomorrow am ,   -aranesp 40 mcg with dialysis weekly   -renal diet if not NPO         consent in chart , dialysis unit aware   Thank you for involving [facility] in this patient's care.    With warm regards    Maggy Cedillo  [facility]  Office: [phone]           Patient is a 63y old  Female who presents with a chief complaint of     HPI:  63 y.o. female presents today for elective cardiac catheterization. The patient c/o L sided chest pain started 1 year ago, 5-6/10, doesn't radiate, mostly during dialysis, aggravate with exertion and at rest, resolve by itself and sometimes needs NTG s/l. She admits to SOB withe exertion, occasional palpitations. The patient denies dizziness, presyncope, syncope,  headache, visual disturbances, CVA, PE, DVT, LUIS ALBERTO, abdominal pain, N/V/D/C, hematochezia, melena, dysuria, hematuria, fever, chills. The patient was evaluated by a cardiologist. Due to patient's symptoms, the patient  was recommended to have cardiac catheterization. The patient denies any complaints at present.    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      History of myomectomy      Status post exploratory laparotomy      No significant past surgical history          MEDICATIONS  (STANDING):  amLODIPine   Tablet 10 milliGRAM(s) Oral daily  atorvastatin 80 milliGRAM(s) Oral at bedtime  calcium acetate 2001 milliGRAM(s) Oral three times a day with meals  escitalopram 10 milliGRAM(s) Oral daily  hydrALAZINE 100 milliGRAM(s) Oral daily  losartan 100 milliGRAM(s) Oral daily  metoprolol succinate ER 25 milliGRAM(s) Oral daily  Nephro-nehemiah 1 Tablet(s) Oral daily  OXcarbazepine 150 milliGRAM(s) Oral <User Schedule>  OXcarbazepine 300 milliGRAM(s) Oral <User Schedule>  pantoprazole    Tablet 40 milliGRAM(s) Oral before breakfast  sodium chloride 0.9% lock flush 3 milliLiter(s) IV Push every 8 hours  ticagrelor 90 milliGRAM(s) Oral every 12 hours      Allergies    adhesives (Unknown)  Originally Entered as [Unknown] reaction to [Other][Buscopan] (Unknown)  metoclopramide (Unknown)  latex (Unknown)  scopolamine (Unknown)  MAXALONE (Unknown)    Intolerances        SOCIAL HISTORY:  Denies ETOh,Smoking,     FAMILY HISTORY:  Family history of arrhythmia (Father)        REVIEW OF SYSTEMS:  CONSTITUTIONAL: No weakness, fevers or chills  EYES/ENT: No visual changes;  No vertigo or throat pain   NECK: No pain or stiffness  RESPIRATORY: No cough, wheezing, hemoptysis; No shortness of breath  CARDIOVASCULAR: No chest pain or palpitations  GASTROINTESTINAL: No abdominal or epigastric pain. No nausea, vomiting, or hematemesis; No diarrhea or constipation. No melena or hematochezia.  GENITOURINARY: No dysuria, frequency or hematuria  NEUROLOGICAL: No numbness or weakness  SKIN: No itching, burning, rashes, or lesions   All other review of systems is negative unless indicated above.    VITAL:  T(C): --  T(F): --  HR: --  BP: --  BP(mean): --  RR: --  SpO2: --  Wt(kg): --    PHYSICAL EXAM:  Constitutional: NAD, Alert  HEENT: NCAT, MMM  Neck: Supple, No JVD  Respiratory: CTA-b/l  Cardiovascular: RRR s1s2, no m/r/g  Gastrointestinal: BS+, soft, NT/ND  Extremities: No peripheral edema b/l  Neurological: no focal deficits; strength grossly intact  Back: no CVAT b/l  Skin: No rashes, no nevi  access :avf thrill +ve   LABS:                        10.7   3.45  )-----------( 212      ( 2024 09:00 )             35.6     Na(137)/K(5.3)/Cl(97)/HCO3(23)/BUN(47)/Cr(5.36)Glu(102)/Ca(8.4)/Mg(--)/PO4(--)     @ 09:00    Urinalysis Basic - ( 2024 09:00 )    Color: x / Appearance: x / SG: x / pH: x  Gluc: 102 mg/dL / Ketone: x  / Bili: x / Urobili: x   Blood: x / Protein: x / Nitrite: x   Leuk Esterase: x / RBC: x / WBC x   Sq Epi: x / Non Sq Epi: x / Bacteria: x            IMAGING:    ASSESSMENT:  he has PMH of hypertension, polycystic kidney disease , failed transplant , ESRD on dialysis MWF ,came with chest pain now, sp cardiac cath ,nephrology consulted for resumption of dialysis      ESRD - MWF for 3h , numbing cream b/f HD  at [facility] under Dr Barber care   hypertension --stable   volume--acceptable    CAD --SP cath RCA stent   RECOMMEND:  - dialysis tomorrow am ,   -aranesp 40 mcg with dialysis weekly   -renal diet if not NPO         consent in chart , dialysis unit aware   Thank you for involving [facility] in this patient's care.    With warm regards    Maggy Cedillo  [facility]  Office: [phone]

## 2024-01-16 NOTE — PATIENT PROFILE ADULT - FUNCTIONAL ASSESSMENT - BASIC MOBILITY 6.
3-calculated by average/Not able to assess (calculate score using [facility] averaging method)

## 2024-01-16 NOTE — H&P CARDIOLOGY - CARDIOVASCULAR
Can someone call him and let him know.  I didn't know about any meds being shipped to the office and he needs these meds.  Thanks.   detailed exam

## 2024-01-16 NOTE — CHART NOTE - NSCHARTNOTEFT_GEN_A_CORE
The patient presents today for elective cardiac cath, was noted to have prolonged  prior to cath and 280 post PCI.   Dr. Oh was made aware, recommended to decrease Metoprolol to 25 mg daily.

## 2024-01-16 NOTE — H&P CARDIOLOGY - HISTORY OF PRESENT ILLNESS
63 y.o. female presents today for elective cardiac catheterization  63 y.o. female presents today for elective cardiac catheterization. The patient c/o L sided chest pain started 1 year ago, 5-6/10, doesn't radiate, mostly during dialysis, aggravate with exertion and at rest, resolve by itself and sometimes needs NTG s/l. She admits to SOB withe exertion, occasional palpitations. The patient denies dizziness, presyncope, syncope,  headache, visual disturbances, CVA, PE, DVT, LUIS ALBERTO, abdominal pain, N/V/D/C, hematochezia, melena, dysuria, hematuria, fever, chills. The patient was evaluated by a cardiologist. Due to patient's symptoms, the patient  was recommended to have cardiac catheterization. The patient denies any complaints at present.

## 2024-01-17 ENCOUNTER — TRANSCRIPTION ENCOUNTER (OUTPATIENT)
Age: 64
End: 2024-01-17

## 2024-01-17 VITALS
HEART RATE: 72 BPM | SYSTOLIC BLOOD PRESSURE: 128 MMHG | TEMPERATURE: 98 F | OXYGEN SATURATION: 98 % | DIASTOLIC BLOOD PRESSURE: 54 MMHG | RESPIRATION RATE: 17 BRPM

## 2024-01-17 LAB
ALBUMIN SERPL ELPH-MCNC: 3.5 G/DL — SIGNIFICANT CHANGE UP (ref 3.3–5)
ALP SERPL-CCNC: 157 U/L — HIGH (ref 40–120)
ALT FLD-CCNC: 11 U/L — SIGNIFICANT CHANGE UP (ref 4–33)
ANION GAP SERPL CALC-SCNC: 10 MMOL/L — SIGNIFICANT CHANGE UP (ref 7–14)
ANION GAP SERPL CALC-SCNC: 16 MMOL/L — HIGH (ref 7–14)
AST SERPL-CCNC: 24 U/L — SIGNIFICANT CHANGE UP (ref 4–32)
BILIRUB SERPL-MCNC: 0.2 MG/DL — SIGNIFICANT CHANGE UP (ref 0.2–1.2)
BUN SERPL-MCNC: 20 MG/DL — SIGNIFICANT CHANGE UP (ref 7–23)
BUN SERPL-MCNC: 78 MG/DL — HIGH (ref 7–23)
CALCIUM SERPL-MCNC: 8.5 MG/DL — SIGNIFICANT CHANGE UP (ref 8.4–10.5)
CALCIUM SERPL-MCNC: 8.9 MG/DL — SIGNIFICANT CHANGE UP (ref 8.4–10.5)
CHLORIDE SERPL-SCNC: 95 MMOL/L — LOW (ref 98–107)
CHLORIDE SERPL-SCNC: 97 MMOL/L — LOW (ref 98–107)
CO2 SERPL-SCNC: 18 MMOL/L — LOW (ref 22–31)
CO2 SERPL-SCNC: 30 MMOL/L — SIGNIFICANT CHANGE UP (ref 22–31)
CREAT SERPL-MCNC: 2.93 MG/DL — HIGH (ref 0.5–1.3)
CREAT SERPL-MCNC: 7.18 MG/DL — HIGH (ref 0.5–1.3)
EGFR: 18 ML/MIN/1.73M2 — LOW
EGFR: 6 ML/MIN/1.73M2 — LOW
FLUAV AG NPH QL: SIGNIFICANT CHANGE UP
FLUBV AG NPH QL: SIGNIFICANT CHANGE UP
GLUCOSE SERPL-MCNC: 142 MG/DL — HIGH (ref 70–99)
GLUCOSE SERPL-MCNC: 94 MG/DL — SIGNIFICANT CHANGE UP (ref 70–99)
HCT VFR BLD CALC: 33 % — LOW (ref 34.5–45)
HGB BLD-MCNC: 10 G/DL — LOW (ref 11.5–15.5)
MAGNESIUM SERPL-MCNC: 2.5 MG/DL — SIGNIFICANT CHANGE UP (ref 1.6–2.6)
MCHC RBC-ENTMCNC: 29.3 PG — SIGNIFICANT CHANGE UP (ref 27–34)
MCHC RBC-ENTMCNC: 30.3 GM/DL — LOW (ref 32–36)
MCV RBC AUTO: 96.8 FL — SIGNIFICANT CHANGE UP (ref 80–100)
MRSA PCR RESULT.: SIGNIFICANT CHANGE UP
NRBC # BLD: 0 /100 WBCS — SIGNIFICANT CHANGE UP (ref 0–0)
NRBC # FLD: 0 K/UL — SIGNIFICANT CHANGE UP (ref 0–0)
PHOSPHATE SERPL-MCNC: 5.5 MG/DL — HIGH (ref 2.5–4.5)
PLATELET # BLD AUTO: 223 K/UL — SIGNIFICANT CHANGE UP (ref 150–400)
POTASSIUM SERPL-MCNC: 3.5 MMOL/L — SIGNIFICANT CHANGE UP (ref 3.5–5.3)
POTASSIUM SERPL-MCNC: 6.3 MMOL/L — CRITICAL HIGH (ref 3.5–5.3)
POTASSIUM SERPL-SCNC: 3.5 MMOL/L — SIGNIFICANT CHANGE UP (ref 3.5–5.3)
POTASSIUM SERPL-SCNC: 6.3 MMOL/L — CRITICAL HIGH (ref 3.5–5.3)
PROT SERPL-MCNC: 6.2 G/DL — SIGNIFICANT CHANGE UP (ref 6–8.3)
RBC # BLD: 3.41 M/UL — LOW (ref 3.8–5.2)
RBC # FLD: 18.5 % — HIGH (ref 10.3–14.5)
RSV RNA NPH QL NAA+NON-PROBE: SIGNIFICANT CHANGE UP
S AUREUS DNA NOSE QL NAA+PROBE: DETECTED
SARS-COV-2 RNA SPEC QL NAA+PROBE: SIGNIFICANT CHANGE UP
SODIUM SERPL-SCNC: 129 MMOL/L — LOW (ref 135–145)
SODIUM SERPL-SCNC: 137 MMOL/L — SIGNIFICANT CHANGE UP (ref 135–145)
WBC # BLD: 5.81 K/UL — SIGNIFICANT CHANGE UP (ref 3.8–10.5)
WBC # FLD AUTO: 5.81 K/UL — SIGNIFICANT CHANGE UP (ref 3.8–10.5)

## 2024-01-17 RX ORDER — METOPROLOL TARTRATE 50 MG
1 TABLET ORAL
Qty: 30 | Refills: 0
Start: 2024-01-17 | End: 2024-02-15

## 2024-01-17 RX ORDER — NITROGLYCERIN 6.5 MG
1 CAPSULE, EXTENDED RELEASE ORAL
Refills: 0 | DISCHARGE

## 2024-01-17 RX ORDER — ISOSORBIDE MONONITRATE 60 MG/1
1 TABLET, EXTENDED RELEASE ORAL
Refills: 0 | DISCHARGE

## 2024-01-17 RX ORDER — CHLORHEXIDINE GLUCONATE 213 G/1000ML
1 SOLUTION TOPICAL DAILY
Refills: 0 | Status: DISCONTINUED | OUTPATIENT
Start: 2024-01-17 | End: 2024-01-17

## 2024-01-17 RX ORDER — METOPROLOL TARTRATE 50 MG
1 TABLET ORAL
Refills: 0 | DISCHARGE

## 2024-01-17 RX ADMIN — Medication 25 MILLIGRAM(S): at 10:04

## 2024-01-17 RX ADMIN — Medication 40 MICROGRAM(S): at 08:35

## 2024-01-17 RX ADMIN — SODIUM CHLORIDE 3 MILLILITER(S): 9 INJECTION INTRAMUSCULAR; INTRAVENOUS; SUBCUTANEOUS at 05:20

## 2024-01-17 RX ADMIN — CHLORHEXIDINE GLUCONATE 1 APPLICATION(S): 213 SOLUTION TOPICAL at 12:47

## 2024-01-17 RX ADMIN — Medication 2001 MILLIGRAM(S): at 11:56

## 2024-01-17 RX ADMIN — Medication 100 MILLIGRAM(S): at 10:04

## 2024-01-17 RX ADMIN — ESCITALOPRAM OXALATE 10 MILLIGRAM(S): 10 TABLET, FILM COATED ORAL at 11:56

## 2024-01-17 RX ADMIN — LIDOCAINE AND PRILOCAINE CREAM 1 APPLICATION(S): 25; 25 CREAM TOPICAL at 04:41

## 2024-01-17 RX ADMIN — LOSARTAN POTASSIUM 100 MILLIGRAM(S): 100 TABLET, FILM COATED ORAL at 10:04

## 2024-01-17 RX ADMIN — Medication 81 MILLIGRAM(S): at 11:56

## 2024-01-17 RX ADMIN — SODIUM CHLORIDE 3 MILLILITER(S): 9 INJECTION INTRAMUSCULAR; INTRAVENOUS; SUBCUTANEOUS at 13:23

## 2024-01-17 RX ADMIN — Medication 1 TABLET(S): at 11:56

## 2024-01-17 RX ADMIN — AMLODIPINE BESYLATE 10 MILLIGRAM(S): 2.5 TABLET ORAL at 10:04

## 2024-01-17 RX ADMIN — OXCARBAZEPINE 300 MILLIGRAM(S): 300 TABLET, FILM COATED ORAL at 11:57

## 2024-01-17 NOTE — DISCHARGE NOTE PROVIDER - NSDCFUSCHEDAPPT_GEN_ALL_CORE_FT
[facility]  NEUROLOGY [address] [place] [address]  Scheduled Appointment: 01/18/2024

## 2024-01-17 NOTE — DISCHARGE NOTE PROVIDER - CARE PROVIDERS DIRECT ADDRESSES
[email or web address]

## 2024-01-17 NOTE — DISCHARGE NOTE NURSING/CASE MANAGEMENT/SOCIAL WORK - PATIENT PORTAL LINK FT
You can access the FollowMyHealth Patient Portal offered by [facility] by registering at the following website: [email or web address]. By joining [facility]’s FollowMyHealth portal, you will also be able to view your health information using other applications (apps) compatible with our system.

## 2024-01-17 NOTE — DISCHARGE NOTE PROVIDER - HOSPITAL COURSE
63 y.o. female  with a pmhx of asthma, anemia of CKD, rt kidney lymphoma, s/p failed right renal transplant, ESRD (HD MWF), HLD, MDD, HTN, presents today for elective cardiac catheterization. Now s/p LHC with  mRCA 90%=> stent x2, noted with prolonged WA interval and her Metoprolol was decreased to 25 mg daily. Imdur discontinued. Renal following for HD

## 2024-01-17 NOTE — CHART NOTE - NSCHARTNOTEFT_GEN_A_CORE
Per Cardiology continue home aspirin and brilinta Per Cardiology continue home aspirin and brilinta. Pt already on Brilinta prior to admission.

## 2024-01-17 NOTE — DISCHARGE NOTE PROVIDER - NSDCADMDATE_GEN_ALL_CORE_FT
2 liter of oxygen was placed on the patient earlier in the shift and it was explained to her the purpose of wearing oxygen.  At this time pt O2 sat began to drop again and again it was explained the importance of wearing the oxygen. Pt stated, I would rather wear this around my neck instead of placing oxygen in my nose. I haven't worn it before and I have been told to wear it,. I will not wear it now and would rather die than have that thing wrapped around my neck. I'm not going to wear it   16-Jan-2024 11:55

## 2024-01-17 NOTE — DISCHARGE NOTE PROVIDER - NSDCMRMEDTOKEN_GEN_ALL_CORE_FT
amLODIPine 10 mg oral tablet: 1 orally once a day  aspirin 81 mg oral tablet: orally once a day  atorvastatin 80 mg oral tablet: 1 tab(s) orally once a day (at bedtime)  calcium acetate 667 mg oral tablet: 3 tab(s) orally 3 times a day (with meals)  docusate sodium 100 mg oral tablet: 1 tab(s) orally 3 times a day  escitalopram 10 mg oral tablet: 1 tab(s) orally once a day  folic acid 0.4 mg oral tablet: 2.5 tab(s) orally once a day  hydrALAZINE 100 mg oral tablet: 1 tab(s) orally once a day  losartan 100 mg oral tablet: 1 orally once a day  metoprolol succinate 25 mg oral tablet, extended release: 1 tab(s) orally once a day  OXcarbazepine 150 mg oral tablet: 1 tab(s) orally once a day (in the evening) take at 8pm  OXcarbazepine 300 mg oral tablet: 1 tab(s) orally once a day (in the morning) take at 8am  Protonix 40 mg oral delayed release tablet: 1 tab(s) orally 2 times a day  Renovite 1 tab/day:   ticagrelor 90 mg oral tablet: 1 tab(s) orally 2 times a day  vitamin B12:   vitamin D3 5,000 units daily:

## 2024-01-17 NOTE — PROGRESS NOTE ADULT - SUBJECTIVE AND OBJECTIVE BOX
Overnight events noted  tolerated dialysis well today     VITAL:  T(C): , Max: 37.2 (01-16-24 @ 18:07)  T(F): , Max: 99 (01-16-24 @ 18:07)  HR: 72 (01-17-24 @ 11:55)  BP: 128/54 (01-17-24 @ 11:55)  BP(mean): --  RR: 17 (01-17-24 @ 11:55)  SpO2: 98% (01-17-24 @ 11:55)  Wt(kg): --      PHYSICAL EXAM:  General: Alerted, oriented  HEENT: MMM  Lungs:CTA-b/l  Heart: RRR S1S2  Abdomen: Soft, NTND  Ext: no pedal edema b/l  : no aj  access : avf     LABS:                        10.0   5.81  )-----------( 223      ( 17 Jan 2024 06:48 )             33.0     Na(137)/K(3.5)/Cl(97)/HCO3(30)/BUN(20)/Cr(2.93)Glu(142)/Ca(8.9)/Mg(--)/PO4(--)    01-17 @ 11:05  Na(129)/K(6.3)/Cl(95)/HCO3(18)/BUN(78)/Cr(7.18)Glu(94)/Ca(8.5)/Mg(2.50)/PO4(5.5)    01-17 @ 06:48  Na(137)/K(5.3)/Cl(97)/HCO3(23)/BUN(47)/Cr(5.36)Glu(102)/Ca(8.4)/Mg(--)/PO4(--)    01-16 @ 09:00    Urinalysis Basic - ( 17 Jan 2024 11:05 )    Color: x / Appearance: x / SG: x / pH: x  Gluc: 142 mg/dL / Ketone: x  / Bili: x / Urobili: x   Blood: x / Protein: x / Nitrite: x   Leuk Esterase: x / RBC: x / WBC x   Sq Epi: x / Non Sq Epi: x / Bacteria: x          ASSESSMENT:  he has PMH of hypertension, polycystic kidney disease , failed transplant 1999, ESRD on dialysis MWF ,came with chest pain now, sp cardiac cath ,nephrology consulted for resumption of dialysis      ESRD - MWF for 3h , numbing cream b/f HD  at [facility] under Dr Barber care   hypertension --stable   volume--acceptable    CAD --SP cath RCA stent   RECOMMEND:  - dialysis today with UD 1.5 -2 liters   -aranesp 40 mcg with dialysis weekly   -renal diet  - Upon dc will resume dialysis at [facility] .        Maggy Cedillo  [facility]  Office: [phone]          
Patient seen and examined at bedside, found NAD.  Pt denies active CP, SOB, palpitations, diaphoresis, orthopnea, PND, dizziness, syncope, abdominal pain/discomfort, LE swelling or any other complaints at this time    No acute events overnight    REVIEW OF SYSTEMS:  Constitutional:     [ ] negative [ ] fevers [ ] chills [ ] weight loss [ ] weight gain  HEENT:                  [ ] negative [ ] dry eyes [ ] eye irritation [ ] postnasal drip [ ] nasal congestion  CV:                         [ ] negative  [ ] chest pain [ ] orthopnea [ ] palpitations [ ] murmur  Resp:                     [ ] negative [ ] cough [ ] shortness of breath [ ] dyspnea [ ] wheezing [ ] sputum [ ]hemoptysis  GI:                          [ ] negative [ ] nausea [ ] vomiting [ ] diarrhea [ ] constipation [ ] abd pain [ ] dysphagia   :                        [ ] negative [ ] dysuria [ ] nocturia [ ] hematuria [ ] increased urinary frequency  Musculoskeletal: [ ] negative [ ] back pain [ ] myalgias [ ] arthralgias [ ] fracture  Skin:                       [ ] negative [ ] rash [ ] itch  Neurological:        [ ] negative [ ] headache [ ] dizziness [ ] syncope [ ] weakness [ ] numbness  Psychiatric:           [ ] negative [ ] anxiety [ ] depression  Endocrine:            [ ] negative [ ] diabetes [ ] thyroid problem  Heme/Lymph:      [ ] negative [ ] anemia [ ] bleeding problem  Allergic/Immune: [ ] negative [ ] itchy eyes [ ] nasal discharge [ ] hives [ ] angioedema    [x] All other systems negative  [ ] Unable to assess ROS due to    Current Meds:  amLODIPine   Tablet 10 milliGRAM(s) Oral daily  aspirin enteric coated 81 milliGRAM(s) Oral daily  atorvastatin 80 milliGRAM(s) Oral at bedtime  calcium acetate 2001 milliGRAM(s) Oral three times a day with meals  chlorhexidine 2% Cloths 1 Application(s) Topical daily  escitalopram 10 milliGRAM(s) Oral daily  hydrALAZINE 100 milliGRAM(s) Oral daily  influenza   Vaccine 0.5 milliLiter(s) IntraMuscular once  losartan 100 milliGRAM(s) Oral daily  metoprolol succinate ER 25 milliGRAM(s) Oral daily  Nephro-nehemiah 1 Tablet(s) Oral daily  OXcarbazepine 150 milliGRAM(s) Oral <User Schedule>  OXcarbazepine 300 milliGRAM(s) Oral <User Schedule>  pantoprazole    Tablet 40 milliGRAM(s) Oral before breakfast  sodium chloride 0.9% lock flush 3 milliLiter(s) IV Push every 8 hours  ticagrelor 90 milliGRAM(s) Oral every 12 hours    PAST MEDICAL & SURGICAL HISTORY:  ESRD (end stage renal disease)  dialysis M, W, F      Dyslipidemia      HTN (hypertension)      CVA (cerebrovascular accident)      Depression, major      Asthma      Anemia      AV fistula  R arm - for dialysis      Hx of hysterectomy      History of       History of cataract surgery  both eyes      Failed kidney transplant  Right side - secondary to lymphoma in the kidney      History of myomectomy      Status post exploratory laparotomy      No significant past surgical history          Vitals:  T(F): 98.1 (), Max: 99 ()  HR: 72 () (65 - 78)  BP: 128/54 () (128/54 - 160/68)  RR: 17 ()  SpO2: 98% ()  I&O's Summary    2024 07:01  -  2024 15:45  --------------------------------------------------------  IN: 400 mL / OUT: 2200 mL / NET: -1800 mL        Physical Exam:  Appearance: No acute distress; well appearing  Cardiovascular: RRR, S1, S2, no murmurs, rubs, or gallops; no edema; no JVD  Respiratory: Clear to auscultation bilaterally  Gastrointestinal: soft, non-tender, non-distended with normal bowel sounds  Musculoskeletal: No clubbing; no joint deformity   Neurologic: Non-focal  Lymphatic: No lymphadenopathy  Psychiatry: AAOx3, mood & affect appropriate  Skin: No rashes, ecchymoses, or cyanosis    **RCFA access site stable w/o hematoma or bleed, pulses intact***                          10.0   5.81  )-----------( 223      ( 2024 06:48 )             33.0         137  |  97<L>  |  20  ----------------------------<  142<H>  3.5   |  30  |  2.93<H>    Ca    8.9      2024 11:05  Phos  5.5       Mg     2.50         TPro  6.2  /  Alb  3.5  /  TBili  0.2  /  DBili  x   /  AST  24  /  ALT  11  /  AlkPhos  157<H>      New ECG(s): Personally reviewed

## 2024-01-17 NOTE — DISCHARGE NOTE PROVIDER - NSDCCPCAREPLAN_GEN_ALL_CORE_FT
PRINCIPAL DISCHARGE DIAGNOSIS  Diagnosis: CAD (coronary artery disease)  Assessment and Plan of Treatment: you had cardiac cath this admission with 2 stents placed. 1/16 Cardiac cath - mRCA 90%=> stent x2. Continue aspirin and brlinta and followup with Dr Godwin in 2 weeks   No heavy lifting x one week. No strenuous activity x 3 weeks. Monitor site of procedure and notify your doctor for any redness, swelling, discharge. No driving x 24 hours. You may shower but no baths or swimming x one week.      SECONDARY DISCHARGE DIAGNOSES  Diagnosis: ESRD on dialysis  Assessment and Plan of Treatment: Please follow up with your nephrologist for management, and continue your schedule hemodialysis.    Diagnosis: HTN (hypertension)  Assessment and Plan of Treatment: your metorpolol dose was decreased per cardiology. hold imdur. Low sodium and fat diet, continue anti-hypertensive medications, and follow up with primary care physician.

## 2024-01-17 NOTE — DISCHARGE NOTE NURSING/CASE MANAGEMENT/SOCIAL WORK - NSDCPEFALRISK_GEN_ALL_CORE
For information on Fall & Injury Prevention, visit: [email or web address] OR  [email or web address] OR  https://www.cdc.gov/steadi/patient.html

## 2024-01-17 NOTE — DISCHARGE NOTE PROVIDER - CARE PROVIDER_API CALL
Tereso Kirk  Cardiovascular Disease  [address] [place] [address], [address]  [place] [address]  Phone: [phone]  Fax: [phone]  Follow Up Time:

## 2024-01-17 NOTE — PROGRESS NOTE ADULT - ASSESSMENT
63 y.o. female  with a pmhx of asthma, anemia of CKD, rt kidney lymphoma, s/p failed right renal transplant, ESRD (HD MWF), HLD, MDD, HTN, presents today for elective cardiac catheterization. Now s/p [facility] with  mRCA 90%=> stent x2, noted with prolonged NM interval, now s/p [facility] 1/16/24 with YOLY x 2 mRCA    #CAD  --CP free, HDS, VSS  --s/p [facility] 1/16/24 with YOLY x 2 mRCA 90%, RCFA accessed, stable, pulses intact, no hematoma or bleed  --c/w Brilinta 90mg BID (will need price checked at [facility])  --c/w ASA 81mg QD, Losartan 100mg QD, Hydralazine 100mg QD, Amlodipine 10mg QD  --Pt noted with EKG with prolonged NM interval, Metoprolol decreased to 25 mg daily  --Imdur d/c'd  --Pt is to follow up with outpatient cardiologist in 1-2 weeks for post discharge check-up    #ESRD, on HD MWF  -Plans per Renal

## 2024-01-17 NOTE — CHART NOTE - NSCHARTNOTEFT_GEN_A_CORE
Post Cath Follow Up Note:    ID: 64 yo F with PMH of HTN PCKD ESRD who presented for elective cath due to CP during HD, now s/p PCI x2 to RCA with Dr. Oh (1/16)    Patient seen in HD unit notes that she tolerated HD well with no CP or SOB. RFA access site checked c/d/i with no hematoma or bruits. Patient feeling well and eager to go home.    PE:  General: WA NAD,  Pulm: CTAB, no inc WOB  Cards: RRR, S1 and S2, holosystolic flow murmur III/VI, no g/r  Abd: Soft NT/ND, BSPx4  MSK: RFA c/d/i no hematoma, no LE edema, Cyst on R wrist  Neuro: CN II-XII grossly intact, no FND  Psych: Calm and cooperative    LHC:  Conclusions:   Severe atherosclerosis in mid RCA. Mild-moderate disease in mid LAD  and distal Cx.  Successful PCI to mid RCA with YOLY x2 for treatment of CAD/angina.     Recommendations:   Dual antiplatelet therapy for 6 months. Aggressive risk factor  modification.    Coronary Angiography   The coronary circulation is right dominant. Cardiac catheterization  was performed electively.    LM   Left main artery: Normal.    LAD Mid left anterior descending: The segment is mildly calcified. There is a 40 % stenosis in the proximal third portion of the segment.    First diagonal: There is a 20 % stenosis in the ostium portion of the segment.  CX Distal circumflex: The segment is small. There is a 50 % stenosis inthe middle third portion of the segment. LIANA Flow 3.    RCA   Mid right coronary artery: There is an 80 % stenosis in the middle third portion of the segment. LIANA Flow 3.  Mid right coronary artery: There is a 60 % stenosis in the distal third portion of the segment. LIANA Flow 3.  Right Posterolateral Segment: There is a 50 % stenosis in the proximal third portion of the segment. LIANA Flow 3.      Plan:  #CAD s/p PCI  - Cont ASA 81 and Plavix 70 QDay x 6 mths  - High intensity statin  - F/U with Dr. Kikr in 1-2 weeks  - No further cardiac work up or procedures pending, stable for DC from IC standpoint    Curt Wilks PGY5  Cardiology     KENISHA Oh

## 2024-01-18 ENCOUNTER — APPOINTMENT (OUTPATIENT)
Dept: NEUROLOGY | Facility: CLINIC | Age: 64
End: 2024-01-18
Payer: MEDICARE

## 2024-01-18 VITALS
DIASTOLIC BLOOD PRESSURE: 59 MMHG | HEIGHT: 61 IN | HEART RATE: 78 BPM | BODY MASS INDEX: 17.94 KG/M2 | SYSTOLIC BLOOD PRESSURE: 111 MMHG | WEIGHT: 95 LBS

## 2024-01-18 DIAGNOSIS — I63.9 CEREBRAL INFARCTION, UNSPECIFIED: ICD-10-CM

## 2024-01-18 PROCEDURE — 99215 OFFICE O/P EST HI 40 MIN: CPT

## 2024-01-18 NOTE — HISTORY OF PRESENT ILLNESS
[FreeTextEntry1] : 63 year old woman with ESRD on HD, renal transplant 1990, multivessel CAD s/p stenting, remote lymphoma in remission, remote pontine stroke with residual R sided weakness, and recent L PCA stroke with residual visual deficits, possible epilepsy, resenting to stroke neurology for follow up.   Patient reports her most recent L PCA stroke left her with L visual field deficit, and language dysfunction.  She reports difficulty reading quickly due to the visual deficit, but she also says she is having word finding difficulties.  She has residual R sided weakness from the pontine stroke as well.  Overall however, she is independent in caring for her ADL's.  She cooks, cleans, dresses, bathes, and feeds herself.  She walks without any assistive device (although after the initial stroke she was using a walker for about 7 days).    She denies any medical complaints at this time including chest pain, shortness of breath, dyspnea on exertion.  No recent weight loss.  She continues to receive dialysis 3x weekly.    Currently she is taking ASA and ticagrelor daily after the stent placements.  She was previously recommended for loop recorder in light of the PCA strokes (although it may have been periprocedural), but she has not had the ILD placed yet, and admits she is somewhat hesitant to do so.     On exam:  NEURO: Awake, alert, oriented to month, year, hospital, normal naming, repetition. Speaking fluently. Pupils 5mm, reactive, symmetric, R superior quadrantanopsia full EOM, no nystagmus, no facial weakness, no dysarthria, tongue midline, palate symmetric. MOTOR: normal bulk and tone, R arm with 4/5 , biceps, and deltoids, L arm 4+/5 , biceps, deltoids, R leg 4/5 hip flexion, and knee extension, L leg 4+ SENSORY: decreased sensation on the right. COORDINATION: normal FNF Gait: negative Rhomberg, normal stride, normal arm swing.

## 2024-01-18 NOTE — ASSESSMENT
[FreeTextEntry1] : 63 year old woman with recent L PCA stroke, remote pontine stroke, ESRD on HD, CAD with stents on ASA/Brilinta, and remote lymphoma, presenting to stroke neurology for follow up.  Has residual visual field deficit, and R sided arm and leg weakness, as well as speech difficulty, but independent in all ADL's.  MRS is 1 at this time.     On exam, she is diffusely weak, but R>L, has a R superior quadrantanopia, but the speech is fluent.   Imaging reviewed.   Overall, suspect large vessel athero for the pontine stroke, and possible periprocedural complication for the PCA stroke, but cannot rule out a cardioembolic source, especially since she does have CAD.    -recommended a loop recorder.  She has follow up with her cardiologist in 2 weeks, she will notify the cardiologist we recommended a loop -given the recent stents, continue with DAPT per cardiology.   -continue statin -will send for speech therapy -return to neurology for follow up with me in 6 months.

## 2024-02-19 PROBLEM — I63.9 ISCHEMIC STROKE: Status: ACTIVE | Noted: 2023-11-09

## 2024-03-12 NOTE — DISCHARGE NOTE PROVIDER - NSDCQMAMI_CARD_ALL_CORE
Pt called office stating he has not had any bowel movement since the surgery and he has been taking his stool softener and it has not been working for him and he stated he is a bit worried that he has not had any bowel movement , he states he is not in any pain but he requested to speak with RN to discus what can he do to help with the bowel movement.  
Spoke with patient who states that he had a bowel movement yesterday, the first one since surgery on 3/6/24.  Patient encouraged to continue to drink plenty of water and to take Miralax to help with bowel movements going forward.  Patient has no other concerns and states he is feeling well.    
No

## 2024-06-14 ENCOUNTER — INPATIENT (INPATIENT)
Facility: HOSPITAL | Age: 64
LOS: 2 days | Discharge: HOME | End: 2024-06-17
Attending: HOSPITALIST | Admitting: HOSPITALIST
Payer: MEDICARE

## 2024-06-14 VITALS
SYSTOLIC BLOOD PRESSURE: 173 MMHG | WEIGHT: 90.83 LBS | TEMPERATURE: 98 F | RESPIRATION RATE: 18 BRPM | DIASTOLIC BLOOD PRESSURE: 68 MMHG | OXYGEN SATURATION: 97 % | HEART RATE: 81 BPM

## 2024-06-14 DIAGNOSIS — N18.9 CHRONIC KIDNEY DISEASE, UNSPECIFIED: ICD-10-CM

## 2024-06-14 DIAGNOSIS — Z29.9 ENCOUNTER FOR PROPHYLACTIC MEASURES, UNSPECIFIED: ICD-10-CM

## 2024-06-14 DIAGNOSIS — E87.5 HYPERKALEMIA: ICD-10-CM

## 2024-06-14 DIAGNOSIS — I25.10 ATHEROSCLEROTIC HEART DISEASE OF NATIVE CORONARY ARTERY WITHOUT ANGINA PECTORIS: ICD-10-CM

## 2024-06-14 DIAGNOSIS — N18.6 END STAGE RENAL DISEASE: ICD-10-CM

## 2024-06-14 DIAGNOSIS — D64.9 ANEMIA, UNSPECIFIED: ICD-10-CM

## 2024-06-14 DIAGNOSIS — I10 ESSENTIAL (PRIMARY) HYPERTENSION: ICD-10-CM

## 2024-06-14 DIAGNOSIS — Z86.73 PERSONAL HISTORY OF TRANSIENT ISCHEMIC ATTACK (TIA), AND CEREBRAL INFARCTION WITHOUT RESIDUAL DEFICITS: ICD-10-CM

## 2024-06-14 LAB
ALBUMIN SERPL ELPH-MCNC: 4.3 G/DL — SIGNIFICANT CHANGE UP (ref 3.3–5)
ALP SERPL-CCNC: 120 U/L — SIGNIFICANT CHANGE UP (ref 40–120)
ALT FLD-CCNC: 19 U/L — SIGNIFICANT CHANGE UP (ref 4–33)
ANION GAP SERPL CALC-SCNC: 19 MMOL/L — HIGH (ref 7–14)
APTT BLD: 30.3 SEC — SIGNIFICANT CHANGE UP (ref 24.5–35.6)
AST SERPL-CCNC: 28 U/L — SIGNIFICANT CHANGE UP (ref 4–32)
BASE EXCESS BLDV CALC-SCNC: 0 MMOL/L — SIGNIFICANT CHANGE UP (ref -2–3)
BASOPHILS # BLD AUTO: 0.03 K/UL — SIGNIFICANT CHANGE UP (ref 0–0.2)
BASOPHILS NFR BLD AUTO: 0.6 % — SIGNIFICANT CHANGE UP (ref 0–2)
BILIRUB SERPL-MCNC: 0.5 MG/DL — SIGNIFICANT CHANGE UP (ref 0.2–1.2)
BLOOD GAS VENOUS COMPREHENSIVE RESULT: SIGNIFICANT CHANGE UP
BUN SERPL-MCNC: 58 MG/DL — HIGH (ref 7–23)
CALCIUM SERPL-MCNC: 9.2 MG/DL — SIGNIFICANT CHANGE UP (ref 8.4–10.5)
CHLORIDE BLDV-SCNC: 98 MMOL/L — SIGNIFICANT CHANGE UP (ref 96–108)
CHLORIDE SERPL-SCNC: 95 MMOL/L — LOW (ref 98–107)
CO2 BLDV-SCNC: 26 MMOL/L — SIGNIFICANT CHANGE UP (ref 22–26)
CO2 SERPL-SCNC: 23 MMOL/L — SIGNIFICANT CHANGE UP (ref 22–31)
CREAT SERPL-MCNC: 6.61 MG/DL — HIGH (ref 0.5–1.3)
DIALYSIS INSTRUMENT RESULT - HEPATITIS B SURFACE ANTIGEN: NEGATIVE — SIGNIFICANT CHANGE UP
EGFR: 7 ML/MIN/1.73M2 — LOW
EOSINOPHIL # BLD AUTO: 0.19 K/UL — SIGNIFICANT CHANGE UP (ref 0–0.5)
EOSINOPHIL NFR BLD AUTO: 4.1 % — SIGNIFICANT CHANGE UP (ref 0–6)
GAS PNL BLDV: 134 MMOL/L — LOW (ref 136–145)
GLUCOSE BLDV-MCNC: 92 MG/DL — SIGNIFICANT CHANGE UP (ref 70–99)
GLUCOSE SERPL-MCNC: 93 MG/DL — SIGNIFICANT CHANGE UP (ref 70–99)
HCO3 BLDV-SCNC: 25 MMOL/L — SIGNIFICANT CHANGE UP (ref 22–29)
HCT VFR BLD CALC: 20.9 % — CRITICAL LOW (ref 34.5–45)
HCT VFR BLDA CALC: 21 % — CRITICAL LOW (ref 34.5–46.5)
HGB BLD CALC-MCNC: 7.1 G/DL — LOW (ref 11.7–16.1)
HGB BLD-MCNC: 6.9 G/DL — CRITICAL LOW (ref 11.5–15.5)
IANC: 2.91 K/UL — SIGNIFICANT CHANGE UP (ref 1.8–7.4)
IMM GRANULOCYTES NFR BLD AUTO: 0.4 % — SIGNIFICANT CHANGE UP (ref 0–0.9)
INR BLD: 0.94 RATIO — SIGNIFICANT CHANGE UP (ref 0.85–1.18)
LACTATE BLDV-MCNC: 1.7 MMOL/L — SIGNIFICANT CHANGE UP (ref 0.5–2)
LYMPHOCYTES # BLD AUTO: 1.2 K/UL — SIGNIFICANT CHANGE UP (ref 1–3.3)
LYMPHOCYTES # BLD AUTO: 25.6 % — SIGNIFICANT CHANGE UP (ref 13–44)
MCHC RBC-ENTMCNC: 29.4 PG — SIGNIFICANT CHANGE UP (ref 27–34)
MCHC RBC-ENTMCNC: 31.5 GM/DL — LOW (ref 32–36)
MCV RBC AUTO: 93.5 FL — SIGNIFICANT CHANGE UP (ref 80–100)
MONOCYTES # BLD AUTO: 0.33 K/UL — SIGNIFICANT CHANGE UP (ref 0–0.9)
MONOCYTES NFR BLD AUTO: 7.1 % — SIGNIFICANT CHANGE UP (ref 2–14)
NEUTROPHILS # BLD AUTO: 2.91 K/UL — SIGNIFICANT CHANGE UP (ref 1.8–7.4)
NEUTROPHILS NFR BLD AUTO: 62.2 % — SIGNIFICANT CHANGE UP (ref 43–77)
NRBC # BLD: 0 /100 WBCS — SIGNIFICANT CHANGE UP (ref 0–0)
NRBC # FLD: 0 K/UL — SIGNIFICANT CHANGE UP (ref 0–0)
PCO2 BLDV: 40 MMHG — SIGNIFICANT CHANGE UP (ref 39–52)
PH BLDV: 7.4 — SIGNIFICANT CHANGE UP (ref 7.32–7.43)
PLATELET # BLD AUTO: 170 K/UL — SIGNIFICANT CHANGE UP (ref 150–400)
PO2 BLDV: 30 MMHG — SIGNIFICANT CHANGE UP (ref 25–45)
POTASSIUM BLDV-SCNC: 5.5 MMOL/L — HIGH (ref 3.5–5.1)
POTASSIUM SERPL-MCNC: 5.4 MMOL/L — HIGH (ref 3.5–5.3)
POTASSIUM SERPL-SCNC: 5.4 MMOL/L — HIGH (ref 3.5–5.3)
PROT SERPL-MCNC: 7 G/DL — SIGNIFICANT CHANGE UP (ref 6–8.3)
PROTHROM AB SERPL-ACNC: 10.6 SEC — SIGNIFICANT CHANGE UP (ref 9.5–13)
RBC # BLD: 2.31 M/UL — LOW (ref 3.8–5.2)
RBC # FLD: 14.9 % — HIGH (ref 10.3–14.5)
SAO2 % BLDV: 42.7 % — LOW (ref 67–88)
SODIUM SERPL-SCNC: 137 MMOL/L — SIGNIFICANT CHANGE UP (ref 135–145)
WBC # BLD: 4.68 K/UL — SIGNIFICANT CHANGE UP (ref 3.8–10.5)
WBC # FLD AUTO: 4.68 K/UL — SIGNIFICANT CHANGE UP (ref 3.8–10.5)

## 2024-06-14 PROCEDURE — 99223 1ST HOSP IP/OBS HIGH 75: CPT

## 2024-06-14 PROCEDURE — 99285 EMERGENCY DEPT VISIT HI MDM: CPT | Mod: FS

## 2024-06-14 RX ORDER — ISOSORBIDE MONONITRATE 30 MG/1
30 TABLET, EXTENDED RELEASE ORAL DAILY
Refills: 0 | Status: DISCONTINUED | OUTPATIENT
Start: 2024-06-14 | End: 2024-06-17

## 2024-06-14 RX ORDER — CALCIUM ACETATE 667 MG/1
667 CAPSULE ORAL
Refills: 0 | Status: DISCONTINUED | OUTPATIENT
Start: 2024-06-14 | End: 2024-06-17

## 2024-06-14 RX ORDER — PANTOPRAZOLE SODIUM 20 MG/1
1 TABLET, DELAYED RELEASE ORAL
Refills: 0 | DISCHARGE

## 2024-06-14 RX ORDER — TICAGRELOR 90 MG/1
1 TABLET ORAL
Refills: 0 | DISCHARGE

## 2024-06-14 RX ORDER — OXCARBAZEPINE 600 MG/1
150 TABLET, FILM COATED ORAL
Refills: 0 | Status: DISCONTINUED | OUTPATIENT
Start: 2024-06-14 | End: 2024-06-17

## 2024-06-14 RX ORDER — METOPROLOL TARTRATE 50 MG
0 TABLET ORAL
Qty: 0 | Refills: 2 | DISCHARGE

## 2024-06-14 RX ORDER — FOLIC ACID 0.8 MG
2.5 TABLET ORAL
Refills: 0 | DISCHARGE

## 2024-06-14 RX ORDER — ERYTHROPOIETIN 4000 [IU]/ML
10000 INJECTION, SOLUTION INTRAVENOUS; SUBCUTANEOUS
Refills: 0 | Status: DISCONTINUED | OUTPATIENT
Start: 2024-06-14 | End: 2024-06-17

## 2024-06-14 RX ORDER — FOLIC ACID
1 POWDER (GRAM) MISCELLANEOUS DAILY
Refills: 0 | Status: DISCONTINUED | OUTPATIENT
Start: 2024-06-14 | End: 2024-06-17

## 2024-06-14 RX ORDER — LOSARTAN POTASSIUM 100 MG/1
1 TABLET, FILM COATED ORAL
Refills: 0 | DISCHARGE

## 2024-06-14 RX ORDER — PANTOPRAZOLE SODIUM 40 MG/10ML
40 INJECTION, POWDER, FOR SOLUTION INTRAVENOUS
Refills: 0 | Status: DISCONTINUED | OUTPATIENT
Start: 2024-06-14 | End: 2024-06-17

## 2024-06-14 RX ORDER — DOCUSATE SODIUM 100 MG
1 CAPSULE ORAL
Refills: 0 | DISCHARGE

## 2024-06-14 RX ORDER — OXCARBAZEPINE 600 MG/1
300 TABLET, FILM COATED ORAL
Refills: 0 | Status: DISCONTINUED | OUTPATIENT
Start: 2024-06-14 | End: 2024-06-17

## 2024-06-14 RX ORDER — ASPIRIN/CALCIUM CARB/MAGNESIUM 324 MG
0 TABLET ORAL
Refills: 0 | DISCHARGE

## 2024-06-14 RX ORDER — ATORVASTATIN CALCIUM 20 MG/1
80 TABLET, FILM COATED ORAL AT BEDTIME
Refills: 0 | Status: DISCONTINUED | OUTPATIENT
Start: 2024-06-14 | End: 2024-06-17

## 2024-06-14 RX ORDER — CALCIUM ACETATE 667 MG
3 TABLET ORAL
Refills: 0 | DISCHARGE

## 2024-06-14 RX ORDER — HYDRALAZINE HYDROCHLORIDE 50 MG/1
100 TABLET ORAL EVERY 8 HOURS
Refills: 0 | Status: DISCONTINUED | OUTPATIENT
Start: 2024-06-14 | End: 2024-06-17

## 2024-06-14 RX ORDER — PREGABALIN 225 MG/1
0 CAPSULE ORAL
Refills: 0 | DISCHARGE

## 2024-06-14 RX ORDER — AMLODIPINE BESYLATE 2.5 MG/1
10 TABLET ORAL DAILY
Refills: 0 | Status: DISCONTINUED | OUTPATIENT
Start: 2024-06-14 | End: 2024-06-17

## 2024-06-14 RX ORDER — ACETAMINOPHEN 325 MG
650 TABLET ORAL EVERY 6 HOURS
Refills: 0 | Status: DISCONTINUED | OUTPATIENT
Start: 2024-06-14 | End: 2024-06-17

## 2024-06-14 RX ORDER — AMLODIPINE BESYLATE 2.5 MG/1
1 TABLET ORAL
Refills: 0 | DISCHARGE

## 2024-06-14 RX ORDER — ISOSORBIDE MONONITRATE 30 MG/1
1 TABLET, EXTENDED RELEASE ORAL
Refills: 0 | DISCHARGE

## 2024-06-14 RX ORDER — METOPROLOL TARTRATE 50 MG
25 TABLET ORAL DAILY
Refills: 0 | Status: DISCONTINUED | OUTPATIENT
Start: 2024-06-14 | End: 2024-06-17

## 2024-06-14 RX ORDER — LOSARTAN POTASSIUM 100 MG/1
100 TABLET, FILM COATED ORAL DAILY
Refills: 0 | Status: DISCONTINUED | OUTPATIENT
Start: 2024-06-14 | End: 2024-06-17

## 2024-06-14 RX ORDER — ESCITALOPRAM OXALATE 20 MG/1
10 TABLET, FILM COATED ORAL DAILY
Refills: 0 | Status: DISCONTINUED | OUTPATIENT
Start: 2024-06-14 | End: 2024-06-17

## 2024-06-14 RX ADMIN — ATORVASTATIN CALCIUM 80 MILLIGRAM(S): 20 TABLET, FILM COATED ORAL at 21:54

## 2024-06-14 RX ADMIN — HYDRALAZINE HYDROCHLORIDE 100 MILLIGRAM(S): 50 TABLET ORAL at 21:54

## 2024-06-14 RX ADMIN — ERYTHROPOIETIN 10000 UNIT(S): 4000 INJECTION, SOLUTION INTRAVENOUS; SUBCUTANEOUS at 19:30

## 2024-06-14 RX ADMIN — OXCARBAZEPINE 150 MILLIGRAM(S): 600 TABLET, FILM COATED ORAL at 23:48

## 2024-06-15 PROBLEM — D64.9 ANEMIA, UNSPECIFIED: Chronic | Status: ACTIVE | Noted: 2024-01-16

## 2024-06-15 PROBLEM — J45.909 UNSPECIFIED ASTHMA, UNCOMPLICATED: Chronic | Status: ACTIVE | Noted: 2024-01-16

## 2024-06-15 LAB
A1C WITH ESTIMATED AVERAGE GLUCOSE RESULT: 5.2 % — SIGNIFICANT CHANGE UP (ref 4–5.6)
ALBUMIN SERPL ELPH-MCNC: 4 G/DL — SIGNIFICANT CHANGE UP (ref 3.3–5)
ALP SERPL-CCNC: 117 U/L — SIGNIFICANT CHANGE UP (ref 40–120)
ALT FLD-CCNC: 15 U/L — SIGNIFICANT CHANGE UP (ref 4–33)
ANION GAP SERPL CALC-SCNC: 13 MMOL/L — SIGNIFICANT CHANGE UP (ref 7–14)
AST SERPL-CCNC: 22 U/L — SIGNIFICANT CHANGE UP (ref 4–32)
BILIRUB SERPL-MCNC: 0.9 MG/DL — SIGNIFICANT CHANGE UP (ref 0.2–1.2)
BUN SERPL-MCNC: 30 MG/DL — HIGH (ref 7–23)
CALCIUM SERPL-MCNC: 9.4 MG/DL — SIGNIFICANT CHANGE UP (ref 8.4–10.5)
CHLORIDE SERPL-SCNC: 96 MMOL/L — LOW (ref 98–107)
CHOLEST SERPL-MCNC: 162 MG/DL — SIGNIFICANT CHANGE UP
CO2 SERPL-SCNC: 29 MMOL/L — SIGNIFICANT CHANGE UP (ref 22–31)
CREAT SERPL-MCNC: 4.13 MG/DL — HIGH (ref 0.5–1.3)
EGFR: 12 ML/MIN/1.73M2 — LOW
ESTIMATED AVERAGE GLUCOSE: 103 — SIGNIFICANT CHANGE UP
GLUCOSE SERPL-MCNC: 97 MG/DL — SIGNIFICANT CHANGE UP (ref 70–99)
HBV CORE AB SER-ACNC: SIGNIFICANT CHANGE UP
HBV CORE IGM SER-ACNC: SIGNIFICANT CHANGE UP
HBV SURFACE AB SER-ACNC: 221.2 MIU/ML — SIGNIFICANT CHANGE UP
HBV SURFACE AG SER-ACNC: SIGNIFICANT CHANGE UP
HCT VFR BLD CALC: 25.2 % — LOW (ref 34.5–45)
HCV AB S/CO SERPL IA: 0.06 S/CO — SIGNIFICANT CHANGE UP (ref 0–0.99)
HCV AB SERPL-IMP: SIGNIFICANT CHANGE UP
HDLC SERPL-MCNC: 53 MG/DL — SIGNIFICANT CHANGE UP
HGB BLD-MCNC: 8.3 G/DL — LOW (ref 11.5–15.5)
LIPID PNL WITH DIRECT LDL SERPL: 89 MG/DL — SIGNIFICANT CHANGE UP
MCHC RBC-ENTMCNC: 30.1 PG — SIGNIFICANT CHANGE UP (ref 27–34)
MCHC RBC-ENTMCNC: 32.9 GM/DL — SIGNIFICANT CHANGE UP (ref 32–36)
MCV RBC AUTO: 91.3 FL — SIGNIFICANT CHANGE UP (ref 80–100)
MRSA PCR RESULT.: SIGNIFICANT CHANGE UP
NON HDL CHOLESTEROL: 109 MG/DL — SIGNIFICANT CHANGE UP
NRBC # BLD: 0 /100 WBCS — SIGNIFICANT CHANGE UP (ref 0–0)
NRBC # FLD: 0 K/UL — SIGNIFICANT CHANGE UP (ref 0–0)
PLATELET # BLD AUTO: 162 K/UL — SIGNIFICANT CHANGE UP (ref 150–400)
POTASSIUM SERPL-MCNC: 5 MMOL/L — SIGNIFICANT CHANGE UP (ref 3.5–5.3)
POTASSIUM SERPL-SCNC: 5 MMOL/L — SIGNIFICANT CHANGE UP (ref 3.5–5.3)
PROT SERPL-MCNC: 6.4 G/DL — SIGNIFICANT CHANGE UP (ref 6–8.3)
RBC # BLD: 2.76 M/UL — LOW (ref 3.8–5.2)
RBC # FLD: 15.7 % — HIGH (ref 10.3–14.5)
S AUREUS DNA NOSE QL NAA+PROBE: DETECTED
SODIUM SERPL-SCNC: 138 MMOL/L — SIGNIFICANT CHANGE UP (ref 135–145)
TRIGL SERPL-MCNC: 102 MG/DL — SIGNIFICANT CHANGE UP
WBC # BLD: 5.06 K/UL — SIGNIFICANT CHANGE UP (ref 3.8–10.5)
WBC # FLD AUTO: 5.06 K/UL — SIGNIFICANT CHANGE UP (ref 3.8–10.5)

## 2024-06-15 PROCEDURE — 99232 SBSQ HOSP IP/OBS MODERATE 35: CPT

## 2024-06-15 RX ORDER — MUPIROCIN 20 MG/G
1 OINTMENT TOPICAL EVERY 12 HOURS
Refills: 0 | Status: DISCONTINUED | OUTPATIENT
Start: 2024-06-15 | End: 2024-06-17

## 2024-06-15 RX ORDER — MUPIROCIN 20 MG/G
1 OINTMENT TOPICAL
Refills: 0 | Status: DISCONTINUED | OUTPATIENT
Start: 2024-06-15 | End: 2024-06-15

## 2024-06-15 RX ORDER — ASPIRIN 325 MG/1
81 TABLET, FILM COATED ORAL DAILY
Refills: 0 | Status: DISCONTINUED | OUTPATIENT
Start: 2024-06-15 | End: 2024-06-17

## 2024-06-15 RX ORDER — TICAGRELOR 90 MG/1
90 TABLET ORAL EVERY 12 HOURS
Refills: 0 | Status: DISCONTINUED | OUTPATIENT
Start: 2024-06-15 | End: 2024-06-17

## 2024-06-15 RX ORDER — MECLIZINE HCL 25 MG
12.5 TABLET ORAL EVERY 8 HOURS
Refills: 0 | Status: DISCONTINUED | OUTPATIENT
Start: 2024-06-15 | End: 2024-06-17

## 2024-06-15 RX ADMIN — LOSARTAN POTASSIUM 100 MILLIGRAM(S): 100 TABLET, FILM COATED ORAL at 06:51

## 2024-06-15 RX ADMIN — CALCIUM ACETATE 667 MILLIGRAM(S): 667 CAPSULE ORAL at 08:24

## 2024-06-15 RX ADMIN — TICAGRELOR 90 MILLIGRAM(S): 90 TABLET ORAL at 18:12

## 2024-06-15 RX ADMIN — PANTOPRAZOLE SODIUM 40 MILLIGRAM(S): 40 INJECTION, POWDER, FOR SOLUTION INTRAVENOUS at 06:51

## 2024-06-15 RX ADMIN — ISOSORBIDE MONONITRATE 30 MILLIGRAM(S): 30 TABLET, EXTENDED RELEASE ORAL at 11:53

## 2024-06-15 RX ADMIN — HYDRALAZINE HYDROCHLORIDE 100 MILLIGRAM(S): 50 TABLET ORAL at 06:51

## 2024-06-15 RX ADMIN — CALCIUM ACETATE 667 MILLIGRAM(S): 667 CAPSULE ORAL at 18:11

## 2024-06-15 RX ADMIN — Medication 1 MILLIGRAM(S): at 11:53

## 2024-06-15 RX ADMIN — ESCITALOPRAM OXALATE 10 MILLIGRAM(S): 20 TABLET, FILM COATED ORAL at 11:53

## 2024-06-15 RX ADMIN — Medication 25 MILLIGRAM(S): at 06:51

## 2024-06-15 RX ADMIN — Medication 1 APPLICATION(S): at 11:55

## 2024-06-15 RX ADMIN — OXCARBAZEPINE 300 MILLIGRAM(S): 600 TABLET, FILM COATED ORAL at 08:23

## 2024-06-15 RX ADMIN — AMLODIPINE BESYLATE 10 MILLIGRAM(S): 2.5 TABLET ORAL at 06:51

## 2024-06-15 RX ADMIN — CALCIUM ACETATE 667 MILLIGRAM(S): 667 CAPSULE ORAL at 11:53

## 2024-06-15 RX ADMIN — Medication 12.5 MILLIGRAM(S): at 13:26

## 2024-06-16 ENCOUNTER — TRANSCRIPTION ENCOUNTER (OUTPATIENT)
Age: 64
End: 2024-06-16

## 2024-06-16 LAB
ANION GAP SERPL CALC-SCNC: 14 MMOL/L — SIGNIFICANT CHANGE UP (ref 7–14)
BASOPHILS # BLD AUTO: 0.02 K/UL — SIGNIFICANT CHANGE UP (ref 0–0.2)
BASOPHILS NFR BLD AUTO: 0.5 % — SIGNIFICANT CHANGE UP (ref 0–2)
BUN SERPL-MCNC: 61 MG/DL — HIGH (ref 7–23)
CALCIUM SERPL-MCNC: 9.3 MG/DL — SIGNIFICANT CHANGE UP (ref 8.4–10.5)
CHLORIDE SERPL-SCNC: 92 MMOL/L — LOW (ref 98–107)
CO2 SERPL-SCNC: 26 MMOL/L — SIGNIFICANT CHANGE UP (ref 22–31)
CREAT SERPL-MCNC: 6.36 MG/DL — HIGH (ref 0.5–1.3)
EGFR: 7 ML/MIN/1.73M2 — LOW
EOSINOPHIL # BLD AUTO: 0.28 K/UL — SIGNIFICANT CHANGE UP (ref 0–0.5)
EOSINOPHIL NFR BLD AUTO: 6.4 % — HIGH (ref 0–6)
GLUCOSE SERPL-MCNC: 93 MG/DL — SIGNIFICANT CHANGE UP (ref 70–99)
HCT VFR BLD CALC: 23.3 % — LOW (ref 34.5–45)
HGB BLD-MCNC: 7.8 G/DL — LOW (ref 11.5–15.5)
IANC: 2.43 K/UL — SIGNIFICANT CHANGE UP (ref 1.8–7.4)
IMM GRANULOCYTES NFR BLD AUTO: 0.5 % — SIGNIFICANT CHANGE UP (ref 0–0.9)
LYMPHOCYTES # BLD AUTO: 1.11 K/UL — SIGNIFICANT CHANGE UP (ref 1–3.3)
LYMPHOCYTES # BLD AUTO: 25.5 % — SIGNIFICANT CHANGE UP (ref 13–44)
MAGNESIUM SERPL-MCNC: 2.3 MG/DL — SIGNIFICANT CHANGE UP (ref 1.6–2.6)
MCHC RBC-ENTMCNC: 30.6 PG — SIGNIFICANT CHANGE UP (ref 27–34)
MCHC RBC-ENTMCNC: 33.5 GM/DL — SIGNIFICANT CHANGE UP (ref 32–36)
MCV RBC AUTO: 91.4 FL — SIGNIFICANT CHANGE UP (ref 80–100)
MONOCYTES # BLD AUTO: 0.49 K/UL — SIGNIFICANT CHANGE UP (ref 0–0.9)
MONOCYTES NFR BLD AUTO: 11.3 % — SIGNIFICANT CHANGE UP (ref 2–14)
NEUTROPHILS # BLD AUTO: 2.43 K/UL — SIGNIFICANT CHANGE UP (ref 1.8–7.4)
NEUTROPHILS NFR BLD AUTO: 55.8 % — SIGNIFICANT CHANGE UP (ref 43–77)
NRBC # BLD: 0 /100 WBCS — SIGNIFICANT CHANGE UP (ref 0–0)
NRBC # FLD: 0 K/UL — SIGNIFICANT CHANGE UP (ref 0–0)
PHOSPHATE SERPL-MCNC: 3.7 MG/DL — SIGNIFICANT CHANGE UP (ref 2.5–4.5)
PLATELET # BLD AUTO: 168 K/UL — SIGNIFICANT CHANGE UP (ref 150–400)
POTASSIUM SERPL-MCNC: 5.1 MMOL/L — SIGNIFICANT CHANGE UP (ref 3.5–5.3)
POTASSIUM SERPL-SCNC: 5.1 MMOL/L — SIGNIFICANT CHANGE UP (ref 3.5–5.3)
RBC # BLD: 2.55 M/UL — LOW (ref 3.8–5.2)
RBC # FLD: 16 % — HIGH (ref 10.3–14.5)
SODIUM SERPL-SCNC: 132 MMOL/L — LOW (ref 135–145)
WBC # BLD: 4.35 K/UL — SIGNIFICANT CHANGE UP (ref 3.8–10.5)
WBC # FLD AUTO: 4.35 K/UL — SIGNIFICANT CHANGE UP (ref 3.8–10.5)

## 2024-06-16 PROCEDURE — 99232 SBSQ HOSP IP/OBS MODERATE 35: CPT

## 2024-06-16 RX ADMIN — ESCITALOPRAM OXALATE 10 MILLIGRAM(S): 20 TABLET, FILM COATED ORAL at 11:46

## 2024-06-16 RX ADMIN — LOSARTAN POTASSIUM 100 MILLIGRAM(S): 100 TABLET, FILM COATED ORAL at 06:17

## 2024-06-16 RX ADMIN — TICAGRELOR 90 MILLIGRAM(S): 90 TABLET ORAL at 06:16

## 2024-06-16 RX ADMIN — OXCARBAZEPINE 150 MILLIGRAM(S): 600 TABLET, FILM COATED ORAL at 20:37

## 2024-06-16 RX ADMIN — OXCARBAZEPINE 300 MILLIGRAM(S): 600 TABLET, FILM COATED ORAL at 08:51

## 2024-06-16 RX ADMIN — TICAGRELOR 90 MILLIGRAM(S): 90 TABLET ORAL at 17:55

## 2024-06-16 RX ADMIN — HYDRALAZINE HYDROCHLORIDE 100 MILLIGRAM(S): 50 TABLET ORAL at 06:16

## 2024-06-16 RX ADMIN — MUPIROCIN 1 APPLICATION(S): 20 OINTMENT TOPICAL at 17:55

## 2024-06-16 RX ADMIN — ASPIRIN 81 MILLIGRAM(S): 325 TABLET, FILM COATED ORAL at 11:46

## 2024-06-16 RX ADMIN — Medication 25 MILLIGRAM(S): at 06:16

## 2024-06-16 RX ADMIN — MUPIROCIN 1 APPLICATION(S): 20 OINTMENT TOPICAL at 06:17

## 2024-06-16 RX ADMIN — PANTOPRAZOLE SODIUM 40 MILLIGRAM(S): 40 INJECTION, POWDER, FOR SOLUTION INTRAVENOUS at 06:16

## 2024-06-16 RX ADMIN — Medication 1 APPLICATION(S): at 12:37

## 2024-06-16 RX ADMIN — ATORVASTATIN CALCIUM 80 MILLIGRAM(S): 20 TABLET, FILM COATED ORAL at 21:55

## 2024-06-16 RX ADMIN — AMLODIPINE BESYLATE 10 MILLIGRAM(S): 2.5 TABLET ORAL at 06:16

## 2024-06-16 RX ADMIN — CALCIUM ACETATE 667 MILLIGRAM(S): 667 CAPSULE ORAL at 17:56

## 2024-06-16 RX ADMIN — Medication 1 MILLIGRAM(S): at 11:46

## 2024-06-16 RX ADMIN — CALCIUM ACETATE 667 MILLIGRAM(S): 667 CAPSULE ORAL at 08:51

## 2024-06-16 RX ADMIN — HYDRALAZINE HYDROCHLORIDE 100 MILLIGRAM(S): 50 TABLET ORAL at 15:43

## 2024-06-16 RX ADMIN — HYDRALAZINE HYDROCHLORIDE 100 MILLIGRAM(S): 50 TABLET ORAL at 21:55

## 2024-06-16 RX ADMIN — ISOSORBIDE MONONITRATE 30 MILLIGRAM(S): 30 TABLET, EXTENDED RELEASE ORAL at 15:43

## 2024-06-16 RX ADMIN — CALCIUM ACETATE 667 MILLIGRAM(S): 667 CAPSULE ORAL at 12:39

## 2024-06-17 ENCOUNTER — TRANSCRIPTION ENCOUNTER (OUTPATIENT)
Age: 64
End: 2024-06-17

## 2024-06-17 VITALS
OXYGEN SATURATION: 99 % | TEMPERATURE: 98 F | HEART RATE: 64 BPM | DIASTOLIC BLOOD PRESSURE: 50 MMHG | RESPIRATION RATE: 18 BRPM | SYSTOLIC BLOOD PRESSURE: 113 MMHG

## 2024-06-17 PROCEDURE — 99239 HOSP IP/OBS DSCHRG MGMT >30: CPT

## 2024-06-17 RX ORDER — DARBEPOETIN ALFA 40 UG/ML
60 SOLUTION INTRAVENOUS; SUBCUTANEOUS
Refills: 0 | Status: DISCONTINUED | OUTPATIENT
Start: 2024-06-17 | End: 2024-06-17

## 2024-06-17 RX ADMIN — Medication 1 MILLIGRAM(S): at 12:36

## 2024-06-17 RX ADMIN — ERYTHROPOIETIN 10000 UNIT(S): 4000 INJECTION, SOLUTION INTRAVENOUS; SUBCUTANEOUS at 18:51

## 2024-06-17 RX ADMIN — ISOSORBIDE MONONITRATE 30 MILLIGRAM(S): 30 TABLET, EXTENDED RELEASE ORAL at 12:36

## 2024-06-17 RX ADMIN — OXCARBAZEPINE 300 MILLIGRAM(S): 600 TABLET, FILM COATED ORAL at 09:27

## 2024-06-17 RX ADMIN — CALCIUM ACETATE 667 MILLIGRAM(S): 667 CAPSULE ORAL at 12:36

## 2024-06-17 RX ADMIN — ATORVASTATIN CALCIUM 80 MILLIGRAM(S): 20 TABLET, FILM COATED ORAL at 20:18

## 2024-06-17 RX ADMIN — ASPIRIN 81 MILLIGRAM(S): 325 TABLET, FILM COATED ORAL at 12:36

## 2024-06-17 RX ADMIN — TICAGRELOR 90 MILLIGRAM(S): 90 TABLET ORAL at 20:17

## 2024-06-17 RX ADMIN — HYDRALAZINE HYDROCHLORIDE 100 MILLIGRAM(S): 50 TABLET ORAL at 20:17

## 2024-06-17 RX ADMIN — Medication 1 APPLICATION(S): at 09:28

## 2024-06-17 RX ADMIN — AMLODIPINE BESYLATE 10 MILLIGRAM(S): 2.5 TABLET ORAL at 06:30

## 2024-06-17 RX ADMIN — HYDRALAZINE HYDROCHLORIDE 100 MILLIGRAM(S): 50 TABLET ORAL at 06:29

## 2024-06-17 RX ADMIN — LOSARTAN POTASSIUM 100 MILLIGRAM(S): 100 TABLET, FILM COATED ORAL at 06:30

## 2024-06-17 RX ADMIN — OXCARBAZEPINE 150 MILLIGRAM(S): 600 TABLET, FILM COATED ORAL at 20:23

## 2024-06-17 RX ADMIN — CALCIUM ACETATE 667 MILLIGRAM(S): 667 CAPSULE ORAL at 20:16

## 2024-06-17 RX ADMIN — MUPIROCIN 1 APPLICATION(S): 20 OINTMENT TOPICAL at 20:17

## 2024-06-17 RX ADMIN — MUPIROCIN 1 APPLICATION(S): 20 OINTMENT TOPICAL at 06:35

## 2024-06-17 RX ADMIN — PANTOPRAZOLE SODIUM 40 MILLIGRAM(S): 40 INJECTION, POWDER, FOR SOLUTION INTRAVENOUS at 06:30

## 2024-06-17 RX ADMIN — ESCITALOPRAM OXALATE 10 MILLIGRAM(S): 20 TABLET, FILM COATED ORAL at 12:36

## 2024-06-17 RX ADMIN — CALCIUM ACETATE 667 MILLIGRAM(S): 667 CAPSULE ORAL at 09:26

## 2024-06-17 RX ADMIN — TICAGRELOR 90 MILLIGRAM(S): 90 TABLET ORAL at 06:30

## 2024-06-17 RX ADMIN — Medication 25 MILLIGRAM(S): at 06:29

## 2024-10-16 ENCOUNTER — EMERGENCY (EMERGENCY)
Facility: HOSPITAL | Age: 64
LOS: 1 days | Discharge: HOME | End: 2024-10-16
Attending: EMERGENCY MEDICINE | Admitting: EMERGENCY MEDICINE
Payer: MEDICARE

## 2024-10-16 VITALS
RESPIRATION RATE: 18 BRPM | SYSTOLIC BLOOD PRESSURE: 153 MMHG | TEMPERATURE: 99 F | HEART RATE: 79 BPM | DIASTOLIC BLOOD PRESSURE: 57 MMHG | OXYGEN SATURATION: 97 %

## 2024-10-16 VITALS
OXYGEN SATURATION: 98 % | SYSTOLIC BLOOD PRESSURE: 169 MMHG | TEMPERATURE: 98 F | DIASTOLIC BLOOD PRESSURE: 56 MMHG | WEIGHT: 82.67 LBS | HEIGHT: 62.6 IN | HEART RATE: 70 BPM | RESPIRATION RATE: 18 BRPM

## 2024-10-16 LAB
ALBUMIN SERPL ELPH-MCNC: 3.8 G/DL — SIGNIFICANT CHANGE UP (ref 3.3–5)
ALP SERPL-CCNC: 187 U/L — HIGH (ref 40–120)
ALT FLD-CCNC: 14 U/L — SIGNIFICANT CHANGE UP (ref 4–33)
ANION GAP SERPL CALC-SCNC: 18 MMOL/L — HIGH (ref 7–14)
ANISOCYTOSIS BLD QL: SIGNIFICANT CHANGE UP
AST SERPL-CCNC: 24 U/L — SIGNIFICANT CHANGE UP (ref 4–32)
BASOPHILS # BLD AUTO: 0.03 K/UL — SIGNIFICANT CHANGE UP (ref 0–0.2)
BASOPHILS NFR BLD AUTO: 0.9 % — SIGNIFICANT CHANGE UP (ref 0–2)
BILIRUB SERPL-MCNC: 0.6 MG/DL — SIGNIFICANT CHANGE UP (ref 0.2–1.2)
BUN SERPL-MCNC: 13 MG/DL — SIGNIFICANT CHANGE UP (ref 7–23)
CALCIUM SERPL-MCNC: 9.7 MG/DL — SIGNIFICANT CHANGE UP (ref 8.4–10.5)
CHLORIDE SERPL-SCNC: 97 MMOL/L — LOW (ref 98–107)
CO2 SERPL-SCNC: 27 MMOL/L — SIGNIFICANT CHANGE UP (ref 22–31)
CREAT SERPL-MCNC: 2.68 MG/DL — HIGH (ref 0.5–1.3)
EGFR: 19 ML/MIN/1.73M2 — LOW
EOSINOPHIL # BLD AUTO: 0.25 K/UL — SIGNIFICANT CHANGE UP (ref 0–0.5)
EOSINOPHIL NFR BLD AUTO: 8.7 % — HIGH (ref 0–6)
GLUCOSE SERPL-MCNC: 167 MG/DL — HIGH (ref 70–99)
HCT VFR BLD CALC: 24.9 % — LOW (ref 34.5–45)
HGB BLD-MCNC: 7.3 G/DL — LOW (ref 11.5–15.5)
HYPOCHROMIA BLD QL: SLIGHT — SIGNIFICANT CHANGE UP
IANC: 1.98 K/UL — SIGNIFICANT CHANGE UP (ref 1.8–7.4)
LYMPHOCYTES # BLD AUTO: 0.38 K/UL — LOW (ref 1–3.3)
LYMPHOCYTES # BLD AUTO: 13 % — SIGNIFICANT CHANGE UP (ref 13–44)
MACROCYTES BLD QL: SLIGHT — SIGNIFICANT CHANGE UP
MAGNESIUM SERPL-MCNC: 2.2 MG/DL — SIGNIFICANT CHANGE UP (ref 1.6–2.6)
MCHC RBC-ENTMCNC: 27.4 PG — SIGNIFICANT CHANGE UP (ref 27–34)
MCHC RBC-ENTMCNC: 29.3 GM/DL — LOW (ref 32–36)
MCV RBC AUTO: 93.6 FL — SIGNIFICANT CHANGE UP (ref 80–100)
MONOCYTES # BLD AUTO: 0.1 K/UL — SIGNIFICANT CHANGE UP (ref 0–0.9)
MONOCYTES NFR BLD AUTO: 3.5 % — SIGNIFICANT CHANGE UP (ref 2–14)
NEUTROPHILS # BLD AUTO: 2.14 K/UL — SIGNIFICANT CHANGE UP (ref 1.8–7.4)
NEUTROPHILS NFR BLD AUTO: 73.9 % — SIGNIFICANT CHANGE UP (ref 43–77)
OVALOCYTES BLD QL SMEAR: SLIGHT — SIGNIFICANT CHANGE UP
PHOSPHATE SERPL-MCNC: 2.8 MG/DL — SIGNIFICANT CHANGE UP (ref 2.5–4.5)
PLAT MORPH BLD: NORMAL — SIGNIFICANT CHANGE UP
PLATELET # BLD AUTO: 236 K/UL — SIGNIFICANT CHANGE UP (ref 150–400)
PLATELET COUNT - ESTIMATE: NORMAL — SIGNIFICANT CHANGE UP
POIKILOCYTOSIS BLD QL AUTO: SIGNIFICANT CHANGE UP
POLYCHROMASIA BLD QL SMEAR: SLIGHT — SIGNIFICANT CHANGE UP
POTASSIUM SERPL-MCNC: 4 MMOL/L — SIGNIFICANT CHANGE UP (ref 3.5–5.3)
POTASSIUM SERPL-SCNC: 4 MMOL/L — SIGNIFICANT CHANGE UP (ref 3.5–5.3)
PROT SERPL-MCNC: 6.4 G/DL — SIGNIFICANT CHANGE UP (ref 6–8.3)
RBC # BLD: 2.66 M/UL — LOW (ref 3.8–5.2)
RBC # FLD: 18.6 % — HIGH (ref 10.3–14.5)
RBC BLD AUTO: ABNORMAL
SODIUM SERPL-SCNC: 142 MMOL/L — SIGNIFICANT CHANGE UP (ref 135–145)
WBC # BLD: 2.89 K/UL — LOW (ref 3.8–10.5)
WBC # FLD AUTO: 2.89 K/UL — LOW (ref 3.8–10.5)

## 2024-10-16 PROCEDURE — 93010 ELECTROCARDIOGRAM REPORT: CPT

## 2024-10-16 PROCEDURE — 99284 EMERGENCY DEPT VISIT MOD MDM: CPT

## 2024-10-16 RX ORDER — DIPHENHYDRAMINE HCL 12.5MG/5ML
25 LIQUID (ML) ORAL ONCE
Refills: 0 | Status: COMPLETED | OUTPATIENT
Start: 2024-10-16 | End: 2024-10-16

## 2024-10-16 RX ORDER — ACETAMINOPHEN 325 MG
575 TABLET ORAL ONCE
Refills: 0 | Status: COMPLETED | OUTPATIENT
Start: 2024-10-16 | End: 2024-10-16

## 2024-10-16 RX ORDER — VALACYCLOVIR 1000 MG/1
1000 TABLET ORAL ONCE
Refills: 0 | Status: COMPLETED | OUTPATIENT
Start: 2024-10-16 | End: 2024-10-16

## 2024-10-16 RX ORDER — GABAPENTIN 800 MG/1
100 TABLET, FILM COATED ORAL ONCE
Refills: 0 | Status: COMPLETED | OUTPATIENT
Start: 2024-10-16 | End: 2024-10-16

## 2024-10-16 RX ORDER — LIDOCAINE 50 MG/G
1 CREAM TOPICAL ONCE
Refills: 0 | Status: COMPLETED | OUTPATIENT
Start: 2024-10-16 | End: 2024-10-16

## 2024-10-16 RX ORDER — VALACYCLOVIR 1000 MG/1
1 TABLET ORAL
Qty: 10 | Refills: 0
Start: 2024-10-16 | End: 2024-10-25

## 2024-10-16 RX ADMIN — LIDOCAINE 1 PATCH: 50 CREAM TOPICAL at 17:00

## 2024-10-16 RX ADMIN — VALACYCLOVIR 1000 MILLIGRAM(S): 1000 TABLET ORAL at 14:32

## 2024-10-16 RX ADMIN — GABAPENTIN 100 MILLIGRAM(S): 800 TABLET, FILM COATED ORAL at 14:32

## 2024-10-16 RX ADMIN — Medication 230 MILLIGRAM(S): at 14:32

## 2024-10-16 RX ADMIN — Medication 25 MILLIGRAM(S): at 17:00

## 2024-10-16 NOTE — ED PROVIDER NOTE - NSFOLLOWUPINSTRUCTIONS_ED_ALL_ED_FT
You were evaluated in the emergency department for a rash. You likely have shingles. You were prescribed an antiviral medication that is dose adjusted based on your kidney function (valacyclovir 1g once a day for 7 to 10 days). See your primary care doctor within 2-3 days for follow up. Take Tylenol for pain. Read the attached information about shingles.    You may take 975 mg Tylenol (acetaminophen) every six hours as needed for pain.    Rash    A rash is a change in the color of the skin. A rash can also change the way your skin feels. There are many different conditions and factors that can cause a rash, most of which are not dangerous. Make sure to follow up with your primary care physician or a dermatologist as instructed by your health care provider.    SEEK IMMEDIATE MEDICAL CARE IF YOU HAVE ANY OF THE FOLLOWING SYMPTOMS: fever not getting better with tylenol, neck stiffness, blisters, a rash inside your mouth, vaginal or anal pain, or altered mental status.

## 2024-10-16 NOTE — ED PROVIDER NOTE - OBJECTIVE STATEMENT
see MDM see MDM    Attendinyo female presents with painful rash to the right side of the chest for 2 days.  rash is vesicular in nature.  no fever or chills.  no dyspnea.

## 2024-10-16 NOTE — ED ADULT NURSE NOTE - NSFALLUNIVINTERV_ED_ALL_ED
Bed/Stretcher in lowest position, wheels locked, appropriate side rails in place/Call bell, personal items and telephone in reach/Instruct patient to call for assistance before getting out of bed/chair/stretcher/Non-slip footwear applied when patient is off stretcher/[place] to call system/Physically safe environment - no spills, clutter or unnecessary equipment/Purposeful proactive rounding/Room/bathroom lighting operational, light cord in reach

## 2024-10-16 NOTE — ED ADULT NURSE NOTE - OBJECTIVE STATEMENT
Isaías RN - Pt awake and alert, Phx  ESRD s/p Kidney transplant then rejection, currently on Dialysis. M, W, F Right AV fistula, Pt endorsing a full session of dialysis prior to arrival. Pt arrives with complaints of rash to trunk area and right shoulder noticed 1 week ago. Pt has been taking Tylenol for pain with relief. No fevers in ED. But states she had fever beginning of the week. Pt well appearing. IV placed in left AC, labs sent. Medications administered as ordered. Comfort measures provided, call bell in reach. Awaiting results and disposition.

## 2024-10-16 NOTE — ED PROVIDER NOTE - NSICDXPASTMEDICALHX_GEN_ALL_CORE_FT
PAST MEDICAL HISTORY:  Anemia     Asthma     CVA (cerebrovascular accident)     Depression, major     Dyslipidemia     ESRD (end stage renal disease) dialysis M, W, F    HTN (hypertension)

## 2024-10-16 NOTE — ED PROVIDER NOTE - CLINICAL SUMMARY MEDICAL DECISION MAKING FREE TEXT BOX
Mehdi Arellano MD PGY-3:  64-year-old female with PMH ESRD (on hemodialysis right AV fistula Monday Wednesday Friday, last dialysis completed today), polycystic kidney disease status post renal transplant in the 80s, hypertension, CVA with residual hemianopsia and right-sided facial droop presents for painful, itchy, burning rash to the right side chest and right upper back since Monday.  Patient was taking Tylenol with some relief.  Patient states.  Patient states that she has had shingles in the past and has also been vaccinated against shingles and this rash feels very similar to shingles pain.  Endorsed subjective fever yesterday.  Denies nausea, vomiting, abdominal pain, constipation, diarrhea.  Patient also states that she tried this new body shampoo over a week ago however the rash only started 2 days ago. Endorses tingling and slight sob pt thinks due to the rash.       Gen: frail appearing, NAD  HEENT: no conjunctivitis  Cardiac: regular rate rhythm, normal S1S2  Chest: CTA BL, no wheeze or crackles  Abdomen: normal BS, soft, NT  Extremity: no gross deformity, good perfusion, no pitting edema  Skin: erythematous maculopapular somewhat early vesicular appearing rash tender to palpation on right chest  and upper right backT3 distribution does not cross midline  Neuro: A&Ox4    Likely early shingles rash. Completed dialysis today. Will check electrolytes given dialysis patient and tingling sensation and give analgesia. Check EKG. Dispo likely home.

## 2024-10-16 NOTE — ED PROVIDER NOTE - PATIENT PORTAL LINK FT
You can access the FollowMyHealth Patient Portal offered by [facility] by registering at the following website: [email or web address]. By joining [facility]’s FollowMyHealth portal, you will also be able to view your health information using other applications (apps) compatible with our system.

## 2024-10-16 NOTE — ED ADULT TRIAGE NOTE - CHIEF COMPLAINT QUOTE
c/o painful rash to torso and right scapular onset Monday after Dialysis, reports completed dialysis today, right forearm AVF in place, Phx fo ESRD, HTN, CVA, pt is pale in appearance for ethnicity.

## 2024-10-16 NOTE — ED PROVIDER NOTE - NSFOLLOWUPCLINICS_GEN_ALL_ED_FT
[facility] [place]  Dermatology  [address], [address]  [place] [address]  Phone: [phone]  Fax: [phone]

## 2024-10-16 NOTE — ED PROVIDER NOTE - NSICDXFAMILYHX_GEN_ALL_CORE_FT
FAMILY HISTORY:  Father  Still living? No  Family history of arrhythmia, Age at diagnosis: Age Unknown

## 2024-12-02 PROCEDURE — 93010 ELECTROCARDIOGRAM REPORT: CPT
